# Patient Record
Sex: MALE | Race: WHITE | NOT HISPANIC OR LATINO | Employment: FULL TIME | URBAN - NONMETROPOLITAN AREA
[De-identification: names, ages, dates, MRNs, and addresses within clinical notes are randomized per-mention and may not be internally consistent; named-entity substitution may affect disease eponyms.]

---

## 2017-10-17 ENCOUNTER — OFFICE VISIT (OUTPATIENT)
Dept: URGENT CARE | Facility: PHYSICIAN GROUP | Age: 42
End: 2017-10-17
Payer: COMMERCIAL

## 2017-10-17 VITALS
DIASTOLIC BLOOD PRESSURE: 88 MMHG | HEIGHT: 70 IN | SYSTOLIC BLOOD PRESSURE: 132 MMHG | RESPIRATION RATE: 14 BRPM | OXYGEN SATURATION: 95 % | WEIGHT: 247 LBS | TEMPERATURE: 97.7 F | BODY MASS INDEX: 35.36 KG/M2 | HEART RATE: 72 BPM

## 2017-10-17 DIAGNOSIS — G43.809 OTHER MIGRAINE WITHOUT STATUS MIGRAINOSUS, NOT INTRACTABLE: ICD-10-CM

## 2017-10-17 DIAGNOSIS — R19.7 NAUSEA VOMITING AND DIARRHEA: ICD-10-CM

## 2017-10-17 DIAGNOSIS — R11.2 NAUSEA VOMITING AND DIARRHEA: ICD-10-CM

## 2017-10-17 PROCEDURE — 99214 OFFICE O/P EST MOD 30 MIN: CPT | Performed by: PHYSICIAN ASSISTANT

## 2017-10-17 RX ORDER — BUTALBITAL, ACETAMINOPHEN AND CAFFEINE 300; 40; 50 MG/1; MG/1; MG/1
1 CAPSULE ORAL EVERY 12 HOURS PRN
Qty: 15 CAP | Refills: 0 | Status: SHIPPED | OUTPATIENT
Start: 2017-10-17 | End: 2017-11-17

## 2017-10-17 NOTE — LETTER
October 17, 2017         Patient: Hakan Murrieta   YOB: 1975   Date of Visit: 10/17/2017           To Whom it May Concern:    Hakan Murrieta was seen in my clinic on 10/17/2017. He may return to work on 10/18/17. Please excuse him for missing 10/16/17 due to illness.    If you have any questions or concerns, please don't hesitate to call.        Sincerely,           Rain Romeo P.A.-C.  Electronically Signed

## 2017-10-17 NOTE — PROGRESS NOTES
Chief Complaint   Patient presents with   • Nausea       HISTORY OF PRESENT ILLNESS: Patient is a 41 y.o. male who presents today for the following:    Patient comes in for evaluation of headache and nausea. He reports having diarrhea over the last 3-4 days seems to have subsided but he has since developed a migraine and nausea without vomiting. He has a history of migraines but has not had them in years. He used to be on Imitrex but has not needed it in years. This headache is in the same location and similar quality as his typical migraines. He denies any vomiting and blood in his stool. Over-the-counter medication has not been helping with his headache. He does have nausea medication at home but has not been taking it. He denies localized abdominal pain but complains of diffuse discomfort and cramping. He had to miss work yesterday and needs a note to go back tomorrow. Patient denies recent travel, antibiotics, bad food or drink, and does report recent sick contacts.    There are no active problems to display for this patient.      Allergies:Review of patient's allergies indicates no known allergies.    Current Outpatient Prescriptions Ordered in [facility]   Medication Sig Dispense Refill   • acetaminophen/caffeine/butalbital 300-40-50 mg (FIORICET) -40 MG Cap capsule Take 1 Cap by mouth every 12 hours as needed for Headache or Migraine. 15 Cap 0   • LISINOPRIL-HYDROCHLOROTHIAZIDE PO Take  by mouth.     • ondansetron (ZOFRAN ODT) 4 MG TABLET DISPERSIBLE Take 1 Tab by mouth every 8 hours as needed for Nausea/Vomiting. 15 Tab 0   • ibuprofen (MOTRIN) 200 MG Tab Take 200 mg by mouth every 6 hours as needed.     • aspirin effervescent (HALEIGH-SELTZER) 325 MG Effer Tab Take 1 Tab by mouth every 6 hours as needed.     • amoxicillin-clavulanate (AUGMENTIN) 875-125 MG Tab Take 1 Tab by mouth 2 times a day. 20 Tab 0   • omeprazole (PRILOSEC) 20 MG delayed-release capsule Take 1 Cap by mouth every day. 30 Cap 0   •  "ondansetron (ZOFRAN) 4 MG TABS Take 1 Tab by mouth every four hours as needed for Nausea/Vomiting. 20 Tab 1     No current Epic-ordered facility-administered medications on file.        No past medical history on file.    Social History   Substance Use Topics   • Smoking status: Former Smoker   • Smokeless tobacco: Former User   • Alcohol use No       No family status information on file.   History reviewed. No pertinent family history.    ROS:   Review of Systems   Constitutional: Negative for fever, chills, weight loss and malaise/fatigue.   HENT: Negative for ear pain, nosebleeds, congestion, sore throat and neck pain.    Eyes: Negative for blurred vision.   Respiratory: Negative for cough, sputum production, shortness of breath and wheezing.    Cardiovascular: Negative for chest pain, palpitations, orthopnea and leg swelling.   Gastrointestinal: Negative for heartburn.  Genitourinary: Negative for dysuria, urgency and frequency.       Exam:  Blood pressure 132/88, pulse 72, temperature 36.5 °C (97.7 °F), resp. rate 14, height 1.778 m (5' 10\"), weight 112 kg (247 lb), SpO2 95 %.  General: Well developed, well nourished. No distress.  HEENT: PERRL/EOMI. head is grossly normal.  Pulmonary: Clear to ausculation and percussion.  Normal effort. No rales, ronchi, or wheezing.   Cardiovascular: Regular rate and rhythm without murmur. No edema.   Abdomen: Soft, nondistended, nontender to palpation.  Neurologic: Grossly nonfocal.  Lymph: No cervical lymphadenopathy noted.  Skin: Warm, dry, good turgor. No rashes in visible areas.   Psych: Normal mood. Alert and oriented x3. Judgment and insight is normal.    Assessment/Plan:  Discussed likely viral etiology of the nausea and diarrhea.. Discussed appropriate over-the-counter symptomatic medication, and when to return to clinic. Declines nausea medication. Take all medications as instructed. Follow up for worsening or persistent symptoms.  1. Other migraine without status " migrainosus, not intractable  acetaminophen/caffeine/butalbital 300-40-50 mg (FIORICET) -40 MG Cap capsule   2. Nausea vomiting and diarrhea

## 2017-11-17 ENCOUNTER — OCCUPATIONAL MEDICINE (OUTPATIENT)
Dept: URGENT CARE | Facility: PHYSICIAN GROUP | Age: 42
End: 2017-11-17

## 2017-11-17 VITALS
WEIGHT: 247.2 LBS | HEART RATE: 72 BPM | HEIGHT: 70 IN | BODY MASS INDEX: 35.39 KG/M2 | SYSTOLIC BLOOD PRESSURE: 128 MMHG | RESPIRATION RATE: 12 BRPM | DIASTOLIC BLOOD PRESSURE: 76 MMHG | TEMPERATURE: 97.4 F | OXYGEN SATURATION: 98 %

## 2017-11-17 DIAGNOSIS — Z02.1 PHYSICAL EXAM, PRE-EMPLOYMENT: ICD-10-CM

## 2017-11-17 PROCEDURE — 80305 DRUG TEST PRSMV DIR OPT OBS: CPT | Performed by: PHYSICIAN ASSISTANT

## 2017-11-17 PROCEDURE — 8915 PR COMPREHENSIVE PHYSICAL: Performed by: PHYSICIAN ASSISTANT

## 2017-11-17 ASSESSMENT — ENCOUNTER SYMPTOMS
CONSTITUTIONAL NEGATIVE: 1
MUSCULOSKELETAL NEGATIVE: 1
NEUROLOGICAL NEGATIVE: 1
GASTROINTESTINAL NEGATIVE: 1
EYES NEGATIVE: 1
RESPIRATORY NEGATIVE: 1
PSYCHIATRIC NEGATIVE: 1
CARDIOVASCULAR NEGATIVE: 1

## 2017-11-17 ASSESSMENT — VISUAL ACUITY
OD_CC: 20/20
OS_CC: 20/25

## 2017-11-17 NOTE — PROGRESS NOTES
"Subjective:      Hakan Murrieta is a 42 y.o. male who presents with Employment Physical ([employer]) and Drug / Alcohol Assessment        Drug / Alcohol Assessment     Pre-employment physical exam.   Patient reports no medical concerns.   HTN well controlled.  Hx of wrist injury and ankle fx WC 16 years ago.  No residual issues or deficits.     Review of Systems   Constitutional: Negative.    HENT: Negative.    Eyes: Negative.    Respiratory: Negative.    Cardiovascular: Negative.    Gastrointestinal: Negative.    Genitourinary: Negative.    Musculoskeletal: Negative.    Skin: Negative.    Neurological: Negative.    Endo/Heme/Allergies: Negative.    Psychiatric/Behavioral: Negative.        PMH:  has no past medical history on file.  MEDS:   Current Outpatient Prescriptions:   •  LISINOPRIL-HYDROCHLOROTHIAZIDE PO, Take  by mouth., Disp: , Rfl:   ALLERGIES: No Known Allergies  SURGHX: History reviewed. No pertinent surgical history.  SOCHX:  reports that he has been smoking Cigars.  He has a 2.50 pack-year smoking history. He quit smokeless tobacco use about 22 months ago. He reports that he drinks about 1.8 oz of alcohol per week . He reports that he does not use drugs.  FH: Family history was reviewed, no pertinent findings to report     Objective:     /76   Pulse 72   Temp 36.3 °C (97.4 °F)   Resp 12   Ht 1.778 m (5' 10\")   Wt 112.1 kg (247 lb 3.2 oz)   SpO2 98%   BMI 35.47 kg/m²      Physical Exam      See scanned sheets         Assessment/Plan:     1. Physical exam, pre-employment         -benign exam and testing.   -cleared for employment without accomodation    Carol Garcia P.A.-C.      "

## 2018-02-20 ENCOUNTER — OFFICE VISIT (OUTPATIENT)
Dept: URGENT CARE | Facility: PHYSICIAN GROUP | Age: 43
End: 2018-02-20
Payer: COMMERCIAL

## 2018-02-20 VITALS
TEMPERATURE: 97.9 F | HEART RATE: 77 BPM | WEIGHT: 264 LBS | HEIGHT: 71 IN | DIASTOLIC BLOOD PRESSURE: 80 MMHG | BODY MASS INDEX: 36.96 KG/M2 | OXYGEN SATURATION: 100 % | SYSTOLIC BLOOD PRESSURE: 138 MMHG | RESPIRATION RATE: 16 BRPM

## 2018-02-20 DIAGNOSIS — R51.9 NONINTRACTABLE HEADACHE, UNSPECIFIED CHRONICITY PATTERN, UNSPECIFIED HEADACHE TYPE: ICD-10-CM

## 2018-02-20 DIAGNOSIS — M54.2 NECK PAIN: ICD-10-CM

## 2018-02-20 DIAGNOSIS — R05.9 COUGH: ICD-10-CM

## 2018-02-20 DIAGNOSIS — J02.0 ACUTE STREPTOCOCCAL PHARYNGITIS: ICD-10-CM

## 2018-02-20 LAB
INT CON NEG: NORMAL
INT CON POS: NORMAL
S PYO AG THROAT QL: POSITIVE

## 2018-02-20 PROCEDURE — 99214 OFFICE O/P EST MOD 30 MIN: CPT | Performed by: FAMILY MEDICINE

## 2018-02-20 PROCEDURE — 87880 STREP A ASSAY W/OPTIC: CPT | Performed by: FAMILY MEDICINE

## 2018-02-20 RX ORDER — AZITHROMYCIN 250 MG/1
TABLET, FILM COATED ORAL
Qty: 6 TAB | Refills: 0 | Status: SHIPPED | OUTPATIENT
Start: 2018-02-20 | End: 2019-01-16

## 2018-02-20 RX ORDER — PREDNISONE 20 MG/1
TABLET ORAL
Qty: 5 TAB | Refills: 0 | Status: SHIPPED | OUTPATIENT
Start: 2018-02-20 | End: 2019-01-16

## 2018-02-20 NOTE — LETTER
February 20, 2018         Patient: Hakan Murrieta   YOB: 1975   Date of Visit: 2/20/2018           To Whom it May Concern:    Hakan Murrieta was seen in my clinic on 2/20/2018.     Please excuse from work for 2/19 and 2/20/18 due to medical condition.    If you have any questions or concerns, please don't hesitate to call.        Sincerely,           Roger Coombs M.D.  Electronically Signed

## 2018-06-04 ENCOUNTER — OCCUPATIONAL MEDICINE (OUTPATIENT)
Dept: URGENT CARE | Facility: PHYSICIAN GROUP | Age: 43
End: 2018-06-04
Payer: COMMERCIAL

## 2018-06-04 VITALS
BODY MASS INDEX: 38.08 KG/M2 | TEMPERATURE: 97.5 F | OXYGEN SATURATION: 98 % | WEIGHT: 266 LBS | HEIGHT: 70 IN | DIASTOLIC BLOOD PRESSURE: 88 MMHG | RESPIRATION RATE: 20 BRPM | HEART RATE: 78 BPM | SYSTOLIC BLOOD PRESSURE: 130 MMHG

## 2018-06-04 DIAGNOSIS — R42 LIGHTHEADED: ICD-10-CM

## 2018-06-04 LAB
BREATH ALCOHOL COMMENT: NORMAL
POC BREATHALIZER: NEGATIVE PERCENT (ref 0–0.01)

## 2018-06-04 PROCEDURE — 8898 PR URINE 11 PANEL - SEND TO LAB: Performed by: PHYSICIAN ASSISTANT

## 2018-06-04 PROCEDURE — 99213 OFFICE O/P EST LOW 20 MIN: CPT | Performed by: PHYSICIAN ASSISTANT

## 2018-06-04 PROCEDURE — 82075 ASSAY OF BREATH ETHANOL: CPT | Performed by: PHYSICIAN ASSISTANT

## 2018-06-04 NOTE — PROGRESS NOTES
Chief Complaint   Patient presents with   • Dizziness     5/30/18 dizziness while at work,  pt states it was due to not eating.  today feeling fine       HISTORY OF PRESENT ILLNESS: Patient is a 42 y.o. male who presents today for the following:    DOI: 5/30/18, around midnight  Starts work around 1900 hrs  Hadn't eaten in about 5 hours or so  Felt light headed, fleeting dizziness  Took a break, ate some food, felt better  No symptoms since  Denies h/o the same issues  Only on HTN medication; no recent changes     There are no active problems to display for this patient.      Allergies:Patient has no known allergies.    Current Outpatient Prescriptions Ordered in [facility]   Medication Sig Dispense Refill   • LISINOPRIL-HYDROCHLOROTHIAZIDE PO Take  by mouth.     • azithromycin (ZITHROMAX) 250 MG Tab 2 TABS ON DAY 1, 1 TAB ON DAYS 2-5. 6 Tab 0   • predniSONE (DELTASONE) 20 MG Tab 1 TAB ONCE A DAY X 5 DAYS. TAKE WITH FOOD. 5 Tab 0     No current Epic-ordered facility-administered medications on file.        No past medical history on file.    Social History   Substance Use Topics   • Smoking status: Current Some Day Smoker     Packs/day: 0.25     Years: 10.00     Types: Cigars   • Smokeless tobacco: Former User     Quit date: 1/1/2016   • Alcohol use 1.8 oz/week     3 Cans of beer per week      Comment: weekly       No family status information on file.   No family history on file.    Review of Systems:    Constitutional ROS: No unexpected change in weight, No weakness, No fatigue  Eye ROS: No recent significant change in vision, No eye pain, redness, discharge  Ear ROS: No drainage, No tinnitus or vertigo, No recent change in hearing  Mouth/Throat ROS: No teeth or gum problems, No bleeding gums, No tongue complaints  Neck ROS: No swollen glands, No significant pain in neck  Pulmonary ROS: No chronic cough, sputum, or hemoptysis, No dyspnea on exertion, No wheezing  Cardiovascular ROS: No diaphoresis, No edema, No  "palpitations  Gastrointestinal ROS: No change in bowel habits, No significant change in appetite, No nausea, vomiting, diarrhea, or constipation  Musculoskeletal/Extremities ROS: No peripheral edema, No pain, redness or swelling on the joints  Hematologic/Lymphatic ROS: No chills, No night sweats, No weight loss  Skin/Integumentary ROS: No edema, No evidence of rash, No itching      Exam:  Blood pressure 130/88, pulse 78, temperature 36.4 °C (97.5 °F), resp. rate 20, height 1.778 m (5' 10\"), weight 120.7 kg (266 lb), SpO2 98 %.  General: Well developed, well nourished. No distress.  Eye: PERRL/EOMI; conjunctivae clear, lids normal.  ENMT: Lips without lesions, MMM. Oropharynx is clear. Bilateral TMs are within normal limits.  Neck: Trachea midline, no masses. No thyromegaly. No carotid bruits noted.  Pulmonary: Unlabored respiratory effort. Lungs clear to auscultation, no wheezes, no rhonchi.  Cardiovascular: Regular rate and rhythm without murmur. No edema.   Neurologic: Grossly nonfocal. No facial asymmetry noted.  Lymph: No cervical lymphadenopathy noted.  Skin: Warm, dry, good turgor. No rashes in visible areas.   Psych: Normal mood. Alert and oriented x3. Judgment and insight is normal.    Assessment/Plan:  Patient is currently asymptomatic. Symptoms resulted from too much time between eating. Discharge/MMI.  1. Lightheaded      Temporary episode. No symptoms since.       "

## 2018-06-04 NOTE — LETTER
"EMPLOYEE’S CLAIM FOR COMPENSATION/ REPORT OF INITIAL TREATMENT  FORM C-4    EMPLOYEE’S CLAIM - PROVIDE ALL INFORMATION REQUESTED   First Name  Hakan Mckeon Last Name  Lit Birthdate                    1975                Sex  male Claim Number   Home Address  [address] Age  42 y.o. 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 (if applicable)  DRIVING A FORKLIFT    How did this injury or occupational disease occur? (Be specific an answer in detail. Use additional sheet if necessary)  UNKNOWN   If you believe that you have an occupational disease, when did you first have knowledge of the disability and it relationship to your employment?  N/A Witnesses to the Accident  N/A      Nature of Injury or Occupational Disease  Workers' Compensation  Part(s) of Body Injured or Affected  Skull, N/A, N/A    I certify that the above is true and correct to the best of my knowledge and that I have provided this information in order to obtain the benefits of [place]  I hereby authorize any physician, chiropractor, surgeon, practitioner, or other person, any hospital, including [facility] or [facility] hospital, any medical service organization, any insurance company, or other institution or organization to release to each other, any medical or other information, including benefits paid or payable, pertinent to this injury or disease, except information relative to diagnosis, treatment and/or counseling for AIDS, psychological conditions, alcohol or controlled substances, for which I must give specific authorization.  A Photostat of this authorization shall be as valid as the original.     Date  6/4/2018   [facility]   Employee’s Signature   THIS REPORT MUST BE COMPLETED AND MAILED WITHIN 3 WORKING DAYS OF TREATMENT   Place  [facility]  Name of Facility  [facility]   Date  6/4/2018 Diagnosis  (R42) Lightheaded Is there evidence the injured employee was under the influence of alcohol and/or another controlled substance at the time of accident?   Hour  9:58 AM Description of Injury or Disease  The encounter diagnosis was Lightheaded. No   Treatment  Assessment/Plan:  Patient is currently asymptomatic. Symptoms resulted from too much time between eating. Discharge/MMI.  1. Lightheaded     Temporary episode. No symptoms since.  Have you advised the patient to remain off work five days or more? No   X-Ray Findings      If Yes   From Date  To Date      From information given by the employee, together with medical evidence, can you directly connect this injury or occupational disease as job incurred?  No  Comments:appears to be from not eating in a timely fashion If No Full Duty  Yes Modified Duty      Is additional medical care by a physician indicated?  No If Modified Duty, Specify any Limitations / Restrictions      Do you know of any previous injury or disease contributing to this condition or  "occupational disease?                            No   Date  6/4/2018 Print Doctor’s Name Rain Romeo P.A.-C. I certify the employer’s copy of  this form was mailed on:   Address  [address] Insurer’s Use Only     [place] Zip  [address]    Provider’s Tax ID Number  [id number] Telephone  Dept: [phone]            e-Signature: Dr. Tristian Whitten, Medical Director Degree  PAC        ORIGINAL-TREATING PHYSICIAN OR CHIROPRACTOR    PAGE 2-INSURER/TPA    PAGE 3-EMPLOYER    PAGE 4-EMPLOYEE             Form C-4 (rev10/07)              BRIEF DESCRIPTION OF RIGHTS AND BENEFITS  (Pursuant to [place] 616[place])    Notice of Injury or Occupational Disease (Incident Report Form C-1): If an injury or occupational disease (OD) arises out of and in the  course of employment, you must provide written notice to your employer as soon as practicable, but no later than 7 days after the accident or  OD. Your employer shall maintain a sufficient supply of the required forms.    Claim for Compensation (Form C-4): If medical treatment is sought, the form C-4 is available at the place of initial treatment. A completed  \"Claim for Compensation\" (Form C-4) must be filed within 90 days after an accident or OD. The treating physician or chiropractor must,  within 3 working days after treatment, complete and mail to the employer, the employer's insurer and third-party , the Claim for  Compensation.    Medical Treatment: If you require medical treatment for your on-the-job injury or OD, you may be required to select a physician or  chiropractor from a list provided by your workers’ compensation insurer, if it has contracted with an Organization for Managed Care (MCO) or  Preferred Provider Organization (PPO) or providers of health care. If your employer has not entered into a contract with an MCO or PPO, you  may select a physician or chiropractor from the Panel of Physicians and Chiropractors. Any " medical costs related to your industrial injury or  OD will be paid by your insurer.    Temporary Total Disability (TTD): If your doctor has certified that you are unable to work for a period of at least 5 consecutive days, or 5  cumulative days in a 20-day period, or places restrictions on you that your employer does not accommodate, you may be entitled to TTD  compensation.    Temporary Partial Disability (TPD): If the wage you receive upon reemployment is less than the compensation for TTD to which you are  entitled, the insurer may be required to pay you TPD compensation to make up the difference. TPD can only be paid for a maximum of 24  months.    Permanent Partial Disability (PPD): When your medical condition is stable and there is an indication of a PPD as a result of your injury or  OD, within 30 days, your insurer must arrange for an evaluation by a rating physician or chiropractor to determine the degree of your PPD. The  amount of your PPD award depends on the date of injury, the results of the PPD evaluation and your age and wage.    Permanent Total Disability (PTD): If you are medically certified by a treating physician or chiropractor as permanently and totally disabled  and have been granted a PTD status by your insurer, you are entitled to receive monthly benefits not to exceed 66 2/3% of your average  monthly wage. The amount of your PTD payments is subject to reduction if you previously received a PPD award.    Vocational Rehabilitation Services: You may be eligible for vocational rehabilitation services if you are unable to return to the job due to a  permanent physical impairment or permanent restrictions as a result of your injury or occupational disease.    Transportation and Per Rob Reimbursement: You may be eligible for travel expenses and per rob associated with medical treatment.    Reopening: You may be able to reopen your claim if your condition worsens after claim closure.    Appeal  Process: If you disagree with a written determination issued by the insurer or the insurer does not respond to your request, you may  appeal to the Department of Administration, , by following the instructions contained in your determination letter. You must  appeal the determination within 70 days from the date of the determination letter at [address], [address], [place], [place]  [address], or [address], [address], [place], [place] [address]. If you disagree with the  decision, you may appeal to the  Department of Administration, . You must file your appeal within 30 days from the date of the  decision  letter at [address], [address], [place], [place] [address], or [address], [place], [place] [address]. If you  disagree with a decision of an , you may file a petition for judicial review with the District Court. You must do so within 30  days of the Appeal Officer’s decision. You may be represented by an  at your own expense or you may contact the [facility] for possible  representation.    [place]  for Injured Workers (NAIW): If you disagree with a  decision, you may request that NAIW represent you  without charge at an  Hearing. For information regarding denial of benefits, you may contact the [facility] at: [address], [address], [place] [address], [phone], or [address], [address], [place] [address], [phone]    To File a Complaint with the Division: If you wish to file a complaint with the  of the Division of Industrial Relations (DIR),  please contact the Workers’ Compensation Section, [address], [address], [place], [place] [address], telephone (010) 114-0604, or  [address] [facility] [address], [place] [address], telephone [phone]) [phone].    For assistance with Workers’  Compensation Issues: you may contact the Office of the Governor Consumer Health Assistance, [facility], [place], [place] [address], Toll Free 1-541.458.9939, Web site: http://govcha.[facility].nv., E-mail  Razia@[facility].nv.                                                                                                                                                                                                                                   __________________________________________________________________                                                                   _____6/4/2018____                Employee Name / Signature                                                                                                                                                       Date                                                                                                                                                                                                     D-2 (rev. 10/07)

## 2018-06-04 NOTE — LETTER
"[facility] [place] [place] [address]  Phone:  [phone] - Fax:  [phone]   [facility] Progress Report and Disability Certification  Date of Service: 6/4/2018   No Show:  No  Date / Time of Next Visit:     Claim Information   Patient Name: Hakan Murrieta  Claim Number:     Employer: [employer]  Date of Injury: 5/30/2018     Insurer / TPA: [employer]  ID / SSN:     Occupation: MAIN [employer]  Diagnosis: The encounter diagnosis was Lightheaded.    Medical Information   Related to Industrial Injury? No  Comments:appears to be from not eating in a timely fashion    Subjective Complaints:  DOI: 5/30/18, around midnight  Starts work around 1900 hrs  Hadn't eaten in about 5 hours or so  Felt light headed, fleeting dizziness  Took a break, ate some food, felt better  No symptoms since  Denies h/o the same issues  Only on HTN medication; no recent changes    Objective Findings: Blood pressure 130/88, pulse 78, temperature 36.4 °C (97.5 °F), resp. rate 20, height 1.778 m (5' 10\"), weight 120.7 kg (266 lb), SpO2 98 %.  General: Well developed, well nourished. No distress.  Eye: PERRL/EOMI; conjunctivae clear, lids normal.  ENMT: Lips without lesions, MMM. Oropharynx is clear. Bilateral TMs are within normal limits.  Neck: Trachea midline, no masses. No thyromegaly. No carotid bruits noted.  Pulmonary: Unlabored respiratory effort. Lungs clear to auscultation, no wheezes, no rhonchi.  Cardiovascular: Regular rate and rhythm without murmur. No edema.   Neurologic: Grossly nonfocal. No facial asymmetry noted.  Lymph: No cervical lymphadenopathy noted.  Skin: Warm, dry, good turgor. No rashes in visible areas.   Psych: Normal mood. Alert and oriented x3. Judgment and insight is normal.   Pre-Existing Condition(s):     Assessment:   Initial Visit    Status: Discharged /  MMI  Permanent Disability:No    Plan:      Diagnostics:      Comments:  " Assessment/Plan:  Patient is currently asymptomatic. Symptoms resulted from too much time between eating. Discharge/MMI.  1. Lightheaded     Temporary episode. No symptoms since.    Disability Information   Status: Released to Full Duty    From:  6/4/2018  Through:   Restrictions are:     Physical Restrictions   Sitting:    Standing:    Stooping:    Bending:      Squatting:    Walking:    Climbing:    Pushing:      Pulling:    Other:    Reaching Above Shoulder (L):   Reaching Above Shoulder (R):       Reaching Below Shoulder (L):    Reaching Below Shoulder (R):      Not to exceed Weight Limits   Carrying(hrs):   Weight Limit(lb):   Lifting(hrs):   Weight  Limit(lb):     Comments:      Repetitive Actions   Hands: i.e. Fine Manipulations from Grasping:     Feet: i.e. Operating Foot Controls:     Driving / Operate Machinery:     Physician Name: Rain Romeo P.A.-C. Physician Signature:   e-Signature: Dr. Tristian Whitten, Medical Director   Clinic Name / Location: [address] [address] Clinic Phone Number: Dept: [phone]   Appointment Time: 9:25 Am Visit Start Time: 9:58 AM   Check-In Time:  9:37 Am Visit Discharge Time:  10:42am   Original-Treating Physician or Chiropractor    Page 2-Insurer/TPA    Page 3-Employer    Page 4-Employee

## 2019-01-16 ENCOUNTER — OFFICE VISIT (OUTPATIENT)
Dept: URGENT CARE | Facility: PHYSICIAN GROUP | Age: 44
End: 2019-01-16
Payer: COMMERCIAL

## 2019-01-16 VITALS
DIASTOLIC BLOOD PRESSURE: 82 MMHG | RESPIRATION RATE: 16 BRPM | BODY MASS INDEX: 36.79 KG/M2 | WEIGHT: 257 LBS | HEIGHT: 70 IN | SYSTOLIC BLOOD PRESSURE: 122 MMHG | TEMPERATURE: 97.7 F | OXYGEN SATURATION: 100 % | HEART RATE: 80 BPM

## 2019-01-16 DIAGNOSIS — J06.9 URI WITH COUGH AND CONGESTION: ICD-10-CM

## 2019-01-16 PROCEDURE — 99214 OFFICE O/P EST MOD 30 MIN: CPT | Performed by: PHYSICIAN ASSISTANT

## 2019-01-16 RX ORDER — CEFDINIR 300 MG/1
300 CAPSULE ORAL 2 TIMES DAILY
Qty: 20 CAP | Refills: 0 | Status: SHIPPED | OUTPATIENT
Start: 2019-01-16 | End: 2019-01-26

## 2019-01-16 NOTE — LETTER
January 16, 2019         Patient: Hakan Murrieta   YOB: 1975   Date of Visit: 1/16/2019            To Whom it May Concern:    Hakan Murrieta was seen in my clinic on 1/16/2019. He may return to work on 1/17/19.    If you have any questions or concerns, please don't hesitate to call.        Sincerely,           Rain Romeo P.A.-C.  Electronically Signed

## 2019-01-16 NOTE — PROGRESS NOTES
Chief Complaint   Patient presents with   • Sinusitis       HISTORY OF PRESENT ILLNESS: Patient is a 43 y.o. male who presents today for the following:    Nasal congestion x 3 days  + HA, cough, ST  OTC meds tried: cough/cold meds  Coughing isn't keeping him awake  Denies fever  Mild body aches, chills     There are no active problems to display for this patient.      Allergies:Patient has no known allergies.    Current Outpatient Prescriptions Ordered in [facility]   Medication Sig Dispense Refill   • cefdinir (OMNICEF) 300 MG Cap Take 1 Cap by mouth 2 times a day for 10 days. Fill if symptoms worsen at any point OR if they fail to improve over the next 7-10 days 20 Cap 0   • LISINOPRIL-HYDROCHLOROTHIAZIDE PO Take  by mouth.       No current Epic-ordered facility-administered medications on file.        No past medical history on file.    Social History   Substance Use Topics   • Smoking status: Current Some Day Smoker     Packs/day: 0.25     Years: 10.00     Types: Cigars   • Smokeless tobacco: Former User     Quit date: 1/1/2016   • Alcohol use 1.8 oz/week     3 Cans of beer per week      Comment: weekly       No family status information on file.   No family history on file.    Review of Systems:   Constitutional ROS: No unexpected change in weight, No weakness, No fatigue  Eye ROS: No recent significant change in vision, No eye pain, redness, discharge  Ear ROS: No drainage, No tinnitus or vertigo, No recent change in hearing  Mouth/Throat ROS: No teeth or gum problems, No bleeding gums, No tongue complaints  Neck ROS: No swollen glands, No significant pain in neck  Pulmonary ROS: No chronic cough, sputum, or hemoptysis, No dyspnea on exertion, No wheezing  Cardiovascular ROS: No diaphoresis, No edema, No palpitations  Gastrointestinal ROS: No change in bowel habits, No significant change in appetite, No nausea, vomiting, diarrhea, or constipation  Musculoskeletal/Extremities ROS: No peripheral edema, No pain,  "redness or swelling on the joints  Hematologic/Lymphatic ROS: No chills, No night sweats, No weight loss  Skin/Integumentary ROS: No edema, No evidence of rash, No itching      Exam:  Blood pressure 122/82, pulse 80, temperature 36.5 °C (97.7 °F), temperature source Temporal, resp. rate 16, height 1.778 m (5' 10\"), weight 116.6 kg (257 lb), SpO2 100 %.  General: Well developed, well nourished. No distress.  Eye: PERRL/EOMI; conjunctivae clear, lids normal.  ENMT: Lips without lesions, MMM. Oropharynx is clear. Bilateral TMs are within normal limits.  Pulmonary: Unlabored respiratory effort. Lungs clear to auscultation, no wheezes, no rhonchi.  Cardiovascular: Regular rate and rhythm without murmur. No edema.   Abdomen: Soft, non-tender, nondistended. No hepatosplenomegaly.   Neurologic: Grossly nonfocal. No facial asymmetry noted.  Lymph: No cervical lymphadenopathy noted.  Skin: Warm, dry, good turgor. No rashes in visible areas.   Psych: Normal mood. Alert and oriented x3. Judgment and insight is normal.    Assessment/Plan:  Discussed likely viral etiology. Discussed appropriate over-the-counter symptomatic medication, and when to return to clinic. Follow up for worsening or persistent symptoms. Contingent antibiotic prescription given to patient to fill upon meeting criteria of guidelines discussed.   1. URI with cough and congestion  cefdinir (OMNICEF) 300 MG Cap       "

## 2019-02-05 ENCOUNTER — OFFICE VISIT (OUTPATIENT)
Dept: URGENT CARE | Facility: PHYSICIAN GROUP | Age: 44
End: 2019-02-05
Payer: COMMERCIAL

## 2019-02-05 VITALS
HEART RATE: 88 BPM | OXYGEN SATURATION: 100 % | HEIGHT: 70 IN | RESPIRATION RATE: 16 BRPM | BODY MASS INDEX: 36.65 KG/M2 | TEMPERATURE: 97.9 F | WEIGHT: 256 LBS

## 2019-02-05 DIAGNOSIS — R68.89 FLU-LIKE SYMPTOMS: ICD-10-CM

## 2019-02-05 DIAGNOSIS — J02.9 ACUTE PHARYNGITIS, UNSPECIFIED ETIOLOGY: ICD-10-CM

## 2019-02-05 DIAGNOSIS — Z20.818 EXPOSURE TO STREP THROAT: ICD-10-CM

## 2019-02-05 LAB
FLUAV+FLUBV AG SPEC QL IA: NEGATIVE
INT CON NEG: NORMAL
INT CON NEG: NORMAL
INT CON POS: NORMAL
INT CON POS: NORMAL
S PYO AG THROAT QL: NEGATIVE

## 2019-02-05 PROCEDURE — 99214 OFFICE O/P EST MOD 30 MIN: CPT | Performed by: FAMILY MEDICINE

## 2019-02-05 PROCEDURE — 87880 STREP A ASSAY W/OPTIC: CPT | Performed by: FAMILY MEDICINE

## 2019-02-05 PROCEDURE — 87804 INFLUENZA ASSAY W/OPTIC: CPT | Performed by: FAMILY MEDICINE

## 2019-02-05 RX ORDER — AZITHROMYCIN 250 MG/1
TABLET, FILM COATED ORAL
Qty: 6 TAB | Refills: 0 | Status: SHIPPED | OUTPATIENT
Start: 2019-02-05 | End: 2021-01-30

## 2019-02-05 NOTE — PROGRESS NOTES
Chief Complaint:    Chief Complaint   Patient presents with   • Headache   • Generalized Body Aches   • Sore Throat   • Emesis       History of Present Illness:    This is a new problem. He has been sick since 2/3/19 with fatigue, body aches, headache, fluctuating sore throat, and some cough. He vomited yesterday x 1, but no other vomiting. Some tolerable nausea. No diarrhea. For weeks, he has had some fluctuating sinus problems. His son was diagnosed with Strep throat on 2/1/19 and rx'd Amoxil and is improving. Other family members have been having sinus issues recently. Took OTC meds for symptoms with some temporary help. IP Ghoster works/tolerates.      Review of Systems:    Constitutional: See HPI. Negative for fever, chills, and diaphoresis.   Eyes: Negative for change in vision, photophobia, pain, redness, and discharge.  ENT: See HPI.   Respiratory: See HPI.  Cardiovascular: Negative for chest pain, palpitations, orthopnea, claudication, leg swelling, and PND.   Gastrointestinal: See HPI.   Genitourinary: Negative for dysuria, urinary urgency, urinary frequency, hematuria, and flank pain.   Musculoskeletal: See HPI.   Skin: Negative for rash and itching.   Neurological: See HPI.    Endo: Negative for polydipsia.   Heme: Does not bruise/bleed easily.   Psychiatric/Behavioral: Negative for depression, suicidal ideas, hallucinations, memory loss and substance abuse. The patient is not nervous/anxious and does not have insomnia.        Past Medical History:    No past medical history on file.    Past Surgical History:    No past surgical history on file.    Social History:    Social History     Social History   • Marital status: Single     Spouse name: N/A   • Number of children: N/A   • Years of education: N/A     Occupational History   • Not on file.     Social History Main Topics   • Smoking status: Current Some Day Smoker     Packs/day: 0.25     Years: 10.00     Types: Cigars   • Smokeless tobacco: Former User      "Quit date: 1/1/2016   • Alcohol use 1.8 oz/week     3 Cans of beer per week      Comment: weekly   • Drug use: No   • Sexual activity: Not on file     Other Topics Concern   • Not on file     Social History Narrative   • No narrative on file     Family History:    No family history on file.    Medications:    Current Outpatient Prescriptions on File Prior to Visit   Medication Sig Dispense Refill   • LISINOPRIL-HYDROCHLOROTHIAZIDE PO Take  by mouth.       No current facility-administered medications on file prior to visit.      Allergies:    No Known Allergies      Vitals:    Vitals:    02/05/19 1327   Pulse: 88   Resp: 16   Temp: 36.6 °C (97.9 °F)   TempSrc: Temporal   SpO2: 100%   Weight: 116.1 kg (256 lb)   Height: 1.778 m (5' 10\")       Physical Exam:    Constitutional: Vital signs reviewed. Appears well-developed and well-nourished. Fatigued. No acute distress.   Eyes: Sclera white, conjunctivae clear. PERRLA.  ENT: Mildly tender to palpation right ethmoidal sinus region. External ears normal. External auditory canals normal without discharge. TMs translucent and non-bulging. Hearing normal. Nasal mucosa erythematous. Lips/teeth are normal. Oral mucosa pink and moist. Posterior pharynx: mildly erythematous without exudate.  Neck: Neck supple.   Cardiovascular: Regular rate and rhythm. No murmur.  Pulmonary/Chest: Respirations non-labored. Clear to auscultation bilaterally.  Abdomen: Bowel sounds are normal active. Soft, non-distended, and non-tender to palpation.  Lymph: Cervical nodes without tenderness or enlargement.  Musculoskeletal: Normal gait. Normal range of motion. No muscular atrophy or weakness.  Neurological: Alert and oriented to person, place, and time. CN 2-12 intact. Muscle tone normal. Coordination normal.   Skin: No rashes or lesions. Warm, dry, normal turgor.  Psychiatric: Normal mood and affect. Behavior is normal. Judgment and thought content normal.     Diagnostics:    POCT RAPID STREP A " (Order #[id number]) on 2/5/19   Component Results     Component   Rapid Strep Screen   Negative    Internal Control Positive   Valid    Internal Control Negative   Valid    Last Resulted Time   Tue Feb 5, 2019  1:46 PM     POCT INFLUENZA A/B (Order #[id number]) on 2/5/19   Component Results     Component   Rapid Influenza A-B   Negative    Internal Control Positive   Valid    Internal Control Negative   Valid    Last Resulted Time   Tue Feb 5, 2019  2:13 PM       Assessment / Plan:    1. Acute pharyngitis, unspecified etiology  - POCT Rapid Strep A  - azithromycin (ZITHROMAX) 250 MG Tab; 2 TABS ON DAY 1, 1 TAB ON DAYS 2-5.  Dispense: 6 Tab; Refill: 0    2. Flu-like symptoms  - POCT Influenza A/B    3. Exposure to strep throat  - azithromycin (ZITHROMAX) 250 MG Tab; 2 TABS ON DAY 1, 1 TAB ON DAYS 2-5.  Dispense: 6 Tab; Refill: 0      Work note given - excuse for 2/5/19.    Discussed with him limitations of Rapid Strep test (possible false negative, only testing for Strep A), DDX, management options, and risks, benefits, and alternatives to treatment plan agreed upon.    May continue with OTC meds for symptoms prn.    Declines Rx for Zofran, says he still has some previously rx'd Zofran he can use prn.    He prefers antibiotic treatment, understands may be viral condition for which antibiotic won't work, but viral condition will eventually self-resolve.    Agreeable to medication prescribed.    Discussed expected course of duration, time for improvement, and to seek follow-up in Emergency Room, urgent care, or with PCP if getting worse at any time or not improving within expected time frame.

## 2019-02-05 NOTE — LETTER
February 5, 2019         Patient: Hakan Murrieta   YOB: 1975   Date of Visit: 2/5/2019           To Whom it May Concern:    Hakan Murrieta was seen in my clinic on 2/5/2019.     Please excuse from work for 2/5/19 due to medical condition.    If you have any questions or concerns, please don't hesitate to call.        Sincerely,           Roger Coombs M.D.  Electronically Signed

## 2019-10-29 ENCOUNTER — OFFICE VISIT (OUTPATIENT)
Dept: URGENT CARE | Facility: PHYSICIAN GROUP | Age: 44
End: 2019-10-29
Payer: COMMERCIAL

## 2019-10-29 VITALS
TEMPERATURE: 97.8 F | RESPIRATION RATE: 18 BRPM | DIASTOLIC BLOOD PRESSURE: 96 MMHG | BODY MASS INDEX: 38.17 KG/M2 | HEART RATE: 78 BPM | WEIGHT: 266 LBS | OXYGEN SATURATION: 97 % | SYSTOLIC BLOOD PRESSURE: 124 MMHG

## 2019-10-29 DIAGNOSIS — R11.2 NAUSEA VOMITING AND DIARRHEA: ICD-10-CM

## 2019-10-29 DIAGNOSIS — R19.7 NAUSEA VOMITING AND DIARRHEA: ICD-10-CM

## 2019-10-29 PROCEDURE — 99214 OFFICE O/P EST MOD 30 MIN: CPT | Performed by: PHYSICIAN ASSISTANT

## 2019-10-29 RX ORDER — ONDANSETRON 4 MG/1
4 TABLET, ORALLY DISINTEGRATING ORAL EVERY 8 HOURS PRN
Qty: 20 TAB | Refills: 0 | Status: SHIPPED | OUTPATIENT
Start: 2019-10-29 | End: 2021-01-30

## 2019-10-29 NOTE — LETTER
October 29, 2019         Patient: Hakan Murrieta   YOB: 1975   Date of Visit: 10/29/2019           To Whom it May Concern:    Hakan Murrieta was seen in my clinic on 10/29/2019. He may return to work on 10/30/19. Please excuse him for missing work 10/28 and 10/29 due to illness.    If you have any questions or concerns, please don't hesitate to call.        Sincerely,           Rain Romeo P.A.-C.  Electronically Signed

## 2019-10-29 NOTE — PROGRESS NOTES
Chief Complaint   Patient presents with   • Abdominal Pain   • Generalized Body Aches     Dry heaves   • Diarrhea       HISTORY OF PRESENT ILLNESS: Patient is a 43 y.o. male who presents today for the following:    Patient comes in for evaluation of nausea and diarrhea that started yesterday.  He reports having dry heaves only, not being able to vomit.  He has had 4-5 loose stools daily without blood.  He reports body aches and chills.  He complains of epigastric pain.  His wife was sick with the same symptoms.  He denies any recent travel, bad food, and antibiotics.  He has not taken any over-the-counter medication.  He primarily needs a note for work.    There are no active problems to display for this patient.      Allergies:Patient has no known allergies.    Current Outpatient Medications Ordered in Epic   Medication Sig Dispense Refill   • ondansetron (ZOFRAN ODT) 4 MG TABLET DISPERSIBLE Take 1 Tab by mouth every 8 hours as needed for Nausea. 20 Tab 0   • azithromycin (ZITHROMAX) 250 MG Tab 2 TABS ON DAY 1, 1 TAB ON DAYS 2-5. 6 Tab 0   • LISINOPRIL-HYDROCHLOROTHIAZIDE PO Take  by mouth.       No current Epic-ordered facility-administered medications on file.        History reviewed. No pertinent past medical history.    Social History     Tobacco Use   • Smoking status: Current Some Day Smoker     Packs/day: 0.25     Years: 10.00     Pack years: 2.50     Types: Cigars   • Smokeless tobacco: Former User     Quit date: 1/1/2016   Substance Use Topics   • Alcohol use: Yes     Alcohol/week: 1.8 oz     Types: 3 Cans of beer per week     Comment: weekly   • Drug use: No       No family status information on file.   History reviewed. No pertinent family history.    Review of Systems:   Constitutional ROS: No unexpected change in weight, No weakness, No fatigue  Eye ROS: No recent significant change in vision, No eye pain, redness, discharge  Ear ROS: No drainage, No tinnitus or vertigo, No recent change in  hearing  Mouth/Throat ROS: No teeth or gum problems, No bleeding gums, No tongue complaints  Neck ROS: No swollen glands, No significant pain in neck  Pulmonary ROS: No chronic cough, sputum, or hemoptysis, No dyspnea on exertion, No wheezing  Cardiovascular ROS: No diaphoresis, No edema, No palpitations  Gastrointestinal ROS: Nausea, diarrhea, and epigastric pain.  Musculoskeletal/Extremities ROS: No peripheral edema, No pain, redness or swelling on the joints  Hematologic/Lymphatic ROS: No chills, No night sweats, No weight loss  Skin/Integumentary ROS: No edema, No evidence of rash, No itching      Exam:  /96   Pulse 78   Temp 36.6 °C (97.8 °F) (Temporal)   Resp 18   Wt 120.7 kg (266 lb)   SpO2 97%   General: Well developed, well nourished. No distress.  HEENT: Head is grossly normal.  Moist mucous membranes.  Pulmonary: Unlabored respiratory effort. Lungs clear to auscultation, no wheezes, no rhonchi.  Cardiovascular: Regular rate and rhythm without murmur.   Abdomen: Soft, nondistended.  Diffuse tenderness without guarding or rebound.  Bowel sounds within normal limits.  No hepatosplenomegaly.   Neurologic: Grossly nonfocal. No facial asymmetry noted.  Skin: Warm, dry, good turgor. No rashes in visible areas.   Psych: Normal mood. Alert and oriented x3. Judgment and insight is normal.    Declines Zofran in clinic    Assessment/Plan:  Discussed likely viral etiology.  [place] diet.  Drink plenty fluids.  Use all medication as directed. . Follow up for worsening or persistent symptoms.  1. Nausea vomiting and diarrhea  ondansetron (ZOFRAN ODT) 4 MG TABLET DISPERSIBLE

## 2020-07-17 ENCOUNTER — TELEMEDICINE (OUTPATIENT)
Dept: TELEHEALTH | Facility: TELEMEDICINE | Age: 45
End: 2020-07-17
Payer: COMMERCIAL

## 2020-07-17 VITALS — RESPIRATION RATE: 16 BRPM | WEIGHT: 160 LBS | BODY MASS INDEX: 22.9 KG/M2 | HEIGHT: 70 IN

## 2020-07-17 DIAGNOSIS — R06.02 SOB (SHORTNESS OF BREATH): ICD-10-CM

## 2020-07-17 DIAGNOSIS — R05.9 COUGH: ICD-10-CM

## 2020-07-17 DIAGNOSIS — Z20.822 SUSPECTED COVID-19 VIRUS INFECTION: ICD-10-CM

## 2020-07-17 PROCEDURE — 99203 OFFICE O/P NEW LOW 30 MIN: CPT | Mod: 95,CR,CS | Performed by: NURSE PRACTITIONER

## 2020-07-17 RX ORDER — BENZONATATE 100 MG/1
100 CAPSULE ORAL 3 TIMES DAILY PRN
Qty: 60 CAP | Refills: 0 | Status: SHIPPED | OUTPATIENT
Start: 2020-07-17 | End: 2021-01-30

## 2020-07-17 RX ORDER — DEXTROMETHORPHAN HYDROBROMIDE AND PROMETHAZINE HYDROCHLORIDE 15; 6.25 MG/5ML; MG/5ML
5 SYRUP ORAL EVERY 4 HOURS PRN
Qty: 100 ML | Refills: 0 | Status: SHIPPED | OUTPATIENT
Start: 2020-07-17 | End: 2021-01-30

## 2020-07-17 RX ORDER — ALBUTEROL SULFATE 90 UG/1
2 AEROSOL, METERED RESPIRATORY (INHALATION) EVERY 6 HOURS PRN
Qty: 8.5 G | Refills: 0 | Status: SHIPPED | OUTPATIENT
Start: 2020-07-17 | End: 2021-01-30

## 2020-07-17 NOTE — PROGRESS NOTES
"Telemedicine Visit: Established Patient     This encounter was conducted via Zoom  Verbal consent was obtained. Patient's identity was verified.    Subjective:   CC: Cough  Hakan Murrieta is a 44 y.o. male presenting for evaluation and management of: a severe cough for 5 days. Patient is unaware of any contact with positive exposure. He has been tested through [employer] and is awaiting results. Patient states that it is a dry cough with SOB when he gets into his coughing fits. Denies fever, chills, nausea, vomiting, CP. No hx COPD or asthma. Patient states that he is not taking anything OTC for symptoms. Has taken his O2 saturation and he bounces between 90-93% on RA.     ROS   Denies any recent fevers or chills. No nausea or vomiting. No chest pains or shortness of breath.     No Known Allergies    Current medicines (including changes today)  Current Outpatient Medications   Medication Sig Dispense Refill   • ondansetron (ZOFRAN ODT) 4 MG TABLET DISPERSIBLE Take 1 Tab by mouth every 8 hours as needed for Nausea. 20 Tab 0   • azithromycin (ZITHROMAX) 250 MG Tab 2 TABS ON DAY 1, 1 TAB ON DAYS 2-5. 6 Tab 0   • LISINOPRIL-HYDROCHLOROTHIAZIDE PO Take  by mouth.       No current facility-administered medications for this visit.        There are no active problems to display for this patient.      No family history on file.    He  has no past medical history on file.  He  has no past surgical history on file.       Objective:   Resp 16   Ht 1.778 m (5' 10\")   Wt 72.6 kg (160 lb)   BMI 22.96 kg/m²       Physical Exam:  Constitutional: Alert, no distress, well-groomed.  Skin: No rashes in visible areas.  Eye: Round. Conjunctiva clear, lids normal. No icterus.   ENMT: Lips pink without lesions, good dentition, moist mucous membranes. Phonation normal.  Neck: No masses, no thyromegaly. Moves freely without pain.  CV: Pulse as reported by patient  Respiratory: Unlabored respiratory effort, no cough or audible wheeze " during visit  Psych: Alert and oriented x3, normal affect and mood.       Assessment and Plan:   The following treatment plan was discussed:     1. Suspected COVID-19 virus infection  - albuterol 108 (90 Base) MCG/ACT Aero Soln inhalation aerosol; Inhale 2 Puffs by mouth every 6 hours as needed for Shortness of Breath.  Dispense: 8.5 g; Refill: 0    2. Cough  - albuterol 108 (90 Base) MCG/ACT Aero Soln inhalation aerosol; Inhale 2 Puffs by mouth every 6 hours as needed for Shortness of Breath.  Dispense: 8.5 g; Refill: 0  - benzonatate (TESSALON) 100 MG Cap; Take 1 Cap by mouth 3 times a day as needed for Cough.  Dispense: 60 Cap; Refill: 0  - promethazine-dextromethorphan (PROMETHAZINE-DM) 6.25-15 MG/5ML syrup; Take 5 mL by mouth every four hours as needed for Cough.  Dispense: 100 mL; Refill: 0    3. SOB (shortness of breath)  - albuterol 108 (90 Base) MCG/ACT Aero Soln inhalation aerosol; Inhale 2 Puffs by mouth every 6 hours as needed for Shortness of Breath.  Dispense: 8.5 g; Refill: 0    Discussed PE findings and patient presenting and length of symptoms is more than likely viral in etiology and could be COVID. Patient is to remain in isolation until cleared by the health department in [place] as he has already been tested. Discussed that we are not writing notes but employers are to follow CDC recommendations and he is to remain at home until he has a negative result. Discussed ER precautions including SOB, difficulty breathing, O2 saturation consistently below 90% on RA and high fever not controlled by OTC medications. Will provide patient symptomatic relief with albuterol inhaler, and tessalon and promethazine DM for cough. Patient was advised by the health department in [place] to avoid NSAIDS to he was instructed to use tylenol as needed as well as ensure adequate hydration.     Supportive care, differential diagnoses, and indications for immediate follow-up discussed with patient.    Pathogenesis of  diagnosis discussed including typical length and natural progression. Patient expresses understanding and agrees to plan.       Please note that this dictation was created using voice recognition software. I have made every reasonable attempt to correct obvious errors, but I expect that there are errors of grammar and possibly content that I did not discover before finalizing the note.    Follow-up: No follow-ups on file.

## 2021-01-30 ENCOUNTER — OFFICE VISIT (OUTPATIENT)
Dept: URGENT CARE | Facility: PHYSICIAN GROUP | Age: 46
End: 2021-01-30
Payer: COMMERCIAL

## 2021-01-30 VITALS
BODY MASS INDEX: 37.19 KG/M2 | OXYGEN SATURATION: 94 % | TEMPERATURE: 96.7 F | DIASTOLIC BLOOD PRESSURE: 88 MMHG | RESPIRATION RATE: 16 BRPM | HEART RATE: 88 BPM | HEIGHT: 72 IN | WEIGHT: 274.6 LBS | SYSTOLIC BLOOD PRESSURE: 134 MMHG

## 2021-01-30 DIAGNOSIS — G43.909 MIGRAINE WITHOUT STATUS MIGRAINOSUS, NOT INTRACTABLE, UNSPECIFIED MIGRAINE TYPE: ICD-10-CM

## 2021-01-30 DIAGNOSIS — J01.90 ACUTE BACTERIAL SINUSITIS: ICD-10-CM

## 2021-01-30 DIAGNOSIS — B96.89 ACUTE BACTERIAL SINUSITIS: ICD-10-CM

## 2021-01-30 PROCEDURE — 99214 OFFICE O/P EST MOD 30 MIN: CPT | Performed by: FAMILY MEDICINE

## 2021-01-30 RX ORDER — AZITHROMYCIN 250 MG/1
TABLET, FILM COATED ORAL
Qty: 6 TAB | Refills: 0 | Status: SHIPPED | OUTPATIENT
Start: 2021-01-30 | End: 2021-02-04

## 2021-01-30 RX ORDER — SUMATRIPTAN 50 MG/1
TABLET, FILM COATED ORAL
Qty: 10 TAB | Refills: 1 | Status: SHIPPED | OUTPATIENT
Start: 2021-01-30 | End: 2021-09-01

## 2021-01-30 NOTE — PROGRESS NOTES
Chief Complaint:    Chief Complaint   Patient presents with   • Sinus Problem     pressure x2 days causing dizziness and migraine. Called into work past 2 days needs note to return to work.        History of Present Illness:    This is a new problem. Started not feeling well 1/27/21. Eventually developed a lot of sinus pressure and has post-nasal drainage causing sore throat in AM. Feels he has a sinus infection. Now he also has what he feels is migraine, affecting right side of head. Some light sensitivity of eyes. He has history of migraines and Imitrex helped in the past. Z-vineet has worked and been tolerated in the past when he has taken.      Review of Systems:    Constitutional: Negative for fever, chills, and diaphoresis.   Eyes: See HPI.  ENT: See HPI.  Respiratory: Negative for cough, hemoptysis, sputum production, shortness of breath, wheezing, and stridor.    Cardiovascular: Negative for chest pain, palpitations, orthopnea, claudication, leg swelling, and PND.   Gastrointestinal: Negative for abdominal pain, nausea, vomiting, diarrhea, constipation, blood in stool, and melena.   Genitourinary: Negative for dysuria, urinary urgency, urinary frequency, hematuria, and flank pain.   Musculoskeletal: Negative for myalgias, joint pain, neck pain, and back pain.   Skin: Negative for rash and itching.   Neurological: See HPI.   Endo: Negative for polydipsia.   Heme: Does not bruise/bleed easily.   Psychiatric/Behavioral: Negative for depression, suicidal ideas, hallucinations, memory loss and substance abuse. The patient is not nervous/anxious and does not have insomnia.        Past Medical History:    History reviewed. No pertinent past medical history.    Past Surgical History:    History reviewed. No pertinent surgical history.    Social History:    Social History     Socioeconomic History   • Marital status: Single     Spouse name: Not on file   • Number of children: Not on file   • Years of education: Not on  file   • Highest education level: Not on file   Occupational History   • Not on file   Social Needs   • Financial resource strain: Not on file   • Food insecurity     Worry: Not on file     Inability: Not on file   • Transportation needs     Medical: Not on file     Non-medical: Not on file   Tobacco Use   • Smoking status: Current Some Day Smoker     Packs/day: 0.25     Years: 10.00     Pack years: 2.50     Types: Cigars   • Smokeless tobacco: Former User     Quit date: 1/1/2016   Substance and Sexual Activity   • Alcohol use: Yes     Alcohol/week: 1.8 oz     Types: 3 Cans of beer per week     Comment: weekly   • Drug use: No   • Sexual activity: Not on file   Lifestyle   • Physical activity     Days per week: Not on file     Minutes per session: Not on file   • Stress: Not on file   Relationships   • Social connections     Talks on phone: Not on file     Gets together: Not on file     Attends Alevism service: Not on file     Active member of club or organization: Not on file     Attends meetings of clubs or organizations: Not on file     Relationship status: Not on file   • Intimate partner violence     Fear of current or ex partner: Not on file     Emotionally abused: Not on file     Physically abused: Not on file     Forced sexual activity: Not on file   Other Topics Concern   • Not on file   Social History Narrative   • Not on file     Family History:    History reviewed. No pertinent family history.    Medications:    Current Outpatient Medications on File Prior to Visit   Medication Sig Dispense Refill   • LISINOPRIL-HYDROCHLOROTHIAZIDE PO Take  by mouth.       No current facility-administered medications on file prior to visit.      Allergies:    No Known Allergies      Vitals:    Vitals:    01/30/21 0931   BP: 134/88   Pulse: 88   Resp: 16   Temp: 35.9 °C (96.7 °F)   TempSrc: Temporal   SpO2: 94%   Weight: 125 kg (274 lb 9.6 oz)   Height: 1.829 m (6')       Physical Exam:    Constitutional: Vital signs  reviewed. Appears well-developed and well-nourished. No acute distress. Looks to have a mild headache.  Eyes: Sclera white, conjunctivae clear. PERRLA.  ENT: External ears normal. External auditory canals normal without discharge. TMs translucent and non-bulging. Hearing normal. Nasal mucosa pink. Lips/teeth are normal. Oral mucosa pink and moist. Posterior pharynx: WNL.  Neck: Neck supple.   Cardiovascular: Regular rate and rhythm. No murmur.  Pulmonary/Chest: Respirations non-labored. Clear to auscultation bilaterally.  Lymph: Cervical nodes without tenderness or enlargement.  Musculoskeletal: Normal gait. Normal range of motion. No muscular atrophy or weakness.  Neurological: Alert and oriented to person, place, and time. CN 2-12 intact. Muscle tone normal. Coordination normal.   Skin: No rashes or lesions. Warm, dry, normal turgor.  Psychiatric: Normal mood and affect. Behavior is normal. Judgment and thought content normal.       Assessment / Plan:    1. Migraine without status migrainosus, not intractable, unspecified migraine type  - SUMAtriptan (IMITREX) 50 MG Tab; 1 TAB BY MOUTH ONLY IF NEEDED FOR HEADACHE. MAY REPEAT EVERY 2 HOURS IF NEEDED, MAXIMUM 4 PILLS IN 24 HOURS.  Dispense: 10 Tab; Refill: 1    2. Acute bacterial sinusitis  - azithromycin (ZITHROMAX) 250 MG Tab; 2 TABS BY MOUTH ON DAY 1, 1 TAB ON DAYS 2-5.  Dispense: 6 Tab; Refill: 0      Work note given - excuse for 1/28 thru and including 1/31/21. May return to regular full duty on 2/1/21.    Discussed with him DDX, management options, and risks, benefits, and alternatives to treatment plan agreed upon.    Declines Toradol IM for headache.    Agreeable to medications prescribed.    Discussed expected course of duration, time for improvement, and to seek follow-up in Emergency Room, urgent care, or with PCP if getting worse at any time or not improving within expected time frame.

## 2021-01-30 NOTE — LETTER
January 30, 2021         Patient: Hakan Murrieta   YOB: 1975   Date of Visit: 1/30/2021           To Whom it May Concern:    Hakan Murrieta was seen in my clinic on 1/30/2021.     Please excuse from work for 1/28 thru and including 1/31/21 due to medical condition.    May return to regular full duty on 2/1/21.    If you have any questions or concerns, please don't hesitate to call.        Sincerely,           Roger Coombs M.D.  Electronically Signed

## 2021-07-27 ENCOUNTER — APPOINTMENT (OUTPATIENT)
Dept: RADIOLOGY | Facility: IMAGING CENTER | Age: 46
End: 2021-07-27
Attending: CHIROPRACTOR
Payer: COMMERCIAL

## 2021-07-27 ENCOUNTER — APPOINTMENT (OUTPATIENT)
Dept: URGENT CARE | Facility: PHYSICIAN GROUP | Age: 46
End: 2021-07-27
Payer: COMMERCIAL

## 2021-07-27 DIAGNOSIS — M25.561 RIGHT KNEE PAIN, UNSPECIFIED CHRONICITY: ICD-10-CM

## 2021-07-27 DIAGNOSIS — M54.50 LOW BACK PAIN, UNSPECIFIED BACK PAIN LATERALITY, UNSPECIFIED CHRONICITY, UNSPECIFIED WHETHER SCIATICA PRESENT: ICD-10-CM

## 2021-07-27 PROCEDURE — 72100 X-RAY EXAM L-S SPINE 2/3 VWS: CPT | Mod: TC,FY | Performed by: PHYSICIAN ASSISTANT

## 2021-07-27 PROCEDURE — 72170 X-RAY EXAM OF PELVIS: CPT | Mod: TC,FY | Performed by: PHYSICIAN ASSISTANT

## 2021-07-27 PROCEDURE — 73564 X-RAY EXAM KNEE 4 OR MORE: CPT | Mod: TC,FY,RT | Performed by: PHYSICIAN ASSISTANT

## 2021-09-01 ENCOUNTER — OFFICE VISIT (OUTPATIENT)
Dept: URGENT CARE | Facility: PHYSICIAN GROUP | Age: 46
End: 2021-09-01
Payer: COMMERCIAL

## 2021-09-01 VITALS
DIASTOLIC BLOOD PRESSURE: 100 MMHG | BODY MASS INDEX: 38.64 KG/M2 | OXYGEN SATURATION: 97 % | HEART RATE: 97 BPM | HEIGHT: 71 IN | TEMPERATURE: 98.4 F | SYSTOLIC BLOOD PRESSURE: 150 MMHG | WEIGHT: 276 LBS | RESPIRATION RATE: 18 BRPM

## 2021-09-01 DIAGNOSIS — F43.20 ADJUSTMENT DISORDER, UNSPECIFIED TYPE: ICD-10-CM

## 2021-09-01 PROCEDURE — 99214 OFFICE O/P EST MOD 30 MIN: CPT | Performed by: PHYSICIAN ASSISTANT

## 2021-09-01 RX ORDER — MONTELUKAST SODIUM 10 MG/1
TABLET ORAL
COMMUNITY
Start: 2020-11-25 | End: 2021-09-01

## 2021-09-01 RX ORDER — CLONAZEPAM 1 MG/1
.5-1 TABLET ORAL 2 TIMES DAILY PRN
Qty: 20 TABLET | Refills: 0 | Status: SHIPPED | OUTPATIENT
Start: 2021-09-01 | End: 2021-09-11

## 2021-09-01 NOTE — PROGRESS NOTES
"Chief Complaint   Patient presents with   • Panic Attack     has never had them before , his Father just passed away 1 week ago       HISTORY OF PRESENT ILLNESS: Patient is a 45 y.o. male who presents today for the following:    Dad committed suicide last week  \"lives in a fog\"  Increasing anxiety over the last week: anxious, jittery, tight in chest  Last night was the first \"attack\"; tying his shoes, just couldn't get his breathing under control; crying  Not sleeping well; maybe 2-3 hours/night; wakes drenched in sweat  Denies SI/HI, self harm  Denies history of any anxiety/depression/sleep issues    There are no problems to display for this patient.      Allergies:Patient has no known allergies.    Current Outpatient Medications Ordered in Epic   Medication Sig Dispense Refill   • clonazePAM (KLONOPIN) 1 MG Tab Take 0.5-1 Tablets by mouth 2 times a day as needed (anxiety) for up to 10 days. 20 Tablet 0     No current Epic-ordered facility-administered medications on file.       History reviewed. No pertinent past medical history.    Social History     Tobacco Use   • Smoking status: Former Smoker     Packs/day: 0.25     Years: 10.00     Pack years: 2.50     Types: Cigars   • Smokeless tobacco: Current User     Last attempt to quit: 1/1/2016   Substance Use Topics   • Alcohol use: Yes     Alcohol/week: 1.8 oz     Types: 3 Cans of beer per week     Comment: weekly   • Drug use: No       No family status information on file.   History reviewed. No pertinent family history.    Review of Systems:   Pertinent systems reviewed with the patient.    Exam:  /100   Pulse 97   Temp 36.9 °C (98.4 °F) (Temporal)   Resp 18   Ht 1.803 m (5' 11\")   Wt (!) 125 kg (276 lb)   SpO2 97%   General: Well developed, well nourished. No distress.    HEENT: Head is grossly normal.  Pulmonary: Unlabored respiratory effort.  .   Neurologic: Grossly nonfocal. No facial asymmetry noted.  Skin: Warm, dry, good turgor. No rashes in " visible areas.   Psych: Normal mood. Alert and oriented to person, place and time.    Assessment/Plan:  Patient has no history of mental health issues.  Referring him for therapy.  Trying clonazepam for sleep.  Discussed appropriate over-the-counter symptomatic medication if the clonazepam does not work.  Patient has an appointment to establish 9/13 with a primary care provider.  Discussed red flags ER precautions.  1. Adjustment disorder, unspecified type  clonazePAM (KLONOPIN) 1 MG Tab    REFERRAL TO PSYCHIATRY

## 2021-09-01 NOTE — LETTER
September 1, 2021         Patient: Hakan Murrieta   YOB: 1975   Date of Visit: 9/1/2021           To Whom it May Concern:    Hakan Murrieta was seen in my clinic on 9/1/2021.  Please excuse him for missing work 8/31 and 9/1.    If you have any questions or concerns, please don't hesitate to call.        Sincerely,           Rain Romeo P.A.-C.  Electronically Signed

## 2021-09-02 ENCOUNTER — OFFICE VISIT (OUTPATIENT)
Dept: URGENT CARE | Facility: PHYSICIAN GROUP | Age: 46
End: 2021-09-02
Payer: COMMERCIAL

## 2021-09-02 ENCOUNTER — HOSPITAL ENCOUNTER (OUTPATIENT)
Facility: MEDICAL CENTER | Age: 46
End: 2021-09-02
Attending: NURSE PRACTITIONER
Payer: COMMERCIAL

## 2021-09-02 VITALS
SYSTOLIC BLOOD PRESSURE: 134 MMHG | OXYGEN SATURATION: 95 % | BODY MASS INDEX: 38.78 KG/M2 | RESPIRATION RATE: 20 BRPM | TEMPERATURE: 97.9 F | HEART RATE: 98 BPM | DIASTOLIC BLOOD PRESSURE: 88 MMHG | HEIGHT: 71 IN | WEIGHT: 277 LBS

## 2021-09-02 DIAGNOSIS — J06.9 URI, ACUTE: ICD-10-CM

## 2021-09-02 LAB — COVID ORDER STATUS COVID19: NORMAL

## 2021-09-02 PROCEDURE — 99213 OFFICE O/P EST LOW 20 MIN: CPT | Performed by: NURSE PRACTITIONER

## 2021-09-02 PROCEDURE — U0005 INFEC AGEN DETEC AMPLI PROBE: HCPCS

## 2021-09-02 PROCEDURE — U0003 INFECTIOUS AGENT DETECTION BY NUCLEIC ACID (DNA OR RNA); SEVERE ACUTE RESPIRATORY SYNDROME CORONAVIRUS 2 (SARS-COV-2) (CORONAVIRUS DISEASE [COVID-19]), AMPLIFIED PROBE TECHNIQUE, MAKING USE OF HIGH THROUGHPUT TECHNOLOGIES AS DESCRIBED BY CMS-2020-01-R: HCPCS

## 2021-09-02 ASSESSMENT — ENCOUNTER SYMPTOMS
CHILLS: 1
COUGH: 1
RHINORRHEA: 1
HEADACHES: 1

## 2021-09-02 NOTE — PROGRESS NOTES
Subjective     Hakan Murrieta is a 45 y.o. male who presents with Coronavirus Screening, Headache, and Body Aches    No past medical history on file.  Social History     Socioeconomic History   • Marital status: Single     Spouse name: Not on file   • Number of children: Not on file   • Years of education: Not on file   • Highest education level: Not on file   Occupational History   • Not on file   Tobacco Use   • Smoking status: Former Smoker     Packs/day: 0.25     Years: 10.00     Pack years: 2.50     Types: Cigars   • Smokeless tobacco: Current User     Last attempt to quit: 1/1/2016   Substance and Sexual Activity   • Alcohol use: Yes     Alcohol/week: 1.8 oz     Types: 3 Cans of beer per week     Comment: weekly   • Drug use: No   • Sexual activity: Not on file   Other Topics Concern   • Not on file   Social History Narrative   • Not on file     Social Determinants of Health     Financial Resource Strain:    • Difficulty of Paying Living Expenses:    Food Insecurity:    • Worried About Running Out of Food in the Last Year:    • Ran Out of Food in the Last Year:    Transportation Needs:    • Lack of Transportation (Medical):    • Lack of Transportation (Non-Medical):    Physical Activity:    • Days of Exercise per Week:    • Minutes of Exercise per Session:    Stress:    • Feeling of Stress :    Social Connections:    • Frequency of Communication with Friends and Family:    • Frequency of Social Gatherings with Friends and Family:    • Attends Taoism Services:    • Active Member of Clubs or Organizations:    • Attends Club or Organization Meetings:    • Marital Status:    Intimate Partner Violence:    • Fear of Current or Ex-Partner:    • Emotionally Abused:    • Physically Abused:    • Sexually Abused:      No family history on file.    Allergies: Patient has no known allergies.            URI   This is a new problem. The current episode started in the past 7 days. The problem has been unchanged. There  "has been no fever. Associated symptoms include congestion, coughing, headaches and rhinorrhea. He has tried nothing for the symptoms. The treatment provided no relief.       Review of Systems   Constitutional: Positive for chills and malaise/fatigue.   HENT: Positive for congestion and rhinorrhea.    Respiratory: Positive for cough.    Neurological: Positive for headaches.   All other systems reviewed and are negative.             Objective     /88   Pulse 98   Temp 36.6 °C (97.9 °F) (Temporal)   Resp 20   Ht 1.803 m (5' 11\")   Wt (!) 126 kg (277 lb)   SpO2 95%   BMI 38.63 kg/m²      Physical Exam  Vitals reviewed.   Constitutional:       Appearance: Normal appearance.   HENT:      Head: Normocephalic.      Right Ear: Tympanic membrane, ear canal and external ear normal.      Left Ear: Tympanic membrane, ear canal and external ear normal.      Nose: Congestion present.      Mouth/Throat:      Mouth: Mucous membranes are moist.   Eyes:      Extraocular Movements: Extraocular movements intact.      Conjunctiva/sclera: Conjunctivae normal.      Pupils: Pupils are equal, round, and reactive to light.   Cardiovascular:      Rate and Rhythm: Normal rate and regular rhythm.      Heart sounds: Normal heart sounds.   Pulmonary:      Effort: Pulmonary effort is normal. No respiratory distress.      Breath sounds: Normal breath sounds. No stridor. No wheezing, rhonchi or rales.   Chest:      Chest wall: No tenderness.   Musculoskeletal:         General: Normal range of motion.      Cervical back: Normal range of motion and neck supple.   Skin:     General: Skin is warm.   Neurological:      Mental Status: He is alert and oriented to person, place, and time.   Psychiatric:         Mood and Affect: Mood normal.         Behavior: Behavior normal.                             Assessment & Plan   URI  Cough    Covid nasal swab obtained  Self quarantine until results received  Patient advised she will receive results via " MyChart  Tylenol/Motrin as needed, over-the-counter cough/cold medication of choice as needed  Strict ER precautions for respiratory distress  Written instructions given for self-care quarantine at home  Return to urgent care otherwise for any further questions or concerns          There are no diagnoses linked to this encounter.

## 2021-09-03 ENCOUNTER — TELEPHONE (OUTPATIENT)
Dept: URGENT CARE | Facility: PHYSICIAN GROUP | Age: 46
End: 2021-09-03

## 2021-09-03 LAB
SARS-COV-2 RNA RESP QL NAA+PROBE: NOTDETECTED
SPECIMEN SOURCE: NORMAL

## 2021-09-13 ENCOUNTER — OFFICE VISIT (OUTPATIENT)
Dept: MEDICAL GROUP | Facility: PHYSICIAN GROUP | Age: 46
End: 2021-09-13
Payer: COMMERCIAL

## 2021-09-13 VITALS
BODY MASS INDEX: 39.06 KG/M2 | HEIGHT: 71 IN | TEMPERATURE: 98.9 F | RESPIRATION RATE: 12 BRPM | SYSTOLIC BLOOD PRESSURE: 146 MMHG | WEIGHT: 279 LBS | HEART RATE: 78 BPM | OXYGEN SATURATION: 98 % | DIASTOLIC BLOOD PRESSURE: 94 MMHG

## 2021-09-13 DIAGNOSIS — R73.03 PREDIABETES: ICD-10-CM

## 2021-09-13 DIAGNOSIS — R05.9 COUGH: ICD-10-CM

## 2021-09-13 DIAGNOSIS — R20.0 HAND NUMBNESS: ICD-10-CM

## 2021-09-13 DIAGNOSIS — Z72.0 TOBACCO USER: ICD-10-CM

## 2021-09-13 DIAGNOSIS — F41.1 GAD (GENERALIZED ANXIETY DISORDER): ICD-10-CM

## 2021-09-13 DIAGNOSIS — R06.83 SNORING: ICD-10-CM

## 2021-09-13 DIAGNOSIS — Z76.89 ENCOUNTER TO ESTABLISH CARE: ICD-10-CM

## 2021-09-13 DIAGNOSIS — R63.5 WEIGHT GAIN: ICD-10-CM

## 2021-09-13 DIAGNOSIS — F32.1 CURRENT MODERATE EPISODE OF MAJOR DEPRESSIVE DISORDER WITHOUT PRIOR EPISODE (HCC): ICD-10-CM

## 2021-09-13 PROBLEM — J45.909 REACTIVE AIRWAY DISEASE: Status: ACTIVE | Noted: 2021-09-13

## 2021-09-13 PROBLEM — F32.A DEPRESSION: Status: ACTIVE | Noted: 2021-09-13

## 2021-09-13 PROBLEM — J30.1 ALLERGIC RHINITIS DUE TO POLLEN: Status: ACTIVE | Noted: 2021-09-13

## 2021-09-13 PROBLEM — E66.9 OBESITY: Status: ACTIVE | Noted: 2021-09-13

## 2021-09-13 PROBLEM — J45.20 MILD INTERMITTENT ASTHMA: Status: ACTIVE | Noted: 2021-09-13

## 2021-09-13 PROBLEM — R03.0 ELEVATED BLOOD PRESSURE READING WITHOUT DIAGNOSIS OF HYPERTENSION: Status: ACTIVE | Noted: 2021-09-13

## 2021-09-13 PROBLEM — E78.1 HYPERTRIGLYCERIDEMIA: Status: ACTIVE | Noted: 2021-09-13

## 2021-09-13 PROCEDURE — 99214 OFFICE O/P EST MOD 30 MIN: CPT | Performed by: NURSE PRACTITIONER

## 2021-09-13 ASSESSMENT — ANXIETY QUESTIONNAIRES
4. TROUBLE RELAXING: SEVERAL DAYS
7. FEELING AFRAID AS IF SOMETHING AWFUL MIGHT HAPPEN: NOT AT ALL
5. BEING SO RESTLESS THAT IT IS HARD TO SIT STILL: NOT AT ALL
2. NOT BEING ABLE TO STOP OR CONTROL WORRYING: MORE THAN HALF THE DAYS
IF YOU CHECKED OFF ANY PROBLEMS ON THIS QUESTIONNAIRE, HOW DIFFICULT HAVE THESE PROBLEMS MADE IT FOR YOU TO DO YOUR WORK, TAKE CARE OF THINGS AT HOME, OR GET ALONG WITH OTHER PEOPLE: NOT DIFFICULT AT ALL
GAD7 TOTAL SCORE: 7
3. WORRYING TOO MUCH ABOUT DIFFERENT THINGS: MORE THAN HALF THE DAYS
6. BECOMING EASILY ANNOYED OR IRRITABLE: SEVERAL DAYS
1. FEELING NERVOUS, ANXIOUS, OR ON EDGE: SEVERAL DAYS

## 2021-09-13 ASSESSMENT — PATIENT HEALTH QUESTIONNAIRE - PHQ9
SUM OF ALL RESPONSES TO PHQ QUESTIONS 1-9: 16
5. POOR APPETITE OR OVEREATING: 2 - MORE THAN HALF THE DAYS
CLINICAL INTERPRETATION OF PHQ2 SCORE: 4

## 2021-09-13 NOTE — LETTER
[facility]  Pcp Pt States None  No address on file  Fax: [phone]   Authorization for Release/Disclosure of   Protected Health Information   Name: HAKAN APPIAH : 1975 SSN: [id number]   Address: [place] [address] Phone:    [phone] (home)    I authorize the entity listed below to release/disclose the PHI below to:   [facility]/Pcp Pt States None and MARNI Kaur   Provider or Entity Name:     Address   City, State, [facility]   Phone:      Fax:     Reason for request: continuity of care   Information to be released:    [  ] LAST COLONOSCOPY,  including any PATH REPORT and follow-up  [  ] LAST FIT/COLOGUARD RESULT [  ] LAST DEXA  [  ] LAST MAMMOGRAM  [  ] LAST PAP  [  ] LAST LABS [  ] RETINA EXAM REPORT  [  ] IMMUNIZATION RECORDS  [  ] Release all info      [  ] Check here and initial the line next to each item to release ALL health information INCLUDING  _____ Care and treatment for drug and / or alcohol abuse  _____ HIV testing, infection status, or AIDS  _____ Genetic Testing    DATES OF SERVICE OR TIME PERIOD TO BE DISCLOSED: _____________  I understand and acknowledge that:  * This Authorization may be revoked at any time by you in writing, except if your health information has already been used or disclosed.  * Your health information that will be used or disclosed as a result of you signing this authorization could be re-disclosed by the recipient. If this occurs, your re-disclosed health information may no longer be protected by State or Federal laws.  * You may refuse to sign this Authorization. Your refusal will not affect your ability to obtain treatment.  * This Authorization becomes effective upon signing and will  on (date) __________.      If no date is indicated, this Authorization will  one (1) year from the signature date.    Name: Hakan Appiah    Signature:   Date:     2021       PLEASE FAX REQUESTED RECORDS BACK TO:  [phone]

## 2021-09-13 NOTE — ASSESSMENT & PLAN NOTE
Depression Screen (PHQ-2/PHQ-9) 9/13/2021   PHQ-2 Total Score 4   PHQ-9 Total Score 16       Interpretation of PHQ-9 Total Score   Score Severity   1-4 No Depression   5-9 Mild Depression   10-14 Moderate Depression   15-19 Moderately Severe Depression   20-27 Severe Depression  This is a new condition.  Discussed with patient multiple coping exercises and skills that he can do to help him through the grief and loss of his father.  He reports that his father committed suicide recently and he feels more depressed and anxious due to his loss.  Patient denied any suicidal or homicidal ideations.  He reports that he is here today to become healthier as he does not want to end up like his father did and neglect his health.  Patient is not currently in any counseling and declined wanting to start any medications or start counseling today.  We will continue to monitor future appointments.

## 2021-09-13 NOTE — ASSESSMENT & PLAN NOTE
Vitals 1/30/2021 9/1/2021 9/2/2021 9/13/2021   WEIGHT 274.6 276 277 279     Patient reports that he has had increased weight gain over the past 8 months more significantly in the past 3 months.  He reports he has decreased what he is eating however he continues to gain weight.  He denies any outside of work exercise.  He does report daytime drowsiness and fatigue on occasion.      Patient is due for labs.

## 2021-09-13 NOTE — ASSESSMENT & PLAN NOTE
CAITLYN 7 9/13/2021   CAITLYN-7 Total Score 7       Interpretation of CAITLYN 7 Total Score   Score Severity:  0-4 No Anxiety   5-9 Mild Anxiety  10-14 Moderate Anxiety  15-21 Severe Anxiety    This is a new condition.  Please see additional notes in major depressive disorder.

## 2021-09-14 ENCOUNTER — HOSPITAL ENCOUNTER (OUTPATIENT)
Dept: LAB | Facility: MEDICAL CENTER | Age: 46
End: 2021-09-14
Attending: NURSE PRACTITIONER
Payer: COMMERCIAL

## 2021-09-14 DIAGNOSIS — R73.03 PREDIABETES: ICD-10-CM

## 2021-09-14 DIAGNOSIS — R63.5 WEIGHT GAIN: ICD-10-CM

## 2021-09-14 DIAGNOSIS — R20.0 HAND NUMBNESS: ICD-10-CM

## 2021-09-14 LAB
ALBUMIN SERPL BCP-MCNC: 4.2 G/DL (ref 3.2–4.9)
ALBUMIN/GLOB SERPL: 1.6 G/DL
ALP SERPL-CCNC: 67 U/L (ref 30–99)
ALT SERPL-CCNC: 74 U/L (ref 2–50)
ANION GAP SERPL CALC-SCNC: 14 MMOL/L (ref 7–16)
AST SERPL-CCNC: 48 U/L (ref 12–45)
BILIRUB SERPL-MCNC: 0.7 MG/DL (ref 0.1–1.5)
BUN SERPL-MCNC: 15 MG/DL (ref 8–22)
CALCIUM SERPL-MCNC: 9.2 MG/DL (ref 8.5–10.5)
CHLORIDE SERPL-SCNC: 107 MMOL/L (ref 96–112)
CO2 SERPL-SCNC: 20 MMOL/L (ref 20–33)
CREAT SERPL-MCNC: 0.78 MG/DL (ref 0.5–1.4)
EST. AVERAGE GLUCOSE BLD GHB EST-MCNC: 126 MG/DL
GLOBULIN SER CALC-MCNC: 2.6 G/DL (ref 1.9–3.5)
GLUCOSE SERPL-MCNC: 112 MG/DL (ref 65–99)
HBA1C MFR BLD: 6 % (ref 4–5.6)
POTASSIUM SERPL-SCNC: 4.4 MMOL/L (ref 3.6–5.5)
PROT SERPL-MCNC: 6.8 G/DL (ref 6–8.2)
SODIUM SERPL-SCNC: 141 MMOL/L (ref 135–145)
TSH SERPL DL<=0.005 MIU/L-ACNC: 1.8 UIU/ML (ref 0.38–5.33)
VIT B12 SERPL-MCNC: 757 PG/ML (ref 211–911)

## 2021-09-14 PROCEDURE — 80053 COMPREHEN METABOLIC PANEL: CPT

## 2021-09-14 PROCEDURE — 36415 COLL VENOUS BLD VENIPUNCTURE: CPT

## 2021-09-14 PROCEDURE — 84443 ASSAY THYROID STIM HORMONE: CPT

## 2021-09-14 PROCEDURE — 83036 HEMOGLOBIN GLYCOSYLATED A1C: CPT

## 2021-09-14 PROCEDURE — 82607 VITAMIN B-12: CPT

## 2021-09-14 NOTE — PROGRESS NOTES
Chief Complaint   Patient presents with   • Anxiety   • Hypertension       Subjective:     HPI:   Hakan Murrieta is a 45 y.o. male here to discuss the evaluation and management of:      Assessment and Plan:     Depression  Depression Screen (PHQ-2/PHQ-9) 9/13/2021   PHQ-2 Total Score 4   PHQ-9 Total Score 16       Interpretation of PHQ-9 Total Score   Score Severity   1-4 No Depression   5-9 Mild Depression   10-14 Moderate Depression   15-19 Moderately Severe Depression   20-27 Severe Depression  This is a new condition.  Discussed with patient multiple coping exercises and skills that he can do to help him through the grief and loss of his father.  He reports that his father committed suicide recently and he feels more depressed and anxious due to his loss.  Patient denied any suicidal or homicidal ideations.  He reports that he is here today to become healthier as he does not want to end up like his father did and neglect his health.  Patient is not currently in any counseling and declined wanting to start any medications or start counseling today.  We will continue to monitor future appointments.      CAITLYN (generalized anxiety disorder)  CAITLYN 7 9/13/2021   CAITLYN-7 Total Score 7       Interpretation of CAITLYN 7 Total Score   Score Severity:  0-4 No Anxiety   5-9 Mild Anxiety  10-14 Moderate Anxiety  15-21 Severe Anxiety    This is a new condition.  Please see additional notes in major depressive disorder.    Weight gain  Vitals 1/30/2021 9/1/2021 9/2/2021 9/13/2021   WEIGHT 274.6 276 277 279     Patient reports that he has had increased weight gain over the past 8 months more significantly in the past 3 months.  He reports he has decreased what he is eating however he continues to gain weight.  He denies any outside of work exercise.  He does report daytime drowsiness and fatigue on occasion.      Patient is due for labs.    Cough  This is a chronic condition that has been worked up by his previous primary care  provider for approximately 2 years.  They have tried Flonase and Zyrtec without any relief.  He was also placed on omeprazole for approximately 30 days which also did not help with the symptoms.  Patient was supposed to be referred to pulmonology however due to Covid his appointments got canceled for sleep study.    Patient reports that his coughs are inconsistent with season, types of food he eats, or alcohol use.  Patient does use chewing tobacco.  Did discuss this could be what is triggering his chronic cough.  He indicates that at times his cough become so violent that he ends up on the floor in the fetal position coughing for several minutes.    Highly encourage patient to stay on omeprazole.  Encouraged him to start back on his allergy medications including Zyrtec and Flonase.    Snoring  Patient reports that this is a chronic condition that his previous primary care provider tried to work him up with sleep study however he was unable to make his appointment due to Covid last year.  Discussed with patient's importance of following up with sleep medicine as snoring can have lasting effects on his health due to sleep apnea.  Patient was agreeable to referral to pulmonology.  Referral was placed today.      Tobacco user  This is a chronic condition.  Patient reports that he uses chewing tobacco multiple times throughout the day.  Discussed risks of using tattooing tobacco with patient including that this could be the cause of his chronic cough.  Patient denies wanting to quit today.  Counseled patient on quitting chewing tobacco for approximately 5 minutes.    Hand numbness  This is a new condition being assessed today.  Patient reports that he has had a 2-year history of off and on bilateral hand numbness that is worse in the morning.  He reports that on occasion it happens throughout the day.  He denies any numbness or tingling during the office visit today.  He denies any loss of strength in his arms.  He  cannot identify specific fingers and just indicates that it is his whole hand that becomes numb.    Denies history of carpal tunnel syndrome.  Labs ordered as indicated below.  Discussed possible further work-up with nerve conduction study in the future.          ROS:  Gen: Positive per HPI  Eyes: no changes in vision  ENT: no sore throat, no hearing loss, no bloody nose  Pulm: Positive per HPI  CV: no chest pain, no palpitations  GI: no nausea/vomiting, no diarrhea  MSk: no myalgias  Skin: no rash  Neuro: Positive per HPI   heme/Lymph: no easy bruising    No Known Allergies    Current medicines (including changes today)  No current outpatient medications on file.     No current facility-administered medications for this visit.       Social History     Tobacco Use   • Smoking status: Former Smoker     Packs/day: 0.25     Years: 10.00     Pack years: 2.50     Types: Cigars   • Smokeless tobacco: Current User     Types: Chew     Last attempt to quit: 1/1/2016   Vaping Use   • Vaping Use: Never used   Substance Use Topics   • Alcohol use: Yes     Alcohol/week: 1.8 oz     Types: 3 Cans of beer per week     Comment: weekly   • Drug use: No       Patient Active Problem List    Diagnosis Date Noted   • Allergic rhinitis due to pollen 09/13/2021   • Elevated blood pressure reading without diagnosis of hypertension 09/13/2021   • Hypertriglyceridemia 09/13/2021   • Mild intermittent asthma 09/13/2021   • Obesity 09/13/2021   • Prediabetes 09/13/2021   • Reactive airway disease 09/13/2021   • Tobacco user 09/13/2021   • Depression 09/13/2021   • CAITLYN (generalized anxiety disorder) 09/13/2021   • Weight gain 09/13/2021   • Cough 09/13/2021   • Snoring 09/13/2021   • Hand numbness 09/13/2021       Family History   Problem Relation Age of Onset   • No Known Problems Mother    • Suicide Attempts Father    • Diabetes Maternal Grandmother    • Dementia Maternal Grandfather    • Diabetes Paternal Grandmother    • Rheumatologic  "Disease Paternal Grandmother    • Heart Attack Paternal Grandfather           Objective:         /94 (BP Location: Left arm, Patient Position: Sitting, BP Cuff Size: Adult long)   Pulse 78   Temp 37.2 °C (98.9 °F) (Temporal)   Resp 12   Ht 1.803 m (5' 11\")   Wt (!) 127 kg (279 lb)   SpO2 98%  Body mass index is 38.91 kg/m².    Physical Exam:  Constitutional: Well-developed and obese male in NAD. Not diaphoretic. No distress.   Skin: warm, dry, intact, no evidence of rash or concerning lesions  Head: Atraumatic without lesions.  Eyes: Conjunctivae and extraocular motions are normal. Pupils are equal, round, and reactive to light. No scleral icterus.   Ears:  External ears unremarkable.  TMs bilateral serosanguineous otitis media, without bulging, retraction, or tragus pain  Mouth/Throat: Tongue normal. Oropharynx is clear and moist. Posterior pharynx without erythema or exudates.  Receding gumline on right side of the lower incisors.  Neck: Supple, trachea midline. No thyromegaly present. No cervical or supraclavicular lymphadenopathy.  Cardiovascular: Regular rate and rhythm without murmur. Radial pulses are intact and equal bilaterally.  Pulmonary: Clear to ausculation. Normal effort. No rales, ronchi, or wheezing.  Extremities: No cyanosis, clubbing, erythema, nor edema.   Neurological: Alert and oriented x 3. No cranial nerve deficit. 5/5 myotomes. Sensation intact.   Psychiatric:  Behavior, mood, and affect are appropriate.       The following treatment plan was discussed:    1. Encounter to establish care  Pleasant 45-year-old male presents today to establish care.  I have reviewed and updated his medical history, surgical history, allergies, medications, and social determinants of health.  Patient is a chronic chewing tobacco user.  Patient reports his last labs were done over a year ago.    2. Current moderate episode of major depressive disorder without prior episode (HCC)  This is a new " condition.  We will continue to monitor future appointments.  Patient is grieving the loss of his father.  Denies suicidal ideations.    - Patient has been identified as having a positive depression screening. Appropriate orders and counseling have been given.    3. CAITLYN (generalized anxiety disorder)  - Patient has been identified as having a positive depression screening. Appropriate orders and counseling have been given.    4. Tobacco user  This is a chronic condition.  Patient continues to use chewing tobacco.  He is aware of the risks.   was given for approximately 5 minutes about risks of chewing tobacco.    5. Cough  This is a chronic condition that has been worked up by his previous primary care provider for approximately 2 years.  Discussed the importance of starting allergy medications as well as acid reflux medications to see if this will help with symptoms.  Also discussed the importance of stopping chewing tobacco as this is most likely causing his chronic cough.    - REFERRAL TO PULMONARY AND SLEEP MEDICINE    6. Weight gain  This is a new condition.  We will continue to monitor future appointments.  Discussed diet and lifestyle modifications.  Will evaluate for hypothyroidism.  Labs indicated below.  - TSH WITH REFLEX TO FT4; Future    7. Snoring  Referral to sleep medicine was placed due to long history of snoring.  - REFERRAL TO PULMONARY AND SLEEP MEDICINE    8. Hand numbness  This is a new condition.  Discussed with patient multiple causes of bilateral hand numbness including vitamin B12 deficiency and/or carpal tunnel syndrome.  Labs have been ordered as indicated below.  Possible future work-up includes nerve conduction study.  - VITAMIN B12; Future    9. Prediabetes  Discussed diet, exercise, weight loss, behavioral modification, portion size management.    - Comp Metabolic Panel; Future  - HEMOGLOBIN A1C; Future       Any change or worsening of signs or symptoms, patient encouraged to  follow-up or report to emergency room for further evaluation. Patient verbalizes understanding and agrees.    Follow-Up: Return in about 4 weeks (around 10/11/2021) for Follow up labs.      PLEASE NOTE: This dictation was created using voice recognition software. I have made every reasonable attempt to correct obvious errors, but I expect that there are errors of grammar and possibly content that I did not discover before finalizing the note.

## 2021-09-14 NOTE — ASSESSMENT & PLAN NOTE
This is a chronic condition.  Patient reports that he uses chewing tobacco multiple times throughout the day.  Discussed risks of using tattooing tobacco with patient including that this could be the cause of his chronic cough.  Patient denies wanting to quit today.  Counseled patient on quitting chewing tobacco for approximately 5 minutes.

## 2021-09-14 NOTE — ASSESSMENT & PLAN NOTE
This is a new condition being assessed today.  Patient reports that he has had a 2-year history of off and on bilateral hand numbness that is worse in the morning.  He reports that on occasion it happens throughout the day.  He denies any numbness or tingling during the office visit today.  He denies any loss of strength in his arms.  He cannot identify specific fingers and just indicates that it is his whole hand that becomes numb.    Denies history of carpal tunnel syndrome.  Labs ordered as indicated below.  Discussed possible further work-up with nerve conduction study in the future.

## 2021-09-14 NOTE — ASSESSMENT & PLAN NOTE
Patient reports that this is a chronic condition that his previous primary care provider tried to work him up with sleep study however he was unable to make his appointment due to Covid last year.  Discussed with patient's importance of following up with sleep medicine as snoring can have lasting effects on his health due to sleep apnea.  Patient was agreeable to referral to pulmonology.  Referral was placed today.

## 2021-09-14 NOTE — ASSESSMENT & PLAN NOTE
This is a chronic condition that has been worked up by his previous primary care provider for approximately 2 years.  They have tried Flonase and Zyrtec without any relief.  He was also placed on omeprazole for approximately 30 days which also did not help with the symptoms.  Patient was supposed to be referred to pulmonology however due to Covid his appointments got canceled for sleep study.    Patient reports that his coughs are inconsistent with season, types of food he eats, or alcohol use.  Patient does use chewing tobacco.  Did discuss this could be what is triggering his chronic cough.  He indicates that at times his cough become so violent that he ends up on the floor in the fetal position coughing for several minutes.    Highly encourage patient to stay on omeprazole.  Encouraged him to start back on his allergy medications including Zyrtec and Flonase.

## 2021-09-21 ENCOUNTER — HOSPITAL ENCOUNTER (OUTPATIENT)
Facility: MEDICAL CENTER | Age: 46
End: 2021-09-21
Attending: NURSE PRACTITIONER
Payer: COMMERCIAL

## 2021-09-21 ENCOUNTER — OFFICE VISIT (OUTPATIENT)
Dept: URGENT CARE | Facility: PHYSICIAN GROUP | Age: 46
End: 2021-09-21
Payer: COMMERCIAL

## 2021-09-21 VITALS
BODY MASS INDEX: 39.06 KG/M2 | RESPIRATION RATE: 16 BRPM | TEMPERATURE: 98.6 F | SYSTOLIC BLOOD PRESSURE: 118 MMHG | DIASTOLIC BLOOD PRESSURE: 60 MMHG | OXYGEN SATURATION: 99 % | HEIGHT: 71 IN | HEART RATE: 94 BPM | WEIGHT: 279 LBS

## 2021-09-21 DIAGNOSIS — R68.89 FLU-LIKE SYMPTOMS: ICD-10-CM

## 2021-09-21 DIAGNOSIS — Z20.828 CONTACT WITH AND (SUSPECTED) EXPOSURE TO OTHER VIRAL COMMUNICABLE DISEASES: ICD-10-CM

## 2021-09-21 PROCEDURE — U0005 INFEC AGEN DETEC AMPLI PROBE: HCPCS

## 2021-09-21 PROCEDURE — 99213 OFFICE O/P EST LOW 20 MIN: CPT | Mod: CS | Performed by: NURSE PRACTITIONER

## 2021-09-21 PROCEDURE — U0003 INFECTIOUS AGENT DETECTION BY NUCLEIC ACID (DNA OR RNA); SEVERE ACUTE RESPIRATORY SYNDROME CORONAVIRUS 2 (SARS-COV-2) (CORONAVIRUS DISEASE [COVID-19]), AMPLIFIED PROBE TECHNIQUE, MAKING USE OF HIGH THROUGHPUT TECHNOLOGIES AS DESCRIBED BY CMS-2020-01-R: HCPCS

## 2021-09-21 ASSESSMENT — ENCOUNTER SYMPTOMS
WHEEZING: 0
MYALGIAS: 0
SPUTUM PRODUCTION: 0
COUGH: 1
SORE THROAT: 1
NAUSEA: 0
FEVER: 0
HEADACHES: 1
SHORTNESS OF BREATH: 0
DIARRHEA: 0
CHILLS: 0

## 2021-09-21 NOTE — PROGRESS NOTES
Subjective     Hakan Murrieta is a 45 y.o. male who presents with Coronavirus Screening (fever, ear ache, sore throat, head cold symptoms x4 days )            HPI   New problem.  Patient is a 45-year-old male who presents for coronavirus screening.  He reports a 4-day history of fever, earache, sore throat, congestion, and headache.  He states his son has tested positive for Covid at school yesterday and his daughter is being tested today.  He is not vaccinated for COVID-19.  He has a dry cough but no shortness of breath or wheezing.  He denies nausea or diarrhea.  He has been taking Tessalon Perles, and montelukast for his symptoms.  Patient has no known allergies.  No current outpatient medications on file prior to visit.     No current facility-administered medications on file prior to visit.     Social History     Socioeconomic History   • Marital status: Single     Spouse name: Not on file   • Number of children: Not on file   • Years of education: Not on file   • Highest education level: Not on file   Occupational History   • Not on file   Tobacco Use   • Smoking status: Former Smoker     Packs/day: 0.25     Years: 10.00     Pack years: 2.50     Types: Cigars   • Smokeless tobacco: Current User     Types: Chew     Last attempt to quit: 1/1/2016   Vaping Use   • Vaping Use: Never used   Substance and Sexual Activity   • Alcohol use: Yes     Alcohol/week: 1.8 oz     Types: 3 Cans of beer per week     Comment: weekly   • Drug use: No   • Sexual activity: Yes     Partners: Female     Birth control/protection: Male Sterilization   Other Topics Concern   • Not on file   Social History Narrative   • Not on file     Social Determinants of Health     Financial Resource Strain:    • Difficulty of Paying Living Expenses:    Food Insecurity:    • Worried About Running Out of Food in the Last Year:    • Ran Out of Food in the Last Year:    Transportation Needs:    • Lack of Transportation (Medical):    • Lack of  "Transportation (Non-Medical):    Physical Activity:    • Days of Exercise per Week:    • Minutes of Exercise per Session:    Stress:    • Feeling of Stress :    Social Connections:    • Frequency of Communication with Friends and Family:    • Frequency of Social Gatherings with Friends and Family:    • Attends Jew Services:    • Active Member of Clubs or Organizations:    • Attends Club or Organization Meetings:    • Marital Status:    Intimate Partner Violence:    • Fear of Current or Ex-Partner:    • Emotionally Abused:    • Physically Abused:    • Sexually Abused:      Breast Cancer-related family history is not on file.      Review of Systems   Constitutional: Positive for malaise/fatigue. Negative for chills and fever.   HENT: Positive for congestion, ear pain and sore throat.    Respiratory: Positive for cough. Negative for sputum production, shortness of breath and wheezing.    Gastrointestinal: Negative for diarrhea and nausea.   Musculoskeletal: Negative for myalgias.   Neurological: Positive for headaches.              Objective     /60   Pulse 94   Temp 37 °C (98.6 °F) (Temporal)   Resp 16   Ht 1.803 m (5' 11\")   Wt (!) 127 kg (279 lb)   SpO2 99%   BMI 38.91 kg/m²      Physical Exam  Vitals and nursing note reviewed.   Constitutional:       General: He is not in acute distress.     Appearance: He is well-developed. He is obese.   HENT:      Head: Normocephalic and atraumatic.      Right Ear: Tympanic membrane, ear canal and external ear normal. No middle ear effusion. Tympanic membrane is not injected or perforated.      Left Ear: Tympanic membrane, ear canal and external ear normal.  No middle ear effusion. Tympanic membrane is not injected or perforated.      Nose: Mucosal edema, congestion and rhinorrhea present.      Mouth/Throat:      Pharynx: No oropharyngeal exudate or posterior oropharyngeal erythema.   Eyes:      General:         Right eye: No discharge.         Left eye: No " discharge.      Conjunctiva/sclera: Conjunctivae normal.   Cardiovascular:      Rate and Rhythm: Normal rate and regular rhythm.      Heart sounds: Normal heart sounds. No murmur heard.     Pulmonary:      Effort: Pulmonary effort is normal. No respiratory distress.      Breath sounds: Normal breath sounds. No wheezing.   Musculoskeletal:         General: Normal range of motion.      Cervical back: Normal range of motion and neck supple.      Comments: Normal movement of all 4 extremities.   Lymphadenopathy:      Cervical: No cervical adenopathy.      Upper Body:      Right upper body: No supraclavicular adenopathy.      Left upper body: No supraclavicular adenopathy.   Skin:     General: Skin is warm and dry.   Neurological:      Mental Status: He is alert and oriented to person, place, and time.      Gait: Gait normal.   Psychiatric:         Behavior: Behavior normal.         Thought Content: Thought content normal.                             Assessment & Plan        1. Contact with and (suspected) exposure to other viral communicable diseases     2. Flu-like symptoms  COVID/SARS CoV-2 PCR     -Vital signs stable on this visit.  -Counseled on quarantine protocol per CDC guidelines.  -Results will be released to [employer] account in 24-48 hours.  -Strict ED precautions and follow up instructions given to patient.  -Reviewed with patient symptomatic care with otc medications as directed.  - work note (if needed) to patient at discharge.  -Discharged in stable condition from clinic today.

## 2021-09-22 LAB
COVID ORDER STATUS COVID19: NORMAL
SARS-COV-2 RNA RESP QL NAA+PROBE: DETECTED
SPECIMEN SOURCE: ABNORMAL

## 2021-09-23 ENCOUNTER — HOSPITAL ENCOUNTER (EMERGENCY)
Facility: MEDICAL CENTER | Age: 46
End: 2021-09-23
Attending: EMERGENCY MEDICINE
Payer: COMMERCIAL

## 2021-09-23 ENCOUNTER — APPOINTMENT (OUTPATIENT)
Dept: RADIOLOGY | Facility: MEDICAL CENTER | Age: 46
End: 2021-09-23
Attending: EMERGENCY MEDICINE
Payer: COMMERCIAL

## 2021-09-23 VITALS
HEART RATE: 82 BPM | SYSTOLIC BLOOD PRESSURE: 153 MMHG | RESPIRATION RATE: 25 BRPM | OXYGEN SATURATION: 94 % | DIASTOLIC BLOOD PRESSURE: 100 MMHG | HEIGHT: 70 IN | WEIGHT: 279 LBS | BODY MASS INDEX: 39.94 KG/M2 | TEMPERATURE: 97.3 F

## 2021-09-23 DIAGNOSIS — U07.1 COVID-19: ICD-10-CM

## 2021-09-23 PROCEDURE — 700111 HCHG RX REV CODE 636 W/ 250 OVERRIDE (IP): Performed by: EMERGENCY MEDICINE

## 2021-09-23 PROCEDURE — 99284 EMERGENCY DEPT VISIT MOD MDM: CPT

## 2021-09-23 PROCEDURE — 94640 AIRWAY INHALATION TREATMENT: CPT

## 2021-09-23 PROCEDURE — M0243 CASIRIVI AND IMDEVI INFUSION: HCPCS

## 2021-09-23 PROCEDURE — 700102 HCHG RX REV CODE 250 W/ 637 OVERRIDE(OP): Performed by: EMERGENCY MEDICINE

## 2021-09-23 PROCEDURE — 71045 X-RAY EXAM CHEST 1 VIEW: CPT

## 2021-09-23 PROCEDURE — A9270 NON-COVERED ITEM OR SERVICE: HCPCS | Performed by: EMERGENCY MEDICINE

## 2021-09-23 RX ORDER — BENZONATATE 100 MG/1
100 CAPSULE ORAL 3 TIMES DAILY PRN
Qty: 60 CAPSULE | Refills: 0 | Status: SHIPPED | OUTPATIENT
Start: 2021-09-23 | End: 2021-10-28

## 2021-09-23 RX ORDER — ALBUTEROL SULFATE 90 UG/1
2 AEROSOL, METERED RESPIRATORY (INHALATION) ONCE
Status: COMPLETED | OUTPATIENT
Start: 2021-09-23 | End: 2021-09-23

## 2021-09-23 RX ORDER — ALBUTEROL SULFATE 90 UG/1
2 AEROSOL, METERED RESPIRATORY (INHALATION) EVERY 6 HOURS PRN
Qty: 8.5 G | Refills: 0 | Status: SHIPPED | OUTPATIENT
Start: 2021-09-23 | End: 2022-01-20 | Stop reason: SDUPTHER

## 2021-09-23 RX ADMIN — CASIRIVIMAB AND IMDEVIMAB 300 MG: 600; 600 INJECTION, SOLUTION, CONCENTRATE INTRAVENOUS at 16:30

## 2021-09-23 RX ADMIN — ALBUTEROL SULFATE 2 PUFF: 90 AEROSOL, METERED RESPIRATORY (INHALATION) at 14:49

## 2021-09-23 NOTE — ED TRIAGE NOTES
"Hakan Murrieta  45 y.o. male  Chief Complaint   Patient presents with   • Generalized Body Aches     Found out yesterday COVID+; generalized body aches, pt states just \"getting worse\"       Vitals:    09/23/21 1153   BP: 144/84   Pulse: 92   Resp: 16   Temp: 36.2 °C (97.2 °F)   SpO2: 93%        Patient ambulatory into triage for the complaint above. Pt reports that yesterday they found out they were COVID+. Pt reports just generalized body aches and getting worse. Pt is unvaccinated.     Patient is in stable condition at time of triage, has been educated on the triage process and placed back into the ED lobby at this time - pt has verbalized understanding of this process.     RN encouraged patient to alert staff at front for any changes that may occur while they are waiting to be evaluated by an ERP.     Of note, this RN and patient are in proper PPE and has a mask in place at all times during this encounter.     Past Medical History:   Diagnosis Date   • Asthma    • Depression 9/13/2021   • CAITLYN (generalized anxiety disorder) 9/13/2021   • GERD (gastroesophageal reflux disease)    • Hyperlipidemia    • Migraine         "

## 2021-09-23 NOTE — ED NOTES
ERP at bedside    Patient presents after being diagnosed with COVID yesterday and having worsening flu like symptoms. VSS on monitor. Will continue to assess

## 2021-09-23 NOTE — ED PROVIDER NOTES
"ED Provider Note    CHIEF COMPLAINT  Chief Complaint   Patient presents with   • Generalized Body Aches     Found out yesterday COVID+; generalized body aches, pt states just \"getting worse\"       HPI  Hakan Murrieta is a 45 y.o. male here for evaluation of generalized body aches and a recent covid test. The pt states he tested positive a few days ago, and states ' I feel worse.'  He has no fever/chills, but does c/o some myalgias.  No vomiting. No cp.  Pt has no sob, and no abdominal pain. He has not taken anything pta for the same. No prior treatment.      ROS;  Please see HPI  O/W negative     PAST MEDICAL HISTORY   has a past medical history of Asthma, Depression (9/13/2021), CAITLYN (generalized anxiety disorder) (9/13/2021), GERD (gastroesophageal reflux disease), Hyperlipidemia, and Migraine.    SOCIAL HISTORY  Social History     Tobacco Use   • Smoking status: Former Smoker     Packs/day: 0.25     Years: 10.00     Pack years: 2.50     Types: Cigars   • Smokeless tobacco: Current User     Types: Chew     Last attempt to quit: 1/1/2016   Vaping Use   • Vaping Use: Never used   Substance and Sexual Activity   • Alcohol use: Yes     Alcohol/week: 1.8 oz     Types: 3 Cans of beer per week     Comment: weekly   • Drug use: No   • Sexual activity: Yes     Partners: Female     Birth control/protection: Male Sterilization       SURGICAL HISTORY   has a past surgical history that includes cholecystectomy; tonsillectomy and adenoidectomy; inguinal hernia repair child (Right); and vasectomy.    CURRENT MEDICATIONS  Home Medications     Reviewed by Gagan Leach R.N. (Registered Nurse) on 09/23/21 at 1200  Med List Status: Not Addressed   Medication Last Dose Status        Patient Krishna Taking any Medications                       ALLERGIES  No Known Allergies    REVIEW OF SYSTEMS  See HPI for further details. Review of systems as above, otherwise all other systems are negative.     PHYSICAL EXAM  VITAL SIGNS: BP " "143/72   Pulse 75   Temp 36.2 °C (97.2 °F) (Temporal)   Resp 16   Ht 1.778 m (5' 10\")   Wt (!) 127 kg (279 lb)   SpO2 94%   BMI 40.03 kg/m²     Constitutional: Well developed, well nourished. No acute distress.  HEENT: Normocephalic, atraumatic. MMM  Neck: Supple, Full range of motion   Chest/Pulmonary:  No respiratory distress.  Equal expansion   Musculoskeletal: No deformity, no edema, neurovascular intact.   Neuro: Clear speech, appropriate, cooperative, cranial nerves II-XII grossly intact.  Psych: Normal mood and affect      PROCEDURES     MEDICAL RECORD  I have reviewed patient's medical record and pertinent results are listed.    COURSE & MEDICAL DECISION MAKING  I have reviewed any medical record information, laboratory studies and radiographic results as noted above.      DX-CHEST-LIMITED (1 VIEW)   Final Result      1.  Minimal LEFT lung base atelectasis.   2.  No lobar pneumonia or pneumothorax.            3:10 PM  pts pulse ox is 94% on Room air, and he has a negative chest x ray.  At this time, he will be sent home for follow up.  I offered the pt Regeneron, but he has declined.     3:23 PM  The pt has changed his mind, and would like the Regeneron.     Monoclonal antibody infusion EUA progress note    This patient is considered a candidate for monoclonal antibody infusion to treat/prevent high risk SARS-CoV-2 virus infection.      Criteria for use (treatment):  -Mild to moderate illness for less than 10 days and is high risk for progressing to severe COVID-19 and/or hospitalization.    -Not hospitalized.   -Does not require oxygen therapy due to COVID-19.    ->40 kg and 12 years old or greater    Criteria for use (post-exposure prophylaxis):  -High risk for progressing to severe COVID-19 and/or hospitalization  -Exposed (close contact per CDC) to COVID-19 positive individual OR high risk of exposure to COVID-19 because of positive individual in institutional setting   -Not fully vaccinated or " not expected to mount an adequate immune response due to immunocompromising condition or immunosuppressive medication  ->40 kg and 12 years old or greater    Date of positive COVID-19 test (treatment) or known exposure (post-exposure prophylaxis): 6 days ago    Vaccinated for COVID-19 (yes/no):no     Symptoms of COVID-19 (if being used for treatment): yes    High risk criteria:   -Age of 65 or greater  -Body mass index of 25 kg/m2or greater  -12-17 years of age and have  -BMI greater than or equal to 85% of their age and gender based CDC growth chart  -Chronic kidney disease  -Diabetes  -Pregnancy  -Immunosuppressive disease  -Receiving immunosuppressive treatment  -Chronic lung disease  -65 years of age or greater  -Cardiovascular disease  -Hypertension  -Medical related technological dependence  -Sickle cell disease  -Congenital or acquired heart disease  -Neurodevelopmental disorder  -Other medical conditions or factors that place individual patients at high risk for progression to severe COVID-19 and authorization of REGEN-COV under the EUA is not limited to the medical conditions or factors listed above. Healthcare providers should consider the benefit-risk for an individual patient.    This patient has been given the EUA patient fact sheet and consents to receiving this medication.            I you have had any blood pressure issues while here in the emergency department, please see your doctor for a further evaluation or work up.    Differential diagnoses include but not limited to: covid, pneumonia, uri    This patient presents with covid .  At this time, I have counseled the patient/family regarding their medications, pain control, and follow up.  They will continue their medications, if any, as prescribed.  They will return immediately for any worsening symptoms and/or any other medical concerns.  They will see their doctor, or contact the doctor provided, in 1-2 days for follow up.       FINAL  IMPRESSION  covid      Electronically signed by: Eduardo North D.O., 9/23/2021 3:10 PM       No

## 2021-09-24 NOTE — ED NOTES
Discharge instructions reviewed with patient and signed. They were instructed on how to  prescriptions. They verbalized understanding of follow up instructions. All belongings with patient. They ambulate with a steady gait

## 2021-09-26 DIAGNOSIS — U07.1 COVID-19 VIRUS INFECTION: ICD-10-CM

## 2021-09-26 RX ORDER — DEXAMETHASONE 6 MG/1
6 TABLET ORAL DAILY
Qty: 7 TABLET | Refills: 0 | Status: SHIPPED | OUTPATIENT
Start: 2021-09-26 | End: 2021-10-03

## 2021-09-27 ENCOUNTER — APPOINTMENT (OUTPATIENT)
Dept: URGENT CARE | Facility: PHYSICIAN GROUP | Age: 46
End: 2021-09-27
Payer: COMMERCIAL

## 2021-09-27 ENCOUNTER — APPOINTMENT (OUTPATIENT)
Dept: RADIOLOGY | Facility: IMAGING CENTER | Age: 46
End: 2021-09-27
Attending: PHYSICIAN ASSISTANT
Payer: COMMERCIAL

## 2021-09-27 DIAGNOSIS — R06.02 SOB (SHORTNESS OF BREATH): ICD-10-CM

## 2021-09-27 DIAGNOSIS — J18.9 PNEUMONIA OF BOTH LOWER LOBES DUE TO INFECTIOUS ORGANISM: ICD-10-CM

## 2021-09-27 PROCEDURE — 71046 X-RAY EXAM CHEST 2 VIEWS: CPT | Mod: TC,FY | Performed by: PHYSICIAN ASSISTANT

## 2021-09-27 RX ORDER — AZITHROMYCIN 250 MG/1
TABLET, FILM COATED ORAL
Qty: 6 TABLET | Refills: 0 | Status: SHIPPED | OUTPATIENT
Start: 2021-09-27 | End: 2021-10-11

## 2021-09-29 ENCOUNTER — TELEPHONE (OUTPATIENT)
Dept: URGENT CARE | Facility: PHYSICIAN GROUP | Age: 46
End: 2021-09-29

## 2021-10-11 ENCOUNTER — OFFICE VISIT (OUTPATIENT)
Dept: MEDICAL GROUP | Facility: PHYSICIAN GROUP | Age: 46
End: 2021-10-11
Payer: COMMERCIAL

## 2021-10-11 ENCOUNTER — APPOINTMENT (OUTPATIENT)
Dept: RADIOLOGY | Facility: IMAGING CENTER | Age: 46
End: 2021-10-11
Attending: NURSE PRACTITIONER
Payer: COMMERCIAL

## 2021-10-11 VITALS
RESPIRATION RATE: 16 BRPM | OXYGEN SATURATION: 97 % | DIASTOLIC BLOOD PRESSURE: 82 MMHG | HEIGHT: 71 IN | BODY MASS INDEX: 38.36 KG/M2 | HEART RATE: 72 BPM | WEIGHT: 274 LBS | TEMPERATURE: 97.4 F | SYSTOLIC BLOOD PRESSURE: 128 MMHG

## 2021-10-11 DIAGNOSIS — R03.0 ELEVATED BLOOD PRESSURE READING WITHOUT DIAGNOSIS OF HYPERTENSION: ICD-10-CM

## 2021-10-11 DIAGNOSIS — R74.8 ELEVATED LIVER ENZYMES: ICD-10-CM

## 2021-10-11 DIAGNOSIS — Z13.21 ENCOUNTER FOR VITAMIN DEFICIENCY SCREENING: ICD-10-CM

## 2021-10-11 DIAGNOSIS — U07.1 COVID-19: ICD-10-CM

## 2021-10-11 DIAGNOSIS — E66.09 CLASS 2 OBESITY DUE TO EXCESS CALORIES WITH BODY MASS INDEX (BMI) OF 38.0 TO 38.9 IN ADULT, UNSPECIFIED WHETHER SERIOUS COMORBIDITY PRESENT: ICD-10-CM

## 2021-10-11 DIAGNOSIS — R73.03 PREDIABETES: ICD-10-CM

## 2021-10-11 DIAGNOSIS — E78.1 HYPERTRIGLYCERIDEMIA: ICD-10-CM

## 2021-10-11 PROCEDURE — 99214 OFFICE O/P EST MOD 30 MIN: CPT | Mod: CS | Performed by: NURSE PRACTITIONER

## 2021-10-11 PROCEDURE — 71046 X-RAY EXAM CHEST 2 VIEWS: CPT | Mod: TC,FY,CS | Performed by: NURSE PRACTITIONER

## 2021-10-11 NOTE — ASSESSMENT & PLAN NOTE
This is a chronic and stable condition.  Blood pressure is well controlled with healthy diet and lifestyle modifications.  Blood pressure today in clinic is 128/82.  Patient denies any headache, palpitations, or syncopal episodes.

## 2021-10-11 NOTE — ASSESSMENT & PLAN NOTE
This is a new condition.  Component      Latest Ref Rng & Units 9/14/2021   AST(SGOT)      12 - 45 U/L 48 (H)   ALT(SGPT)      2 - 50 U/L 74 (H)     Reviewed labs with patient and discussed multiple causes of elevated AST and ALT levels.  Patient denies excessive alcohol use and reports that he has cut back and almost completely cut out drinking as of August.  He denies any right upper quadrant pain, yellowing of the skin, or nausea or vomiting.  We will reassess labs in 3 months.

## 2021-10-11 NOTE — ASSESSMENT & PLAN NOTE
Patient was diagnosed with Covid on 9-.  He followed up in the urgent care and had an x-ray performed which did not show pneumonia on 23 September.  On September 23 he was seen in the emergency department and received the Regeneron injections.  On September 27 he had increased shortness of breath and had a repeat x-ray which indicated pneumonia.  He was treated with steroids and azithromycin.    He has now completed his course of antibiotics and steroids and indicates that he is feeling better however still has chest pain/pressure, shortness of breath, fatigue, headaches, continues to have moderate loss of taste and smell, and chronic dry cough.  He denies any fever.    He is currently using Tessalon Perles and albuterol as needed.  He is also using Mucinex to help with chest congestion.  Encourage patient to continue using medications as prescribed to help with cough however we do not want to completely suppress his cough so he is able to evacuate some of the phlegm.  Also encouraged him to stay well-hydrated.    Encourage patient to receive Covid vaccine.

## 2021-10-11 NOTE — PROGRESS NOTES
Chief Complaint   Patient presents with   • Cough     had COVID Px 3 weeks ago still not better       Subjective:     HPI:   Hakan Murrieta is a 45 y.o. male here to discuss the evaluation and management of:      COVID-19  Patient was diagnosed with Covid on 9-.  He followed up in the urgent care and had an x-ray performed which did not show pneumonia on 23 September.  On September 23 he was seen in the emergency department and received the Regeneron injections.  On September 27 he had increased shortness of breath and had a repeat x-ray which indicated pneumonia.  He was treated with steroids and azithromycin.    He has now completed his course of antibiotics and steroids and indicates that he is feeling better however still has chest pain/pressure, shortness of breath, fatigue, headaches, continues to have moderate loss of taste and smell, and chronic dry cough.  He denies any fever.    He is currently using Tessalon Perles and albuterol as needed.  He is also using Mucinex to help with chest congestion.  Encourage patient to continue using medications as prescribed to help with cough however we do not want to completely suppress his cough so he is able to evacuate some of the phlegm.  Also encouraged him to stay well-hydrated.    Encourage patient to receive Covid vaccine.    Elevated blood pressure reading without diagnosis of hypertension  This is a chronic and stable condition.  Blood pressure is well controlled with healthy diet and lifestyle modifications.  Blood pressure today in clinic is 128/82.  Patient denies any headache, palpitations, or syncopal episodes.    Hypertriglyceridemia  Is a chronic condition current status unknown.  Patient is due for labs.  Patient is not currently taking any statin medications.  Labs ordered as indicated below.  Discussed the importance of healthy diet lifestyle modifications.    Prediabetes  This is a chronic condition.  Patient's A1c level is 6.  Discussed  with patient the risks of prediabetes and appropriate diet and exercise to help control his A1c levels.  Discussed if A1c level gets between 6.3 and 6.4 possibly starting Metformin to help prevent secondary conditions associated with diabetes.  He denies any symptoms of hyper or hypoglycemic events.    Obesity  This is a chronic condition.  Patient's BMI is 8.22 with a weight of 274 pounds.  Patient indicates that he is well aware of his weight gain and knows he needs to work on healthy diet lifestyle modifications to help with his obesity.   was given today about appropriate diet plan for pre by diabetes as well as exercising at least 150 minutes/week to help control his obesity.    Elevated liver enzymes  This is a new condition.  Component      Latest Ref Rng & Units 9/14/2021   AST(SGOT)      12 - 45 U/L 48 (H)   ALT(SGPT)      2 - 50 U/L 74 (H)     Reviewed labs with patient and discussed multiple causes of elevated AST and ALT levels.  Patient denies excessive alcohol use and reports that he has cut back and almost completely cut out drinking as of August.  He denies any right upper quadrant pain, yellowing of the skin, or nausea or vomiting.  We will reassess labs in 3 months.        ROS:  Gen: no fevers/chills, no changes in weight  Eyes: no changes in vision  ENT: Positive per HPI   Pulm: Positive per HPI  CV: no chest pain, no palpitations  GI: no nausea/vomiting, no diarrhea  : no dysuria  MSk: no myalgias  Skin: no rash  Neuro: Positive per HPI  Heme/Lymph: no easy bruising    No Known Allergies    Current medicines (including changes today)  Current Outpatient Medications   Medication Sig Dispense Refill   • albuterol 108 (90 Base) MCG/ACT Aero Soln inhalation aerosol Inhale 2 Puffs every 6 hours as needed for Shortness of Breath. 8.5 g 0   • benzonatate (TESSALON) 100 MG Cap Take 1 Capsule by mouth 3 times a day as needed for Cough. 60 Capsule 0     No current facility-administered medications  "for this visit.       Social History     Tobacco Use   • Smoking status: Former Smoker     Packs/day: 0.25     Years: 10.00     Pack years: 2.50     Types: Cigars   • Smokeless tobacco: Current User     Types: Chew     Last attempt to quit: 1/1/2016   Vaping Use   • Vaping Use: Never used   Substance Use Topics   • Alcohol use: Yes     Alcohol/week: 1.8 oz     Types: 3 Cans of beer per week     Comment: weekly   • Drug use: No       Patient Active Problem List    Diagnosis Date Noted   • COVID-19 10/11/2021   • Elevated liver enzymes 10/11/2021   • Allergic rhinitis due to pollen 09/13/2021   • Elevated blood pressure reading without diagnosis of hypertension 09/13/2021   • Hypertriglyceridemia 09/13/2021   • Mild intermittent asthma 09/13/2021   • Obesity 09/13/2021   • Prediabetes 09/13/2021   • Reactive airway disease 09/13/2021   • Tobacco user 09/13/2021   • Depression 09/13/2021   • CAITLYN (generalized anxiety disorder) 09/13/2021   • Weight gain 09/13/2021   • Cough 09/13/2021   • Snoring 09/13/2021   • Hand numbness 09/13/2021       Family History   Problem Relation Age of Onset   • No Known Problems Mother    • Suicide Attempts Father    • Diabetes Maternal Grandmother    • Dementia Maternal Grandfather    • Diabetes Paternal Grandmother    • Rheumatologic Disease Paternal Grandmother    • Heart Attack Paternal Grandfather           Objective:       /82 (BP Location: Right arm, Patient Position: Sitting, BP Cuff Size: Adult long)   Pulse 72   Temp 36.3 °C (97.4 °F) (Temporal)   Resp 16   Ht 1.803 m (5' 11\")   Wt 124 kg (274 lb)   SpO2 97%  Body mass index is 38.22 kg/m².    Physical Exam:  Constitutional: Well-developed and well-nourished male in NAD. Not diaphoretic. No distress.   Skin: warm, dry, intact, no evidence of rash or concerning lesions  Head: Atraumatic without lesions.  Eyes: Conjunctivae and extraocular motions are normal. Pupils are equal, round, and reactive to light. No scleral " icterus.   Ears:  External ears unremarkable.   Neck: Supple, trachea midline. No thyromegaly present. No cervical or supraclavicular lymphadenopathy.  Cardiovascular: Regular rate and rhythm without murmur. Radial pulses are intact and equal bilaterally.  Pulmonary: Clear to ausculation. Normal effort. No rales, ronchi, or wheezing.  Positive for dry cough.  Abdomen: Soft, non tender, and without distention. Active bowel sounds in all four quadrants. No rebound, guarding, masses   Extremities: No cyanosis, clubbing, erythema, nor edema.   Neurological: Alert and oriented x 3. No cranial nerve deficit. 5/5 myotomes. Sensation intact.   Psychiatric:  Behavior, mood, and affect are appropriate.    Chest x-ray two-view 10-     FINDINGS:  The heart is normal in size.  Interstitial and alveolar opacities persist bilaterally, however has significantly improved when compared to previous exam. Lung volumes remain relatively low. No pleural effusions are appreciated.        IMPRESSION:     Improvement in bilateral interstitial and alveolar opacities.    Assessment and Plan:     The following treatment plan was discussed:  I have reviewed all labs with patient and answered all questions.    1. COVID-19  This is a chronic ongoing condition.  Discussed with patient symptoms of long Covid and symptoms could continue for up to 4 months to 1 year.  Repeat x-ray was ordered to ensure pneumonia has cleared as patient is still symptomatic.  We will continue to monitor at future appointments.  Encouraged him to get Covid vaccinated.    Attempted to call patient to review x-ray results however had to leave a voice male.  [employer] message was sent.  Reviewed x-rays which indicated improvement of bilateral pneumonia.  Patient had recently finished azithromycin Dosepak and indicates he is feeling better.  [employer] message was sent to patient indicating if fever returns or he starts to feel worse in the next 5 days that we may need to  start him on a secondary antibiotic.  - DX-CHEST-2 VIEWS; Future    2. Prediabetes  This is a chronic and ongoing condition.  Discussed the importance of appropriate diet lifestyle modifications.  - VITAMIN D,25 HYDROXY; Future    3. Class 2 obesity due to excess calories with body mass index (BMI) of 38.0 to 38.9 in adult, unspecified whether serious comorbidity present  Discussed diet, exercise, weight loss, behavioral modification, portion size management.    4. Hypertriglyceridemia  - Lipid Profile; Future    5. Elevated liver enzymes  This is a new condition.  We will continue to monitor future appointments.  Encourage patient to decrease or cut out all alcohol.    6. Elevated blood pressure reading without diagnosis of hypertension  Well-controlled with diet lifestyle modifications.      7. Encounter for vitamin deficiency screening  - HEPATIC FUNCTION PANEL; Future      Any change or worsening of signs or symptoms, patient encouraged to follow-up or report to emergency room for further evaluation. Patient verbalizes understanding and agrees.    Follow-Up: Return in about 3 months (around 1/11/2022) for Follow up labs, Hepatic panel.      PLEASE NOTE: This dictation was created using voice recognition software. I have made every reasonable attempt to correct obvious errors, but I expect that there are errors of grammar and possibly content that I did not discover before finalizing the note.

## 2021-10-11 NOTE — ASSESSMENT & PLAN NOTE
This is a chronic condition.  Patient's BMI is 8.22 with a weight of 274 pounds.  Patient indicates that he is well aware of his weight gain and knows he needs to work on healthy diet lifestyle modifications to help with his obesity.   was given today about appropriate diet plan for pre by diabetes as well as exercising at least 150 minutes/week to help control his obesity.

## 2021-10-11 NOTE — ASSESSMENT & PLAN NOTE
This is a chronic condition.  Patient's A1c level is 6.  Discussed with patient the risks of prediabetes and appropriate diet and exercise to help control his A1c levels.  Discussed if A1c level gets between 6.3 and 6.4 possibly starting Metformin to help prevent secondary conditions associated with diabetes.  He denies any symptoms of hyper or hypoglycemic events.

## 2021-10-11 NOTE — ASSESSMENT & PLAN NOTE
Is a chronic condition current status unknown.  Patient is due for labs.  Patient is not currently taking any statin medications.  Labs ordered as indicated below.  Discussed the importance of healthy diet lifestyle modifications.

## 2021-10-11 NOTE — PATIENT INSTRUCTIONS
Preventing Type 2 Diabetes Mellitus  Type 2 diabetes (type 2 diabetes mellitus) is a long-term (chronic) disease that affects blood sugar (glucose) levels. Normally, a hormone called insulin allows glucose to enter cells in the body. The cells use glucose for energy. In type 2 diabetes, one or both of these problems may be present:  · The body does not make enough insulin.  · The body does not respond properly to insulin that it makes (insulin resistance).  Insulin resistance or lack of insulin causes excess glucose to build up in the blood instead of going into cells. As a result, high blood glucose (hyperglycemia) develops, which can cause many complications. Being overweight or obese and having an inactive (sedentary) lifestyle can increase your risk for diabetes. Type 2 diabetes can be delayed or prevented by making certain nutrition and lifestyle changes.  What nutrition changes can be made?    · Eat healthy meals and snacks regularly. Keep a healthy snack with you for when you get hungry between meals, such as fruit or a handful of nuts.  · Eat lean meats and proteins that are low in saturated fats, such as chicken, fish, egg whites, and beans. Avoid processed meats.  · Eat plenty of fruits and vegetables and plenty of grains that have not been processed (whole grains). It is recommended that you eat:  ? 1½?2 cups of fruit every day.  ? 2½?3 cups of vegetables every day.  ? 6?8 oz of whole grains every day, such as oats, whole wheat, bulgur, brown rice, quinoa, and millet.  · Eat low-fat dairy products, such as milk, yogurt, and cheese.  · Eat foods that contain healthy fats, such as nuts, avocado, olive oil, and canola oil.  · Drink water throughout the day. Avoid drinks that contain added sugar, such as soda or sweet tea.  · Follow instructions from your health care provider about specific eating or drinking restrictions.  · Control how much food you eat at a time (portion size).  ? Check food labels to find  out the serving sizes of foods.  ? Use a kitchen scale to weigh amounts of foods.  · Saute or steam food instead of frying it. Cook with water or broth instead of oils or butter.  · Limit your intake of:  ? Salt (sodium). Have no more than 1 tsp (2,400 mg) of sodium a day. If you have heart disease or high blood pressure, have less than ½?¾ tsp (1,500 mg) of sodium a day.  ? Saturated fat. This is fat that is solid at room temperature, such as butter or fat on meat.  What lifestyle changes can be made?  Activity    · Do moderate-intensity physical activity for at least 30 minutes on at least 5 days of the week, or as much as told by your health care provider.  · Ask your health care provider what activities are safe for you. A mix of physical activities may be best, such as walking, swimming, cycling, and strength training.  · Try to add physical activity into your day. For example:  ? Park in spots that are farther away than usual, so that you walk more. For example, park in a far corner of the parking lot when you go to the office or the grocery store.  ? Take a walk during your lunch break.  ? Use stairs instead of elevators or escalators.  Weight Loss  · Lose weight as directed. Your health care provider can determine how much weight loss is best for you and can help you lose weight safely.  · If you are overweight or obese, you may be instructed to lose at least 5?7 % of your body weight.  Alcohol and Tobacco    · Limit alcohol intake to no more than 1 drink a day for nonpregnant women and 2 drinks a day for men. One drink equals 12 oz of beer, 5 oz of wine, or 1½ oz of hard liquor.  · Do not use any tobacco products, such as cigarettes, chewing tobacco, and e-cigarettes. If you need help quitting, ask your health care provider.  Work With Your Health Care Provider  · Have your blood glucose tested regularly, as told by your health care provider.  · Discuss your risk factors and how you can reduce your risk for  diabetes.  · Get screening tests as told by your health care provider. You may have screening tests regularly, especially if you have certain risk factors for type 2 diabetes.  · Make an appointment with a diet and nutrition specialist (registered dietitian). A registered dietitian can help you make a healthy eating plan and can help you understand portion sizes and food labels.  Why are these changes important?  · It is possible to prevent or delay type 2 diabetes and related health problems by making lifestyle and nutrition changes.  · It can be difficult to recognize signs of type 2 diabetes. The best way to avoid possible damage to your body is to take actions to prevent the disease before you develop symptoms.  What can happen if changes are not made?  · Your blood glucose levels may keep increasing. Having high blood glucose for a long time is dangerous. Too much glucose in your blood can damage your blood vessels, heart, kidneys, nerves, and eyes.  · You may develop prediabetes or type 2 diabetes. Type 2 diabetes can lead to many chronic health problems and complications, such as:  ? Heart disease.  ? Stroke.  ? Blindness.  ? Kidney disease.  ? Depression.  ? Poor circulation in the feet and legs, which could lead to surgical removal (amputation) in severe cases.  Where to find support  · Ask your health care provider to recommend a registered dietitian, diabetes educator, or weight loss program.  · Look for local or online weight loss groups.  · Join a gym, fitness club, or outdoor activity group, such as a walking club.  Where to find more information  To learn more about diabetes and diabetes prevention, visit:  · American Diabetes Association (ADA): www.diabetes.org  · National [place] of Diabetes and Digestive and Kidney Diseases: www.niddk.nih.gov/health-information/diabetes  To learn more about healthy eating, visit:  · The U.S. Department of Agriculture (USDA), Choose My Plate:  www.choosemyplate.gov/food-groups  · Office of Disease Prevention and Health Promotion (ODPHP), Dietary Guidelines: www.health.gov/dietaryguidelines  Summary  · You can reduce your risk for type 2 diabetes by increasing your physical activity, eating healthy foods, and losing weight as directed.  · Talk with your health care provider about your risk for type 2 diabetes. Ask about any blood tests or screening tests that you need to have.  This information is not intended to replace advice given to you by your health care provider. Make sure you discuss any questions you have with your health care provider.  Document Released: 04/10/2017 Document Revised: 04/10/2020 Document Reviewed: 02/07/2017  [employer] Patient Education © 2020 [employer]  Prediabetes Eating Plan  Prediabetes is a condition that causes blood sugar (glucose) levels to be higher than normal. This increases the risk for developing diabetes. In order to prevent diabetes from developing, your health care provider may recommend a diet and other lifestyle changes to help you:  · Control your blood glucose levels.  · Improve your cholesterol levels.  · Manage your blood pressure.  Your health care provider may recommend working with a diet and nutrition specialist (dietitian) to make a meal plan that is best for you.  What are tips for following this plan?  Lifestyle  · Set weight loss goals with the help of your health care team. It is recommended that most people with prediabetes lose 7% of their current body weight.  · Exercise for at least 30 minutes at least 5 days a week.  · Attend a support group or seek ongoing support from a mental health counselor.  · Take over-the-counter and prescription medicines only as told by your health care provider.  Reading food labels  · Read food labels to check the amount of fat, salt (sodium), and sugar in prepackaged foods. Avoid foods that have:  ? Saturated fats.  ? Trans fats.  ? Added sugars.  · Avoid foods  that have more than 300 milligrams (mg) of sodium per serving. Limit your daily sodium intake to less than 2,300 mg each day.  Shopping  · Avoid buying pre-made and processed foods.  Cooking  · Cook with olive oil. Do not use butter, lard, or ghee.  · Bake, broil, grill, or boil foods. Avoid frying.  Meal planning    · Work with your dietitian to develop an eating plan that is right for you. This may include:  ? Tracking how many calories you take in. Use a food diary, notebook, or mobile application to track what you eat at each meal.  ? Using the glycemic index (GI) to plan your meals. The index tells you how quickly a food will raise your blood glucose. Choose low-GI foods. These foods take a longer time to raise blood glucose.  · Consider following a Mediterranean diet. This diet includes:  ? Several servings each day of fresh fruits and vegetables.  ? Eating fish at least twice a week.  ? Several servings each day of whole grains, beans, nuts, and seeds.  ? Using olive oil instead of other fats.  ? Moderate alcohol consumption.  ? Eating small amounts of red meat and whole-fat dairy.  · If you have high blood pressure, you may need to limit your sodium intake or follow a diet such as the DASH eating plan. DASH is an eating plan that aims to lower high blood pressure.  What foods are recommended?  The items listed below may not be a complete list. Talk with your dietitian about what dietary choices are best for you.  Grains  Whole grains, such as whole-wheat or whole-grain breads, crackers, cereals, and pasta. Unsweetened oatmeal. Bulgur. Barley. Quinoa. Brown rice. Corn or whole-wheat flour tortillas or taco shells.  Vegetables  Lettuce. Spinach. Peas. Beets. Cauliflower. Cabbage. Broccoli. Carrots. Tomatoes. Squash. Eggplant. Herbs. Peppers. Onions. Cucumbers. [place] sprouts.  Fruits  Berries. Bananas. Apples. Oranges. Grapes. Papaya. [place]. Pomegranate. Kiwi. Grapefruit. Cherries.  Meats and other protein  foods  Seafood. Poultry without skin. Lean cuts of pork and beef. Tofu. Eggs. Nuts. Beans.  Dairy  Low-fat or fat-free dairy products, such as yogurt, cottage cheese, and cheese.  Beverages  Water. Tea. Coffee. Sugar-free or diet soda. [place] water. Lowfat or no-fat milk. Milk alternatives, such as soy or almond milk.  Fats and oils  Olive oil. Canola oil. Sunflower oil. Grapeseed oil. Avocado. Walnuts.  Sweets and desserts  Sugar-free or low-fat pudding. Sugar-free or low-fat ice cream and other frozen treats.  Seasoning and other foods  Herbs. Sodium-free spices. Mustard. Relish. Low-fat, low-sugar ketchup. Low-fat, low-sugar barbecue sauce. Low-fat or fat-free mayonnaise.  What foods are not recommended?  The items listed below may not be a complete list. Talk with your dietitian about what dietary choices are best for you.  Grains  Refined white flour and flour products, such as bread, pasta, snack foods, and cereals.  Vegetables  Canned vegetables. Frozen vegetables with butter or cream sauce.  Fruits  Fruits canned with syrup.  Meats and other protein foods  Fatty cuts of meat. Poultry with skin. Breaded or fried meat. Processed meats.  Dairy  Full-fat yogurt, cheese, or milk.  Beverages  Sweetened drinks, such as sweet iced tea and soda.  Fats and oils  Butter. Lard. Ghee.  Sweets and desserts  Baked goods, such as cake, cupcakes, pastries, cookies, and cheesecake.  Seasoning and other foods  Spice mixes with added salt. Ketchup. Barbecue sauce. Mayonnaise.  Summary  · To prevent diabetes from developing, you may need to make diet and other lifestyle changes to help control blood sugar, improve cholesterol levels, and manage your blood pressure.  · Set weight loss goals with the help of your health care team. It is recommended that most people with prediabetes lose 7 percent of their current body weight.  · Consider following a Mediterranean diet that includes plenty of fresh fruits and vegetables, whole  grains, beans, nuts, seeds, fish, lean meat, low-fat dairy, and healthy oils.  This information is not intended to replace advice given to you by your health care provider. Make sure you discuss any questions you have with your health care provider.  Document Released: 05/03/2016 Document Revised: 04/10/2020 Document Reviewed: 02/21/2018  Elsevier Patient Education © 2020 [employer]

## 2021-10-13 ENCOUNTER — APPOINTMENT (OUTPATIENT)
Dept: RADIOLOGY | Facility: IMAGING CENTER | Age: 46
End: 2021-10-13
Attending: PHYSICIAN ASSISTANT
Payer: COMMERCIAL

## 2021-10-13 ENCOUNTER — OFFICE VISIT (OUTPATIENT)
Dept: MEDICAL GROUP | Facility: PHYSICIAN GROUP | Age: 46
End: 2021-10-13
Payer: COMMERCIAL

## 2021-10-13 VITALS
DIASTOLIC BLOOD PRESSURE: 80 MMHG | WEIGHT: 276.4 LBS | TEMPERATURE: 99.4 F | BODY MASS INDEX: 38.69 KG/M2 | SYSTOLIC BLOOD PRESSURE: 128 MMHG | HEART RATE: 98 BPM | OXYGEN SATURATION: 95 % | HEIGHT: 71 IN

## 2021-10-13 DIAGNOSIS — U07.1 COVID-19: ICD-10-CM

## 2021-10-13 DIAGNOSIS — R06.02 SOB (SHORTNESS OF BREATH): ICD-10-CM

## 2021-10-13 DIAGNOSIS — Z02.89 ENCOUNTER FOR COMPLETION OF FORM WITH PATIENT: ICD-10-CM

## 2021-10-13 PROCEDURE — 99214 OFFICE O/P EST MOD 30 MIN: CPT | Mod: CS | Performed by: NURSE PRACTITIONER

## 2021-10-13 RX ORDER — DOXYCYCLINE HYCLATE 100 MG
100 TABLET ORAL 2 TIMES DAILY
Qty: 14 TABLET | Refills: 0 | Status: SHIPPED | OUTPATIENT
Start: 2021-10-13 | End: 2021-10-20

## 2021-10-13 NOTE — ASSESSMENT & PLAN NOTE
This is an ongoing condition.  Reviewed chest x-ray with patient indicating that his pneumonia has been improving.  Patient continues to report a dry cough.  Today he also states that he has mild off-and-on chills.  Discussed with patient that if he continues to have off-and-on chills or starts to develop a fever in the next 3 to 5 days to start taking the prescription of doxycycline that I have sent to the pharmacy.  Patient was agreeable to this plan of care and will wait 3 to 5 days prior to starting doxycycline.    [place] paperwork was filled out during the exam today as well as a work note indicating that he will be out of work until his symptoms and improve and is reevaluated in clinic.

## 2021-10-13 NOTE — LETTER
[address] [address]     October 13, 2021    Patient: Hakan Murrieta   YOB: 1975   Date of Visit: 10/13/2021       To Whom It May Concern:    Hakan Murrieta was seen and treated in our department on 10/13/2021.  Patient has not cleared to return to work and needs to be reevaluated prior to return to work date can be set.    Sincerely,         LUIS ANTONIO Kaur.

## 2021-10-13 NOTE — PROGRESS NOTES
Chief Complaint   Patient presents with   • Follow-Up     Disability form       Subjective:     HPI:   Hakan Murrieta is a 45 y.o. male here to discuss the evaluation and management of:      COVID-19  This is an ongoing condition.  Reviewed chest x-ray with patient indicating that his pneumonia has been improving.  Patient continues to report a dry cough.  Today he also states that he has mild off-and-on chills.  Discussed with patient that if he continues to have off-and-on chills or starts to develop a fever in the next 3 to 5 days to start taking the prescription of doxycycline that I have sent to the pharmacy.  Patient was agreeable to this plan of care and will wait 3 to 5 days prior to starting doxycycline.    [place] paperwork was filled out during the exam today as well as a work note indicating that he will be out of work until his symptoms and improve and is reevaluated in clinic.          ROS:  Gen: Positive per HPI  Eyes: no changes in vision  ENT: no sore throat, no hearing loss, no bloody nose  Pulm: Positive per HPI  CV: Positive per HPI  GI: no nausea/vomiting, no diarrhea  : no dysuria  MSk: no myalgias  Skin: no rash  Neuro: Positive per HPI  Heme/Lymph: no easy bruising    No Known Allergies    Current medicines (including changes today)  Current Outpatient Medications   Medication Sig Dispense Refill   • doxycycline (VIBRAMYCIN) 100 MG Tab Take 1 Tablet by mouth 2 times a day for 7 days. 14 Tablet 0   • albuterol 108 (90 Base) MCG/ACT Aero Soln inhalation aerosol Inhale 2 Puffs every 6 hours as needed for Shortness of Breath. 8.5 g 0   • benzonatate (TESSALON) 100 MG Cap Take 1 Capsule by mouth 3 times a day as needed for Cough. 60 Capsule 0     No current facility-administered medications for this visit.       Social History     Tobacco Use   • Smoking status: Former Smoker     Packs/day: 0.25     Years: 10.00     Pack years: 2.50     Types: Cigars   • Smokeless tobacco: Current User      "Types: Chew     Last attempt to quit: 1/1/2016   Vaping Use   • Vaping Use: Never used   Substance Use Topics   • Alcohol use: Yes     Alcohol/week: 1.8 oz     Types: 3 Cans of beer per week     Comment: weekly   • Drug use: No       Patient Active Problem List    Diagnosis Date Noted   • COVID-19 10/11/2021   • Elevated liver enzymes 10/11/2021   • Allergic rhinitis due to pollen 09/13/2021   • Elevated blood pressure reading without diagnosis of hypertension 09/13/2021   • Hypertriglyceridemia 09/13/2021   • Mild intermittent asthma 09/13/2021   • Obesity 09/13/2021   • Prediabetes 09/13/2021   • Reactive airway disease 09/13/2021   • Tobacco user 09/13/2021   • Depression 09/13/2021   • CAITLYN (generalized anxiety disorder) 09/13/2021   • Weight gain 09/13/2021   • Cough 09/13/2021   • Snoring 09/13/2021   • Hand numbness 09/13/2021       Family History   Problem Relation Age of Onset   • No Known Problems Mother    • Suicide Attempts Father    • Diabetes Maternal Grandmother    • Dementia Maternal Grandfather    • Diabetes Paternal Grandmother    • Rheumatologic Disease Paternal Grandmother    • Heart Attack Paternal Grandfather           Objective:     Hospital Outpatient Visit on 09/21/2021   Component Date Value Ref Range Status   • COVID Order Status 09/21/2021 Received   Final    Comment: The order for SARS CoV-2 testing has been received by the  Laboratory. This result is neither positive nor negative.  Final results of testing will report separately.     • SARS-CoV-2 Source 09/21/2021 Nasal Swab   Final   • SARS-CoV-2 by PCR 09/21/2021 DETECTED*  Final    Comment: PATIENTS: Important information regarding your results and instructions can  be found at [email or web address]   \"After your  Covid-19 Test\"    **The Roche SARS-CoV-2 RT-PCR test has been made available for use under the  Emergency Use Authorization (EUA) only.     Hospital Outpatient Visit on 09/14/2021   Component Date " "Value Ref Range Status   • Vitamin B12 -True Cobalamin 09/14/2021 757  211 - 911 pg/mL Final   • TSH 09/14/2021 1.800  0.380 - 5.330 uIU/mL Final    Comment: Reference Range:    Pregnant Females, 1st Trimester  0.050-3.700  Pregnant Females, 2nd Trimester  0.310-4.350  Pregnant Females, 3rd Trimester  0.410-5.180     • Glycohemoglobin 09/14/2021 6.0* 4.0 - 5.6 % Final    Comment: Increased risk for diabetes:  5.7 -6.4%  Diabetes:  >6.4%  Glycemic control for adults with diabetes:  <7.0%    The above interpretations are per ADA guidelines.  Diagnosis  of diabetes mellitus on the basis of elevated Hemoglobin A1c  should be confirmed by repeating the Hb A1c test.     • Est Avg Glucose 09/14/2021 126  mg/dL Final    Comment: The eAG calculation is based on the A1c-Derived Daily Glucose  (ADAG) study.  See the ADA's website for additional information.     • Sodium 09/14/2021 141  135 - 145 mmol/L Final   • Potassium 09/14/2021 4.4  3.6 - 5.5 mmol/L Final   • Chloride 09/14/2021 107  96 - 112 mmol/L Final   • Co2 09/14/2021 20  20 - 33 mmol/L Final   • Anion Gap 09/14/2021 14.0  7.0 - 16.0 Final   • Glucose 09/14/2021 112* 65 - 99 mg/dL Final   • Bun 09/14/2021 15  8 - 22 mg/dL Final   • Creatinine 09/14/2021 0.78  0.50 - 1.40 mg/dL Final   • Calcium 09/14/2021 9.2  8.5 - 10.5 mg/dL Final   • AST(SGOT) 09/14/2021 48* 12 - 45 U/L Final   • ALT(SGPT) 09/14/2021 74* 2 - 50 U/L Final   • Alkaline Phosphatase 09/14/2021 67  30 - 99 U/L Final   • Total Bilirubin 09/14/2021 0.7  0.1 - 1.5 mg/dL Final   • Albumin 09/14/2021 4.2  3.2 - 4.9 g/dL Final   • Total Protein 09/14/2021 6.8  6.0 - 8.2 g/dL Final   • Globulin 09/14/2021 2.6  1.9 - 3.5 g/dL Final   • A-G Ratio 09/14/2021 1.6  g/dL Final   • GFR If  09/14/2021 >60  >60 mL/min/1.73 m 2 Final   • GFR If Non  09/14/2021 >60  >60 mL/min/1.73 m 2 Final       /80   Pulse 98   Temp 37.4 °C (99.4 °F)   Ht 1.803 m (5' 11\")   Wt (!) 125 kg " (276 lb 6.4 oz)   SpO2 95%  Body mass index is 38.55 kg/m².    Physical Exam:  Constitutional: Well-developed and well-nourished male in NAD. Not diaphoretic. No distress.   Skin: warm, dry, intact, no evidence of rash or concerning lesions  Head: Atraumatic without lesions.  Eyes: Conjunctivae and extraocular motions are normal. Pupils are equal, round, and reactive to light. No scleral icterus.   Ears:  External ears unremarkable. TMs normal; bilaterally  Mouth/Throat: Tongue normal. Oropharynx is clear and moist. Posterior pharynx without erythema or exudates.  Neck: Supple, trachea midline. No thyromegaly present. No cervical or supraclavicular lymphadenopathy.  Cardiovascular: Regular rate and rhythm without murmur. Radial pulses are intact and equal bilaterally.  Pulmonary: Clear to ausculation. Normal effort. No rales, ronchi, or wheezing.  Abdomen: Soft, non tender, and without distention. Active bowel sounds in all four quadrants. No rebound, guarding, masses   Extremities: No cyanosis, clubbing, erythema, nor edema.   Neurological: Alert and oriented x 3. No cranial nerve deficit. 5/5 myotomes. Sensation intact.   Psychiatric:  Behavior, mood, and affect are appropriate.    Assessment and Plan:     The following treatment plan was discussed:      1. COVID-19  This is an ongoing condition.  Discussed with patient the importance of staying well-hydrated and proper nutrition.  Patient's recent x-ray showed improving pneumonia.  Doxycycline was called in if symptoms continue to worsen or do not improve in the next 3 to 5 days.  Urgent care/ER precautions were given.  - doxycycline (VIBRAMYCIN) 100 MG Tab; Take 1 Tablet by mouth 2 times a day for 7 days.  Dispense: 14 Tablet; Refill: 0    2. Encounter for completion of form with patient  [facility] paperwork filled out in clinic today with patient and scanned into epic.    Any change or worsening of signs or symptoms, patient encouraged to follow-up or report to  emergency room for further evaluation. Patient verbalizes understanding and agrees.    Follow-Up: Return in about 15 days (around 10/28/2021).      PLEASE NOTE: This dictation was created using voice recognition software. I have made every reasonable attempt to correct obvious errors, but I expect that there are errors of grammar and possibly content that I did not discover before finalizing the note.

## 2021-10-20 ENCOUNTER — NON-PROVIDER VISIT (OUTPATIENT)
Dept: URGENT CARE | Facility: PHYSICIAN GROUP | Age: 46
End: 2021-10-20
Payer: COMMERCIAL

## 2021-10-20 ENCOUNTER — APPOINTMENT (OUTPATIENT)
Dept: RADIOLOGY | Facility: IMAGING CENTER | Age: 46
End: 2021-10-20
Attending: PHYSICIAN ASSISTANT
Payer: COMMERCIAL

## 2021-10-20 DIAGNOSIS — R06.02 SOB (SHORTNESS OF BREATH): ICD-10-CM

## 2021-10-20 DIAGNOSIS — R06.2 WHEEZING: ICD-10-CM

## 2021-10-20 DIAGNOSIS — U07.1 COVID: ICD-10-CM

## 2021-10-20 PROCEDURE — 71046 X-RAY EXAM CHEST 2 VIEWS: CPT | Mod: TC,FY | Performed by: PHYSICIAN ASSISTANT

## 2021-10-20 RX ORDER — LEVOFLOXACIN 500 MG/1
500 TABLET, FILM COATED ORAL DAILY
Qty: 5 TABLET | Refills: 0 | Status: SHIPPED | OUTPATIENT
Start: 2021-10-20 | End: 2021-10-25

## 2021-10-20 RX ORDER — CODEINE PHOSPHATE AND GUAIFENESIN 10; 100 MG/5ML; MG/5ML
5 SOLUTION ORAL EVERY 12 HOURS PRN
Qty: 100 ML | Refills: 0 | Status: SHIPPED | OUTPATIENT
Start: 2021-10-20 | End: 2021-10-28

## 2021-10-20 RX ORDER — PREDNISONE 10 MG/1
TABLET ORAL
Qty: 15 TABLET | Refills: 0 | Status: SHIPPED | OUTPATIENT
Start: 2021-10-20 | End: 2021-10-28

## 2021-10-20 RX ORDER — ALBUTEROL SULFATE 2.5 MG/3ML
2.5 SOLUTION RESPIRATORY (INHALATION) EVERY 4 HOURS PRN
Qty: 30 EACH | Refills: 1 | Status: SHIPPED | OUTPATIENT
Start: 2021-10-20 | End: 2021-12-07 | Stop reason: SDUPTHER

## 2021-10-20 NOTE — PROGRESS NOTES
Diagnosed with COVID 9/21/21 9/23 no PNA on xray  9/24 monoclonal antibodies  9/26 dexamethaxone  9/27 COVID PNA  9/27 azithromycin  10/13 doxycycline    94% on RA  Diffuse inspiratory and expiratory wheezing    Assessment and plan  Patient does not feel he is getting any better.  Starting steroids, nebulizer medications, cough suppressant, and contingent levofloxacin.  Patient was provided a nebulizer machine to help decrease shortness of breath and wheezing.  Patient understands to follow-up for any worsening or persistent symptoms.

## 2021-10-28 ENCOUNTER — OFFICE VISIT (OUTPATIENT)
Dept: MEDICAL GROUP | Facility: PHYSICIAN GROUP | Age: 46
End: 2021-10-28
Payer: COMMERCIAL

## 2021-10-28 ENCOUNTER — APPOINTMENT (OUTPATIENT)
Dept: RADIOLOGY | Facility: IMAGING CENTER | Age: 46
End: 2021-10-28
Attending: NURSE PRACTITIONER
Payer: COMMERCIAL

## 2021-10-28 VITALS
HEIGHT: 71 IN | WEIGHT: 276.4 LBS | RESPIRATION RATE: 18 BRPM | DIASTOLIC BLOOD PRESSURE: 90 MMHG | OXYGEN SATURATION: 100 % | SYSTOLIC BLOOD PRESSURE: 120 MMHG | TEMPERATURE: 98.3 F | HEART RATE: 96 BPM | BODY MASS INDEX: 38.69 KG/M2

## 2021-10-28 DIAGNOSIS — U07.1 COVID-19: ICD-10-CM

## 2021-10-28 DIAGNOSIS — R05.9 COUGH: ICD-10-CM

## 2021-10-28 PROCEDURE — 99214 OFFICE O/P EST MOD 30 MIN: CPT | Mod: CS | Performed by: NURSE PRACTITIONER

## 2021-10-28 PROCEDURE — 71046 X-RAY EXAM CHEST 2 VIEWS: CPT | Mod: TC,FY,CS | Performed by: NURSE PRACTITIONER

## 2021-10-28 ASSESSMENT — PATIENT HEALTH QUESTIONNAIRE - PHQ9: CLINICAL INTERPRETATION OF PHQ2 SCORE: 0

## 2021-10-28 NOTE — LETTER
October 28, 2021    To Whom It May Concern:         This is confirmation that Hakan Murrieta attended his scheduled appointment with MARNI Carlos on 10/28/21. Pt continues to have lingering symptoms associated with recent illness, he can tentatively return to work on 11/08/21         If you have any questions please do not hesitate to call me at the phone number listed below.    Sincerely,          LUIS ANTONIO Carlos.  [phone]

## 2021-10-28 NOTE — PATIENT INSTRUCTIONS
Chest x-ray today    Note for work    Continue to use inhalers consistently every day as prescribed    Start taking mucinex either in a cough syrup or oral pills    Stay adequately hydrated, at least 64 ounce of water per day

## 2021-10-28 NOTE — ASSESSMENT & PLAN NOTE
45-year-old female patient presents today to discuss lingering symptoms of recent Covid infection  He did contract Covid in mid-September and subsequently developed Covid pneumonia  He has taken at least a couple of rounds of antibiotics, steroids, over-the-counter cough and cold medications  His last chest x-ray was done on October 20, this showed complete resolution of Covid pneumonia  However he is not convinced this is the case and would like another chest x-ray as he does continue to cough and have occasional wheezing  Upon physical examination he was noted to have a few scattered wheezes to the posterior mid lobes as well as lower lobes  Discussed medications that he has been on including albuterol inhaler as well as Atrovent, it is not sound like he is take them consistently as at 1 point he was feeling better when he stopped his medication his symptoms returned  He is not coughing anything significant up nor is it discolored  Vital signs stable today, he is afebrile  Discussed the importance of continuing to use the inhalers consistently as prescribed, he was also advised to use Mucinex daily to help break up chest congestion, advised to take this with much water he does request an note to extend his leave from work, this is reasonable, note provided    He will be given results of his chest x-ray and any further actions if needed upon review

## 2021-10-28 NOTE — PROGRESS NOTES
Chief Complaint   Patient presents with   • Cough     HAD COVID IN SEPTEMBER, TURNED INTO PNUEMONIA       HISTORY OF PRESENT ILLNESS: Patient is a 45 y.o. male established patient who presents today to discuss concerns for continued s/sx of recent COVID infection    COVID-19  45-year-old female patient presents today to discuss lingering symptoms of recent Covid infection  He did contract Covid in mid-September and subsequently developed Covid pneumonia  He has taken at least a couple of rounds of antibiotics, steroids, over-the-counter cough and cold medications  His last chest x-ray was done on October 20, this showed complete resolution of Covid pneumonia  However he is not convinced this is the case and would like another chest x-ray as he does continue to cough and have occasional wheezing  Upon physical examination he was noted to have a few scattered wheezes to the posterior mid lobes as well as lower lobes  Discussed medications that he has been on including albuterol inhaler as well as Atrovent, it is not sound like he is take them consistently as at 1 point he was feeling better when he stopped his medication his symptoms returned  He is not coughing anything significant up nor is it discolored  Vital signs stable today, he is afebrile  Discussed the importance of continuing to use the inhalers consistently as prescribed, he was also advised to use Mucinex daily to help break up chest congestion, advised to take this with much water he does request an note to extend his leave from work, this is reasonable, note provided    He will be given results of his chest x-ray and any further actions if needed upon review        Patient Active Problem List    Diagnosis Date Noted   • COVID-19 10/11/2021   • Elevated liver enzymes 10/11/2021   • Allergic rhinitis due to pollen 09/13/2021   • Elevated blood pressure reading without diagnosis of hypertension 09/13/2021   • Hypertriglyceridemia 09/13/2021   • Mild  intermittent asthma 2021   • Obesity 2021   • Prediabetes 2021   • Reactive airway disease 2021   • Tobacco user 2021   • Depression 2021   • CAITLYN (generalized anxiety disorder) 2021   • Weight gain 2021   • Cough 2021   • Snoring 2021   • Hand numbness 2021       Allergies:Patient has no known allergies.    Current Outpatient Medications   Medication Sig Dispense Refill   • albuterol (PROVENTIL) 2.5mg/3ml Nebu Soln solution for nebulization Take 3 mL by nebulization every four hours as needed for Shortness of Breath. 30 Each 1   • ipratropium (ATROVENT) 0.02 % Solution Use one vial via nebulizer every 4 hours as needed for shortness of breath 30 Each 1   • albuterol 108 (90 Base) MCG/ACT Aero Soln inhalation aerosol Inhale 2 Puffs every 6 hours as needed for Shortness of Breath. 8.5 g 0     No current facility-administered medications for this visit.       Social History     Tobacco Use   • Smoking status: Former Smoker     Packs/day: 0.25     Years: 10.00     Pack years: 2.50     Types: Cigars   • Smokeless tobacco: Current User     Types: Chew     Last attempt to quit: 2016   Vaping Use   • Vaping Use: Never used   Substance Use Topics   • Alcohol use: Yes     Alcohol/week: 1.8 oz     Types: 3 Cans of beer per week     Comment: weekly   • Drug use: No       Family Status   Relation Name Status   • Mo  Alive   • Fa     • MGMo     • MGFa     • PGMo     • PGFa       Family History   Problem Relation Age of Onset   • No Known Problems Mother    • Suicide Attempts Father    • Diabetes Maternal Grandmother    • Dementia Maternal Grandfather    • Diabetes Paternal Grandmother    • Rheumatologic Disease Paternal Grandmother    • Heart Attack Paternal Grandfather        Review of Systems:   Constitutional: Negative for fever, chills, weight loss and malaise/fatigue.     Respiratory: positive per  "HPI  Cardiovascular: Negative for chest pain, palpitations, orthopnea and leg swelling.   Gastrointestinal: Negative for heartburn, nausea, vomiting and abdominal pain.   Genitourinary/Renal: Negative for dysuria, urgency and frequency.   Musculoskeletal: Negative for myalgias, back pain and joint pain.   Skin: Negative for rash and itching.   Neurological: Negative for dizziness, tingling, tremors, sensory change, focal weakness and headaches.       Exam:  /90   Pulse 96   Temp 36.8 °C (98.3 °F) (Temporal)   Resp 18   Ht 1.803 m (5' 11\")   Wt (!) 125 kg (276 lb 6.4 oz)   SpO2 100%   General:  Well nourished, well developed male in NAD  Skin: warm, dry, intact, no evidence of rash or concerning lesions  Head: is grossly normal.  HEENT: eyes clear, conjunctiva normal, PERRLA  Pulmonary: positive for few scattered wheezes to bilateral mid lobes and lower lobes  Cardiovascular: Regular rate and rhythm without murmur. Carotid and radial pulses are intact and equal bilaterally.  Abdomen: soft, non-tender, positive bowel sounds  Musculoskeletal: no clubbing, cyanosis, or edema.  Psych/mental: no depression, anxiety, hallucinations  Neuro: alert, intact, CN 2-12 grossly intact      Assessment/Plan:      1. COVID-19  We will get repeat chest x-ray patient to be notified of results and further actions if needed  ER precautions discussed  Advised to use inhalers consistently as prescribed, including Mucinex and regimen  Note for work extended through November 8    - DX-CHEST-2 VIEWS; Future    2. Cough    - DX-CHEST-2 VIEWS; Future         Return if symptoms worsen or fail to improve, for Follow-up.        Please note that this dictation was created using voice recognition software. I have made every reasonable attempt to correct obvious errors, but I expect that there are errors of grammar and possibly content that I did not discover before finalizing the note.    "

## 2021-11-03 ENCOUNTER — OFFICE VISIT (OUTPATIENT)
Dept: URGENT CARE | Facility: PHYSICIAN GROUP | Age: 46
End: 2021-11-03
Payer: COMMERCIAL

## 2021-11-03 VITALS
HEIGHT: 71 IN | BODY MASS INDEX: 38.64 KG/M2 | WEIGHT: 276 LBS | RESPIRATION RATE: 16 BRPM | HEART RATE: 94 BPM | TEMPERATURE: 97.1 F | OXYGEN SATURATION: 95 % | DIASTOLIC BLOOD PRESSURE: 94 MMHG | SYSTOLIC BLOOD PRESSURE: 140 MMHG

## 2021-11-03 DIAGNOSIS — R05.3 CHRONIC COUGH: ICD-10-CM

## 2021-11-03 DIAGNOSIS — U07.1 COVID: ICD-10-CM

## 2021-11-03 DIAGNOSIS — R06.02 SOB (SHORTNESS OF BREATH): ICD-10-CM

## 2021-11-03 PROCEDURE — 99214 OFFICE O/P EST MOD 30 MIN: CPT | Mod: CS | Performed by: PHYSICIAN ASSISTANT

## 2021-11-03 RX ORDER — FLUTICASONE PROPIONATE AND SALMETEROL XINAFOATE 45; 21 UG/1; UG/1
1 AEROSOL, METERED RESPIRATORY (INHALATION) 2 TIMES DAILY
Qty: 12 G | Refills: 5 | Status: SHIPPED | OUTPATIENT
Start: 2021-11-03 | End: 2022-01-11

## 2021-11-03 RX ORDER — CODEINE PHOSPHATE AND GUAIFENESIN 10; 100 MG/5ML; MG/5ML
5 SOLUTION ORAL EVERY 12 HOURS PRN
Qty: 100 ML | Refills: 0 | Status: SHIPPED | OUTPATIENT
Start: 2021-11-03 | End: 2021-11-13

## 2021-11-03 NOTE — LETTER
November 3, 2021         Patient: Hakan Murrieta   YOB: 1975   Date of Visit: 11/3/2021           To Whom it May Concern:    Hakan Murrieta was seen in my clinic on 11/3/2021. He may return to work 11/22/21.    If you have any questions or concerns, please don't hesitate to call.        Sincerely,           Rain Romeo P.A.-C.  Electronically Signed

## 2021-11-03 NOTE — PROGRESS NOTES
Chief Complaint   Patient presents with   • Follow-Up     Covid Pneumonia       HISTORY OF PRESENT ILLNESS: Patient is a 45 y.o. male who presents today for the following:    Patient has persistent cough and shortness of breath.  He has had continued intermittent hot flashes and chills here and there.  He reports a history of intermittently chronic cough even without illness but is worse with illness and post illness.  He reports extreme fatigue and low exertional tolerance.  He does not feel like he is going to be able to go back to work anytime soon.    Recent history:  Diagnosed with COVID 9/21/21 9/23 no PNA on xray  9/24 monoclonal antibodies  9/26 dexamethaxone  9/27 COVID PNA  9/27 azithromycin  10/13 doxycycline    Second hand smoke entire childhood  Wife smokes  22 years with [employer]  H/o breathing/coughing issue with illness/post illness, even without illness    Patient Active Problem List    Diagnosis Date Noted   • COVID-19 10/11/2021   • Elevated liver enzymes 10/11/2021   • Allergic rhinitis due to pollen 09/13/2021   • Elevated blood pressure reading without diagnosis of hypertension 09/13/2021   • Hypertriglyceridemia 09/13/2021   • Mild intermittent asthma 09/13/2021   • Obesity 09/13/2021   • Prediabetes 09/13/2021   • Reactive airway disease 09/13/2021   • Tobacco user 09/13/2021   • Depression 09/13/2021   • CAITLYN (generalized anxiety disorder) 09/13/2021   • Weight gain 09/13/2021   • Cough 09/13/2021   • Snoring 09/13/2021   • Hand numbness 09/13/2021       Allergies:Patient has no known allergies.    Current Outpatient Medications Ordered in Epic   Medication Sig Dispense Refill   • fluticasone-salmeterol (ADVAIR HFA) 45-21 MCG/ACT inhaler Inhale 1 Puff 2 times a day. 12 g 5   • guaifenesin-codeine (ROBITUSSIN AC) Solution oral solution Take 5 mL by mouth every 12 hours as needed for Cough for up to 10 days. 100 mL 0   • albuterol (PROVENTIL) 2.5mg/3ml Nebu Soln solution for nebulization  "Take 3 mL by nebulization every four hours as needed for Shortness of Breath. 30 Each 1   • ipratropium (ATROVENT) 0.02 % Solution Use one vial via nebulizer every 4 hours as needed for shortness of breath 30 Each 1   • albuterol 108 (90 Base) MCG/ACT Aero Soln inhalation aerosol Inhale 2 Puffs every 6 hours as needed for Shortness of Breath. 8.5 g 0     No current Epic-ordered facility-administered medications on file.       Past Medical History:   Diagnosis Date   • Asthma    • Depression 2021   • CAITLYN (generalized anxiety disorder) 2021   • GERD (gastroesophageal reflux disease)    • Hyperlipidemia    • Migraine        Social History     Tobacco Use   • Smoking status: Former Smoker     Packs/day: 0.25     Years: 10.00     Pack years: 2.50     Types: Cigars   • Smokeless tobacco: Current User     Types: Chew     Last attempt to quit: 2016   Vaping Use   • Vaping Use: Never used   Substance Use Topics   • Alcohol use: Yes     Alcohol/week: 1.8 oz     Types: 3 Cans of beer per week     Comment: weekly   • Drug use: No       Family Status   Relation Name Status   • Mo  Alive   • Fa     • MGMo     • MGFa     • PGMo     • PGFa       Family History   Problem Relation Age of Onset   • No Known Problems Mother    • Suicide Attempts Father    • Diabetes Maternal Grandmother    • Dementia Maternal Grandfather    • Diabetes Paternal Grandmother    • Rheumatologic Disease Paternal Grandmother    • Heart Attack Paternal Grandfather        Review of Systems:   Pertinent systems reviewed with the patient.      Exam:  /94   Pulse 94   Temp 36.2 °C (97.1 °F) (Temporal)   Resp 16   Ht 1.803 m (5' 11\")   Wt (!) 125 kg (276 lb)   SpO2 95%   General: Well developed, well nourished. No distress.    Eye: PERRL/EOMI; conjunctivae clear, lids normal.  ENMT:  Bilateral TMs are within normal limits.  Pulmonary: Unlabored respiratory effort. Lungs clear to auscultation, no " wheezes, no rhonchi.    Cardiovascular: Regular rate and rhythm without murmur.   Neurologic: Grossly nonfocal. No facial asymmetry noted.  Skin: Warm, dry, good turgor. No rashes in visible areas.   Psych: Normal mood. Alert and oriented to person, place and time.    Assessment/Plan:  Referring patient to pulmonology.  Trial of Advair to see if this helps alleviate some of the chronic cough.  Discussed importance of rinsing his mouth out after using the inhaler.  Follow-up with pulmonology.  Return to the urgent care for any worsening symptoms.  1. COVID  guaifenesin-codeine (ROBITUSSIN AC) Solution oral solution    Covid pneumonia has resolved but has persistent cough, shortness of breath, exertional fatigue.   2. Chronic cough  Referral to Pulmonary and Sleep Medicine    fluticasone-salmeterol (ADVAIR HFA) 45-21 MCG/ACT inhaler    Previous to Covid diagnosis, for years.   3. SOB (shortness of breath)

## 2021-11-08 ENCOUNTER — PATIENT MESSAGE (OUTPATIENT)
Dept: URGENT CARE | Facility: PHYSICIAN GROUP | Age: 46
End: 2021-11-08

## 2021-11-15 ENCOUNTER — TELEPHONE (OUTPATIENT)
Dept: URGENT CARE | Facility: PHYSICIAN GROUP | Age: 46
End: 2021-11-15

## 2021-12-01 ENCOUNTER — OFFICE VISIT (OUTPATIENT)
Dept: URGENT CARE | Facility: PHYSICIAN GROUP | Age: 46
End: 2021-12-01
Payer: COMMERCIAL

## 2021-12-01 VITALS
WEIGHT: 275 LBS | RESPIRATION RATE: 16 BRPM | TEMPERATURE: 96.6 F | DIASTOLIC BLOOD PRESSURE: 88 MMHG | BODY MASS INDEX: 38.5 KG/M2 | OXYGEN SATURATION: 96 % | HEART RATE: 76 BPM | HEIGHT: 71 IN | SYSTOLIC BLOOD PRESSURE: 130 MMHG

## 2021-12-01 DIAGNOSIS — R05.3 CHRONIC COUGH: ICD-10-CM

## 2021-12-01 DIAGNOSIS — R53.83 FATIGUE, UNSPECIFIED TYPE: ICD-10-CM

## 2021-12-01 DIAGNOSIS — R06.02 SOB (SHORTNESS OF BREATH): ICD-10-CM

## 2021-12-01 PROCEDURE — 99214 OFFICE O/P EST MOD 30 MIN: CPT | Performed by: PHYSICIAN ASSISTANT

## 2021-12-01 RX ORDER — FLUTICASONE PROPIONATE AND SALMETEROL XINAFOATE 115; 21 UG/1; UG/1
1 AEROSOL, METERED RESPIRATORY (INHALATION) 2 TIMES DAILY
Qty: 12 G | Refills: 11 | Status: SHIPPED | OUTPATIENT
Start: 2021-12-01 | End: 2022-12-13

## 2021-12-01 NOTE — PROGRESS NOTES
Chief Complaint   Patient presents with   • Shortness of Breath     fv from 11/03/2021       HISTORY OF PRESENT ILLNESS: Patient is a 46 y.o. male who presents today for the following:    COVID PNA 2+ months ago  Was put on low-dose Advair  Still needing albuterol 3-4+ times per week  Still having chronic cough, shortness of breath, and extreme fatigue  Does not feel like he can go back to work    Patient Active Problem List    Diagnosis Date Noted   • COVID-19 10/11/2021   • Elevated liver enzymes 10/11/2021   • Allergic rhinitis due to pollen 09/13/2021   • Elevated blood pressure reading without diagnosis of hypertension 09/13/2021   • Hypertriglyceridemia 09/13/2021   • Mild intermittent asthma 09/13/2021   • Obesity 09/13/2021   • Prediabetes 09/13/2021   • Reactive airway disease 09/13/2021   • Tobacco user 09/13/2021   • Depression 09/13/2021   • CAITLYN (generalized anxiety disorder) 09/13/2021   • Weight gain 09/13/2021   • Cough 09/13/2021   • Snoring 09/13/2021   • Hand numbness 09/13/2021       Allergies:Patient has no known allergies.    Current Outpatient Medications Ordered in Epic   Medication Sig Dispense Refill   • fluticasone-salmeterol (ADVAIR HFA) 115-21 MCG/ACT inhaler Inhale 1 Puff 2 times a day. 12 g 11   • fluticasone-salmeterol (ADVAIR HFA) 45-21 MCG/ACT inhaler Inhale 1 Puff 2 times a day. 12 g 5   • albuterol (PROVENTIL) 2.5mg/3ml Nebu Soln solution for nebulization Take 3 mL by nebulization every four hours as needed for Shortness of Breath. 30 Each 1   • ipratropium (ATROVENT) 0.02 % Solution Use one vial via nebulizer every 4 hours as needed for shortness of breath 30 Each 1   • albuterol 108 (90 Base) MCG/ACT Aero Soln inhalation aerosol Inhale 2 Puffs every 6 hours as needed for Shortness of Breath. 8.5 g 0     No current Epic-ordered facility-administered medications on file.       Past Medical History:   Diagnosis Date   • Asthma    • Depression 9/13/2021   • CAITLYN (generalized anxiety  "disorder) 2021   • GERD (gastroesophageal reflux disease)    • Hyperlipidemia    • Migraine        Social History     Tobacco Use   • Smoking status: Former Smoker     Packs/day: 0.25     Years: 10.00     Pack years: 2.50     Types: Cigars   • Smokeless tobacco: Current User     Types: Chew     Last attempt to quit: 2016   Vaping Use   • Vaping Use: Never used   Substance Use Topics   • Alcohol use: Yes     Alcohol/week: 1.8 oz     Types: 3 Cans of beer per week     Comment: weekly   • Drug use: No       Family Status   Relation Name Status   • Mo  Alive   • Fa     • MGMo     • MGFa     • PGMo     • PGFa       Family History   Problem Relation Age of Onset   • No Known Problems Mother    • Suicide Attempts Father    • Diabetes Maternal Grandmother    • Dementia Maternal Grandfather    • Diabetes Paternal Grandmother    • Rheumatologic Disease Paternal Grandmother    • Heart Attack Paternal Grandfather        Review of Systems:   Pertinent systems reviewed with the patient.    Exam:  /88   Pulse 76   Temp 35.9 °C (96.6 °F) (Temporal)   Resp 16   Ht 1.803 m (5' 11\")   Wt 125 kg (275 lb)   SpO2 96%   General: Well developed, well nourished. No distress.    HEENT: Head is grossly normal.  Pulmonary: Unlabored respiratory effort. Lungs clear to auscultation, no wheezes, no rhonchi.    Cardiovascular: Regular rate and rhythm without murmur.   Neurologic: Grossly nonfocal. No facial asymmetry noted.  Lymph: No cervical lymphadenopathy noted.  Skin: Warm, dry, good turgor. No rashes in visible areas.   Psych: Normal mood. Alert and oriented to person, place and time.    Assessment/Plan:  Increasing Advair.  Patient to have labs drawn and repeat chest x-ray in the next month.  Extending work note.  Follow-up for any changes in symptoms.  Follow-up with primary care as scheduled.  1. Chronic cough  fluticasone-salmeterol (ADVAIR HFA) 115-21 MCG/ACT inhaler    " DX-CHEST-2 VIEWS    CBC WITH DIFFERENTIAL    Comp Metabolic Panel   2. SOB (shortness of breath)  fluticasone-salmeterol (ADVAIR HFA) 115-21 MCG/ACT inhaler    DX-CHEST-2 VIEWS    CBC WITH DIFFERENTIAL    Comp Metabolic Panel   3. Fatigue, unspecified type  CBC WITH DIFFERENTIAL    Comp Metabolic Panel

## 2021-12-01 NOTE — LETTER
December 1, 2021         Patient: Hakan Murrieta   YOB: 1975   Date of Visit: 12/1/2021           To Whom it May Concern:    Hakan Murrieta was seen in my clinic on 12/1/2021. He may return to work 1/3/21.    If you have any questions or concerns, please don't hesitate to call.        Sincerely,           Rain Romeo P.A.-C.  Electronically Signed

## 2021-12-07 DIAGNOSIS — R06.02 SOB (SHORTNESS OF BREATH): ICD-10-CM

## 2021-12-07 DIAGNOSIS — R06.2 WHEEZING: ICD-10-CM

## 2021-12-07 RX ORDER — ALBUTEROL SULFATE 2.5 MG/3ML
2.5 SOLUTION RESPIRATORY (INHALATION) EVERY 4 HOURS PRN
Qty: 30 EACH | Refills: 1 | Status: SHIPPED | OUTPATIENT
Start: 2021-12-07 | End: 2023-06-14

## 2021-12-13 ENCOUNTER — APPOINTMENT (OUTPATIENT)
Dept: URGENT CARE | Facility: PHYSICIAN GROUP | Age: 46
End: 2021-12-13
Payer: COMMERCIAL

## 2021-12-13 ENCOUNTER — TELEPHONE (OUTPATIENT)
Dept: URGENT CARE | Facility: PHYSICIAN GROUP | Age: 46
End: 2021-12-13

## 2021-12-13 ENCOUNTER — APPOINTMENT (OUTPATIENT)
Dept: RADIOLOGY | Facility: IMAGING CENTER | Age: 46
End: 2021-12-13
Attending: PHYSICIAN ASSISTANT
Payer: COMMERCIAL

## 2021-12-13 DIAGNOSIS — R06.02 SOB (SHORTNESS OF BREATH): ICD-10-CM

## 2021-12-13 DIAGNOSIS — R05.3 CHRONIC COUGH: ICD-10-CM

## 2021-12-13 PROCEDURE — 71046 X-RAY EXAM CHEST 2 VIEWS: CPT | Mod: TC,FY | Performed by: PHYSICIAN ASSISTANT

## 2021-12-13 NOTE — LETTER
December 13, 2021         Patient: Hakan Murrieta   YOB: 1975   Date of Visit: 12/13/2021           To Whom it May Concern:    Hakan Murrieta was diagnosed with COVID 9/21/2021 and COVID pneumonia 9/27/2021.  Patient has been seen multiple times in the urgent care and primary care due to this and continues to have COVID related issues regarding fatigue, shortness of breath, and intermittent dizziness.  Patient continues to need breathing treatments on a daily basis and has had to recently adjust his inhalers as his shortness of breath remains uncontrolled.  He will need to remain out of work at this time and until further notice.    If you have any questions or concerns, please don't hesitate to call.        Sincerely,           Rain Romeo P.A.-C.  Electronically Signed

## 2021-12-30 ENCOUNTER — OFFICE VISIT (OUTPATIENT)
Dept: URGENT CARE | Facility: PHYSICIAN GROUP | Age: 46
End: 2021-12-30
Payer: COMMERCIAL

## 2021-12-30 VITALS
RESPIRATION RATE: 16 BRPM | SYSTOLIC BLOOD PRESSURE: 148 MMHG | OXYGEN SATURATION: 96 % | WEIGHT: 278.4 LBS | HEIGHT: 70 IN | TEMPERATURE: 97.5 F | BODY MASS INDEX: 39.86 KG/M2 | DIASTOLIC BLOOD PRESSURE: 100 MMHG | HEART RATE: 91 BPM

## 2021-12-30 DIAGNOSIS — G93.31 POSTVIRAL FATIGUE SYNDROME: ICD-10-CM

## 2021-12-30 DIAGNOSIS — R06.02 SHORTNESS OF BREATH: ICD-10-CM

## 2021-12-30 DIAGNOSIS — R05.3 COVID-19 LONG HAULER MANIFESTING CHRONIC COUGH: ICD-10-CM

## 2021-12-30 DIAGNOSIS — R42 DIZZINESS: ICD-10-CM

## 2021-12-30 DIAGNOSIS — U09.9 COVID-19 LONG HAULER MANIFESTING CHRONIC COUGH: ICD-10-CM

## 2021-12-30 PROCEDURE — 99214 OFFICE O/P EST MOD 30 MIN: CPT | Performed by: NURSE PRACTITIONER

## 2021-12-30 RX ORDER — CLONAZEPAM 1 MG/1
1 TABLET ORAL PRN
COMMUNITY
End: 2022-01-11

## 2021-12-30 RX ORDER — BENZONATATE 100 MG/1
CAPSULE ORAL
COMMUNITY
End: 2022-12-13

## 2021-12-30 RX ORDER — MONTELUKAST SODIUM 10 MG/1
TABLET ORAL
COMMUNITY
End: 2022-12-13

## 2021-12-30 RX ORDER — FLUTICASONE PROPIONATE 50 MCG
SPRAY, SUSPENSION (ML) NASAL
COMMUNITY
End: 2022-12-13

## 2021-12-30 RX ORDER — SUMATRIPTAN 50 MG/1
1 TABLET, FILM COATED ORAL PRN
COMMUNITY
End: 2022-01-11

## 2021-12-30 RX ORDER — OMEPRAZOLE 40 MG/1
CAPSULE, DELAYED RELEASE ORAL
COMMUNITY
End: 2022-12-13

## 2021-12-30 ASSESSMENT — ENCOUNTER SYMPTOMS
MYALGIAS: 0
DIZZINESS: 1
FEVER: 0
NAUSEA: 0
ABDOMINAL PAIN: 0
DIARRHEA: 1
HEMOPTYSIS: 0
SPUTUM PRODUCTION: 1
WHEEZING: 1
COUGH: 1
SHORTNESS OF BREATH: 1
VOMITING: 0
HEADACHES: 1
CHILLS: 0

## 2021-12-30 NOTE — PROGRESS NOTES
Subjective:     Hakan Murrieta is a 46 y.o. male who presents for Follow-Up (Covid pneumonia x since september ) and Other (SOB / headaches / coughing / fatigue )      Other  Associated symptoms include chest pain, coughing and headaches. Pertinent negatives include no abdominal pain, chills, fever, myalgias, nausea or vomiting.     Pt presents for evaluation of a new problem. Hakan is a 46 year old male who presents to  today for a follow up visit from his COVID pneumonia diagnoses   On 9/27/2021. This is his 5th visit to  today for his ongoing symptoms of long COVID. He complains of ongoing fatigue, shortness of breath, intermittent cough that are dry and non productive, diarrhea, decreased appetite, and headaches. He has been on short term disability for the past 3 months. He does have a follow up appointment with pulmonary, but was unable to get in to be seen until April of 2022. He is using is using his Advair inhaler as well and Albuterol as needed.   Review of Systems   Constitutional: Positive for malaise/fatigue. Negative for chills and fever.   Respiratory: Positive for cough, sputum production, shortness of breath and wheezing. Negative for hemoptysis.    Cardiovascular: Positive for chest pain.   Gastrointestinal: Positive for diarrhea. Negative for abdominal pain, nausea and vomiting.   Musculoskeletal: Negative for myalgias.   Neurological: Positive for dizziness and headaches.       PMH:   Past Medical History:   Diagnosis Date   • Asthma    • Depression 9/13/2021   • CAITLYN (generalized anxiety disorder) 9/13/2021   • GERD (gastroesophageal reflux disease)    • Hyperlipidemia    • Migraine      ALLERGIES: No Known Allergies  SURGHX:   Past Surgical History:   Procedure Laterality Date   • CHOLECYSTECTOMY     • INGUINAL HERNIA REPAIR CHILD Right    • TONSILLECTOMY AND ADENOIDECTOMY     • VASECTOMY       SOCHX:   Social History     Socioeconomic History   • Marital status: Single     Spouse name:  Not on file   • Number of children: Not on file   • Years of education: Not on file   • Highest education level: Not on file   Occupational History   • Not on file   Tobacco Use   • Smoking status: Former Smoker     Packs/day: 0.25     Years: 10.00     Pack years: 2.50     Types: Cigars   • Smokeless tobacco: Current User     Types: Chew     Last attempt to quit: 1/1/2016   Vaping Use   • Vaping Use: Never used   Substance and Sexual Activity   • Alcohol use: Yes     Alcohol/week: 1.8 oz     Types: 3 Cans of beer per week     Comment: weekly   • Drug use: No   • Sexual activity: Yes     Partners: Female     Birth control/protection: Male Sterilization   Other Topics Concern   • Not on file   Social History Narrative   • Not on file     Social Determinants of Health     Financial Resource Strain:    • Difficulty of Paying Living Expenses: Not on file   Food Insecurity:    • Worried About Running Out of Food in the Last Year: Not on file   • Ran Out of Food in the Last Year: Not on file   Transportation Needs:    • Lack of Transportation (Medical): Not on file   • Lack of Transportation (Non-Medical): Not on file   Physical Activity:    • Days of Exercise per Week: Not on file   • Minutes of Exercise per Session: Not on file   Stress:    • Feeling of Stress : Not on file   Social Connections:    • Frequency of Communication with Friends and Family: Not on file   • Frequency of Social Gatherings with Friends and Family: Not on file   • Attends Muslim Services: Not on file   • Active Member of Clubs or Organizations: Not on file   • Attends Club or Organization Meetings: Not on file   • Marital Status: Not on file   Intimate Partner Violence:    • Fear of Current or Ex-Partner: Not on file   • Emotionally Abused: Not on file   • Physically Abused: Not on file   • Sexually Abused: Not on file   Housing Stability:    • Unable to Pay for Housing in the Last Year: Not on file   • Number of Places Lived in the Last Year:  "Not on file   • Unstable Housing in the Last Year: Not on file     FH:   Family History   Problem Relation Age of Onset   • No Known Problems Mother    • Suicide Attempts Father    • Diabetes Maternal Grandmother    • Dementia Maternal Grandfather    • Diabetes Paternal Grandmother    • Rheumatologic Disease Paternal Grandmother    • Heart Attack Paternal Grandfather          Objective:   /100 (BP Location: Right arm, Patient Position: Sitting, BP Cuff Size: Adult long)   Pulse 91   Temp 36.4 °C (97.5 °F) (Temporal)   Resp 16   Ht 1.778 m (5' 10\")   Wt (!) 126 kg (278 lb 6.4 oz)   SpO2 96%   BMI 39.95 kg/m²     Physical Exam  Vitals and nursing note reviewed.   Constitutional:       General: He is not in acute distress.     Appearance: Normal appearance. He is not ill-appearing.   HENT:      Head: Normocephalic and atraumatic.      Right Ear: External ear normal.      Left Ear: External ear normal.      Nose: No congestion or rhinorrhea.      Mouth/Throat:      Mouth: Mucous membranes are moist.      Pharynx: No oropharyngeal exudate or posterior oropharyngeal erythema.   Eyes:      Extraocular Movements: Extraocular movements intact.      Pupils: Pupils are equal, round, and reactive to light.   Cardiovascular:      Rate and Rhythm: Normal rate and regular rhythm.      Pulses: Normal pulses.      Heart sounds: Normal heart sounds.   Pulmonary:      Effort: Pulmonary effort is normal. No respiratory distress.      Breath sounds: Normal breath sounds. No stridor. No wheezing, rhonchi or rales.   Chest:      Chest wall: No tenderness.   Abdominal:      General: Abdomen is flat. Bowel sounds are normal.      Palpations: Abdomen is soft.      Tenderness: There is no abdominal tenderness. There is no right CVA tenderness or left CVA tenderness.   Musculoskeletal:         General: Normal range of motion.      Cervical back: Normal range of motion and neck supple.   Skin:     General: Skin is warm and dry.     "  Capillary Refill: Capillary refill takes less than 2 seconds.   Neurological:      General: No focal deficit present.      Mental Status: He is alert and oriented to person, place, and time. Mental status is at baseline.   Psychiatric:         Mood and Affect: Mood normal.         Behavior: Behavior normal.         Thought Content: Thought content normal.         Judgment: Judgment normal.         Assessment/Plan:   Assessment    1. COVID-19 long hauler manifesting chronic cough  CT-CTA CHEST PULMONARY ARTERY W/ RECONS   2. Shortness of breath  CT-CTA CHEST PULMONARY ARTERY W/ RECONS   3. Postviral fatigue syndrome  CT-CTA CHEST PULMONARY ARTERY W/ RECONS   4. Dizziness  CT-CTA CHEST PULMONARY ARTERY W/ RECONS     Work note extended.  He was educated that for future he will need to obtain this from primary care provider as we are unable to provide disability paperwork in urgent care.  CTA of chest ordered for further evaluation of shortness of breath and cough.  His previous 3 chest x-rays were negative for Covid pneumonia.  Continue with supportive treatments and inhalers at home.   AVS handout given and reviewed with patient. Pt educated on red flags and when to seek treatment back in ER or UC.

## 2021-12-30 NOTE — LETTER
December 30, 2021    To Whom It May Concern:         This is confirmation that Hakan Murrieta attended his scheduled appointment with MARNI Underwood on 12/30/21.   Hakan was diagnosed with Covid pneumonia on 9/27/2021 and is continuing to experience symptoms of lung Covid. His symptoms include shortness of breath, dizziness, cough and fatigue. Additional tests are being run at this time for his symptoms. He continues to require breathing treatments throughout the day and is unable to return to work at this time. Please excuse his absence until 1/11/2021.        If you have any questions please do not hesitate to call me at the phone number listed below.    Sincerely,          LUIS ANTONIO Underwood.  [phone]

## 2022-01-04 ENCOUNTER — HOSPITAL ENCOUNTER (OUTPATIENT)
Dept: LAB | Facility: MEDICAL CENTER | Age: 47
End: 2022-01-04
Attending: PHYSICIAN ASSISTANT
Payer: COMMERCIAL

## 2022-01-04 ENCOUNTER — HOSPITAL ENCOUNTER (OUTPATIENT)
Dept: LAB | Facility: MEDICAL CENTER | Age: 47
End: 2022-01-04
Attending: NURSE PRACTITIONER
Payer: COMMERCIAL

## 2022-01-04 DIAGNOSIS — R73.03 PREDIABETES: ICD-10-CM

## 2022-01-04 DIAGNOSIS — R05.3 CHRONIC COUGH: ICD-10-CM

## 2022-01-04 DIAGNOSIS — R06.02 SOB (SHORTNESS OF BREATH): ICD-10-CM

## 2022-01-04 DIAGNOSIS — R53.83 FATIGUE, UNSPECIFIED TYPE: ICD-10-CM

## 2022-01-04 DIAGNOSIS — Z13.21 ENCOUNTER FOR VITAMIN DEFICIENCY SCREENING: ICD-10-CM

## 2022-01-04 DIAGNOSIS — E78.1 HYPERTRIGLYCERIDEMIA: ICD-10-CM

## 2022-01-04 LAB
25(OH)D3 SERPL-MCNC: 23 NG/ML (ref 30–100)
ALBUMIN SERPL BCP-MCNC: 4.3 G/DL (ref 3.2–4.9)
ALBUMIN SERPL BCP-MCNC: 4.4 G/DL (ref 3.2–4.9)
ALBUMIN/GLOB SERPL: 2 G/DL
ALP SERPL-CCNC: 74 U/L (ref 30–99)
ALP SERPL-CCNC: 74 U/L (ref 30–99)
ALT SERPL-CCNC: 72 U/L (ref 2–50)
ALT SERPL-CCNC: 73 U/L (ref 2–50)
ANION GAP SERPL CALC-SCNC: 12 MMOL/L (ref 7–16)
AST SERPL-CCNC: 45 U/L (ref 12–45)
AST SERPL-CCNC: 46 U/L (ref 12–45)
BASOPHILS # BLD AUTO: 1.3 % (ref 0–1.8)
BASOPHILS # BLD: 0.11 K/UL (ref 0–0.12)
BILIRUB CONJ SERPL-MCNC: <0.2 MG/DL (ref 0.1–0.5)
BILIRUB INDIRECT SERPL-MCNC: ABNORMAL MG/DL (ref 0–1)
BILIRUB SERPL-MCNC: 0.4 MG/DL (ref 0.1–1.5)
BILIRUB SERPL-MCNC: 0.4 MG/DL (ref 0.1–1.5)
BUN SERPL-MCNC: 10 MG/DL (ref 8–22)
CALCIUM SERPL-MCNC: 8.9 MG/DL (ref 8.5–10.5)
CHLORIDE SERPL-SCNC: 108 MMOL/L (ref 96–112)
CHOLEST SERPL-MCNC: 158 MG/DL (ref 100–199)
CO2 SERPL-SCNC: 21 MMOL/L (ref 20–33)
CREAT SERPL-MCNC: 0.83 MG/DL (ref 0.5–1.4)
EOSINOPHIL # BLD AUTO: 0.71 K/UL (ref 0–0.51)
EOSINOPHIL NFR BLD: 8.7 % (ref 0–6.9)
ERYTHROCYTE [DISTWIDTH] IN BLOOD BY AUTOMATED COUNT: 44.3 FL (ref 35.9–50)
FASTING STATUS PATIENT QL REPORTED: NORMAL
FASTING STATUS PATIENT QL REPORTED: NORMAL
GLOBULIN SER CALC-MCNC: 2.2 G/DL (ref 1.9–3.5)
GLUCOSE SERPL-MCNC: 125 MG/DL (ref 65–99)
HCT VFR BLD AUTO: 48.7 % (ref 42–52)
HDLC SERPL-MCNC: 38 MG/DL
HGB BLD-MCNC: 16.5 G/DL (ref 14–18)
IMM GRANULOCYTES # BLD AUTO: 0.02 K/UL (ref 0–0.11)
IMM GRANULOCYTES NFR BLD AUTO: 0.2 % (ref 0–0.9)
LDLC SERPL CALC-MCNC: 72 MG/DL
LYMPHOCYTES # BLD AUTO: 2.14 K/UL (ref 1–4.8)
LYMPHOCYTES NFR BLD: 26.1 % (ref 22–41)
MCH RBC QN AUTO: 29.9 PG (ref 27–33)
MCHC RBC AUTO-ENTMCNC: 33.9 G/DL (ref 33.7–35.3)
MCV RBC AUTO: 88.2 FL (ref 81.4–97.8)
MONOCYTES # BLD AUTO: 0.67 K/UL (ref 0–0.85)
MONOCYTES NFR BLD AUTO: 8.2 % (ref 0–13.4)
NEUTROPHILS # BLD AUTO: 4.55 K/UL (ref 1.82–7.42)
NEUTROPHILS NFR BLD: 55.5 % (ref 44–72)
NRBC # BLD AUTO: 0 K/UL
NRBC BLD-RTO: 0 /100 WBC
PLATELET # BLD AUTO: 304 K/UL (ref 164–446)
PMV BLD AUTO: 10.6 FL (ref 9–12.9)
POTASSIUM SERPL-SCNC: 4.4 MMOL/L (ref 3.6–5.5)
PROT SERPL-MCNC: 6.6 G/DL (ref 6–8.2)
PROT SERPL-MCNC: 6.6 G/DL (ref 6–8.2)
RBC # BLD AUTO: 5.52 M/UL (ref 4.7–6.1)
SODIUM SERPL-SCNC: 141 MMOL/L (ref 135–145)
TRIGL SERPL-MCNC: 238 MG/DL (ref 0–149)
WBC # BLD AUTO: 8.2 K/UL (ref 4.8–10.8)

## 2022-01-04 PROCEDURE — 80076 HEPATIC FUNCTION PANEL: CPT

## 2022-01-04 PROCEDURE — 80061 LIPID PANEL: CPT

## 2022-01-04 PROCEDURE — 85025 COMPLETE CBC W/AUTO DIFF WBC: CPT

## 2022-01-04 PROCEDURE — 82306 VITAMIN D 25 HYDROXY: CPT

## 2022-01-04 PROCEDURE — 36415 COLL VENOUS BLD VENIPUNCTURE: CPT

## 2022-01-04 PROCEDURE — 80053 COMPREHEN METABOLIC PANEL: CPT

## 2022-01-11 ENCOUNTER — OFFICE VISIT (OUTPATIENT)
Dept: MEDICAL GROUP | Facility: PHYSICIAN GROUP | Age: 47
End: 2022-01-11
Payer: COMMERCIAL

## 2022-01-11 VITALS
TEMPERATURE: 97.5 F | OXYGEN SATURATION: 98 % | SYSTOLIC BLOOD PRESSURE: 128 MMHG | HEIGHT: 71 IN | WEIGHT: 283.2 LBS | DIASTOLIC BLOOD PRESSURE: 84 MMHG | BODY MASS INDEX: 39.65 KG/M2 | RESPIRATION RATE: 16 BRPM | HEART RATE: 83 BPM

## 2022-01-11 DIAGNOSIS — R06.83 SNORING: ICD-10-CM

## 2022-01-11 DIAGNOSIS — R73.03 PREDIABETES: ICD-10-CM

## 2022-01-11 DIAGNOSIS — E78.1 HYPERTRIGLYCERIDEMIA: ICD-10-CM

## 2022-01-11 DIAGNOSIS — U09.9 COVID-19 LONG HAULER: ICD-10-CM

## 2022-01-11 PROCEDURE — 99214 OFFICE O/P EST MOD 30 MIN: CPT | Performed by: NURSE PRACTITIONER

## 2022-01-11 ASSESSMENT — FIBROSIS 4 INDEX: FIB4 SCORE: 0.78

## 2022-01-11 ASSESSMENT — PATIENT HEALTH QUESTIONNAIRE - PHQ9: CLINICAL INTERPRETATION OF PHQ2 SCORE: 0

## 2022-01-11 NOTE — ASSESSMENT & PLAN NOTE
Chronic condition.  Patient continues to show elevated fasting blood sugar levels.  He denies any symptoms of polyuria, polydipsia, vision changes, or nausea.  Patient will be due for labs and recheck A1c level in September 2022.    Education was provided about appropriate diet lifestyle modifications including decreasing carbs, and encouraging exercise as tolerated.  Patient is limited due to post COVID symptoms.

## 2022-01-11 NOTE — PROGRESS NOTES
Chief Complaint   Patient presents with   • Lab Results   • Shortness of Breath     dizziness x since september        Subjective:     HPI:   Hakan Murrieta is a 46 y.o. male here to discuss the evaluation and management of:      COVID-19 long hauler  Patient presents today to discuss lingering symptoms of previous COVID infection back in mid September.  He reports he continues to have fatigue, shortness of breath, radiating chest pain, and occasional left arm numbness and occasional dizziness.  He reports that his cough did resolve however has recently returned.  He denies any sputum or mucus production but does feel chest congestion.    He reports at home with his pulse oximetry device his oxygen saturation is in the low- mid 90s with a high of 95-98 with a fluctuating heart rate between 70 to mid 100s but the average is between 70 and 90.  He is currently using Advair 1 puff twice daily, albuterol inhaler once to twice daily, and albuterol nebulized solution and Atrovent nebulized solution as needed.  Discussed with patient that side effects to some of these medications include tachycardia.  He denies needing refills of any of his breathing agents at this time.    Patient has not currently COVID vaccinated.  Patient was seen in urgent care on 12-30- 2021 and CTA was ordered.  Patient has CTA scheduled for tomorrow.  Encourage patient to keep this appointment for further evaluation for risk of possible PEs.  Patient is scheduled with pulmonary COVID clinic in April 2022.    ER precautions were discussed and signs symptoms of PE.      Snoring  This is a chronic and ongoing health concern.  Patient did follow-up with pulmonology at the [facility] and reports having a sleep study done however records have not been sent for review.  Patient also reports that pulmonology has not contacted them in regards to the results.  We will continue to await results and will request records at next appointment if they do  not transfer over.    Hypertriglyceridemia  Component      Latest Ref Rng & Units 1/4/2022           8:44 AM   Cholesterol,Tot      100 - 199 mg/dL 158   Triglycerides      0 - 149 mg/dL 238 (H)   HDL      >=40 mg/dL 38 (A)   LDL      <100 mg/dL 72     The 10-year ASCVD risk score (Cherri BARNES Jr., et al., 2013) is: 1.9%    Values used to calculate the score:      Age: 46 years      Sex: Male      Is Non- : No      Diabetic: No      Tobacco smoker: No      Systolic Blood Pressure: 128 mmHg      Is BP treated: No      HDL Cholesterol: 38 mg/dL      Total Cholesterol: 158 mg/dL    This is a chronic condition.  Reviewed labs with patient and answered all questions.  Education was provided about appropriate diet and lifestyle modifications to help decrease triglyceride levels.  Encourage patient to ambulate as tolerated to help with HDL levels.   Reviewed ASCVD risk score and patient is not eligible at this time for statin medication.    Prediabetes  Chronic condition.  Patient continues to show elevated fasting blood sugar levels.  He denies any symptoms of polyuria, polydipsia, vision changes, or nausea.  Patient will be due for labs and recheck A1c level in September 2022.    Education was provided about appropriate diet lifestyle modifications including decreasing carbs, and encouraging exercise as tolerated.  Patient is limited due to post COVID symptoms.          ROS:  Per HPI    No Known Allergies    Current medicines (including changes today)  Current Outpatient Medications   Medication Sig Dispense Refill   • benzonatate (TESSALON) 100 MG Cap benzonatate 100 mg capsule     • fluticasone (FLONASE) 50 MCG/ACT nasal spray fluticasone propionate 50 mcg/actuation nasal spray,suspension     • montelukast (SINGULAIR) 10 MG Tab montelukast 10 mg tablet     • omeprazole (PRILOSEC) 40 MG delayed-release capsule omeprazole 40 mg capsule,delayed release     • albuterol (PROVENTIL) 2.5mg/3ml Nebu Soln  solution for nebulization Take 3 mL by nebulization every four hours as needed for Shortness of Breath. 30 Each 1   • ipratropium (ATROVENT) 0.02 % Solution Use one vial via nebulizer every 4 hours as needed for shortness of breath 30 Each 1   • fluticasone-salmeterol (ADVAIR HFA) 115-21 MCG/ACT inhaler Inhale 1 Puff 2 times a day. 12 g 11   • albuterol 108 (90 Base) MCG/ACT Aero Soln inhalation aerosol Inhale 2 Puffs every 6 hours as needed for Shortness of Breath. 8.5 g 0     No current facility-administered medications for this visit.       Social History     Tobacco Use   • Smoking status: Former Smoker     Packs/day: 0.25     Years: 10.00     Pack years: 2.50     Types: Cigars   • Smokeless tobacco: Current User     Types: Chew     Last attempt to quit: 1/1/2016   Vaping Use   • Vaping Use: Never used   Substance Use Topics   • Alcohol use: Yes     Alcohol/week: 1.8 oz     Types: 3 Cans of beer per week     Comment: weekly   • Drug use: No       Patient Active Problem List    Diagnosis Date Noted   • COVID-19 long hauler 10/11/2021   • Elevated liver enzymes 10/11/2021   • Allergic rhinitis due to pollen 09/13/2021   • Elevated blood pressure reading without diagnosis of hypertension 09/13/2021   • Hypertriglyceridemia 09/13/2021   • Mild intermittent asthma 09/13/2021   • Obesity 09/13/2021   • Prediabetes 09/13/2021   • Reactive airway disease 09/13/2021   • Tobacco user 09/13/2021   • Depression 09/13/2021   • CAITLYN (generalized anxiety disorder) 09/13/2021   • Weight gain 09/13/2021   • Cough 09/13/2021   • Snoring 09/13/2021   • Hand numbness 09/13/2021   • Migraine headache 11/05/2015       Family History   Problem Relation Age of Onset   • No Known Problems Mother    • Suicide Attempts Father    • Diabetes Maternal Grandmother    • Dementia Maternal Grandfather    • Diabetes Paternal Grandmother    • Rheumatologic Disease Paternal Grandmother    • Heart Attack Paternal Grandfather           Objective:      Hospital Outpatient Visit on 01/04/2022   Component Date Value Ref Range Status   • Sodium 01/04/2022 141  135 - 145 mmol/L Final   • Potassium 01/04/2022 4.4  3.6 - 5.5 mmol/L Final   • Chloride 01/04/2022 108  96 - 112 mmol/L Final   • Co2 01/04/2022 21  20 - 33 mmol/L Final   • Anion Gap 01/04/2022 12.0  7.0 - 16.0 Final   • Glucose 01/04/2022 125* 65 - 99 mg/dL Final   • Bun 01/04/2022 10  8 - 22 mg/dL Final   • Creatinine 01/04/2022 0.83  0.50 - 1.40 mg/dL Final   • Calcium 01/04/2022 8.9  8.5 - 10.5 mg/dL Final   • AST(SGOT) 01/04/2022 45  12 - 45 U/L Final   • ALT(SGPT) 01/04/2022 73* 2 - 50 U/L Final   • Alkaline Phosphatase 01/04/2022 74  30 - 99 U/L Final   • Total Bilirubin 01/04/2022 0.4  0.1 - 1.5 mg/dL Final   • Albumin 01/04/2022 4.4  3.2 - 4.9 g/dL Final   • Total Protein 01/04/2022 6.6  6.0 - 8.2 g/dL Final   • Globulin 01/04/2022 2.2  1.9 - 3.5 g/dL Final   • A-G Ratio 01/04/2022 2.0  g/dL Final   • WBC 01/04/2022 8.2  4.8 - 10.8 K/uL Final   • RBC 01/04/2022 5.52  4.70 - 6.10 M/uL Final   • Hemoglobin 01/04/2022 16.5  14.0 - 18.0 g/dL Final   • Hematocrit 01/04/2022 48.7  42.0 - 52.0 % Final   • MCV 01/04/2022 88.2  81.4 - 97.8 fL Final   • MCH 01/04/2022 29.9  27.0 - 33.0 pg Final   • MCHC 01/04/2022 33.9  33.7 - 35.3 g/dL Final   • RDW 01/04/2022 44.3  35.9 - 50.0 fL Final   • Platelet Count 01/04/2022 304  164 - 446 K/uL Final   • MPV 01/04/2022 10.6  9.0 - 12.9 fL Final   • Neutrophils-Polys 01/04/2022 55.50  44.00 - 72.00 % Final   • Lymphocytes 01/04/2022 26.10  22.00 - 41.00 % Final   • Monocytes 01/04/2022 8.20  0.00 - 13.40 % Final   • Eosinophils 01/04/2022 8.70* 0.00 - 6.90 % Final   • Basophils 01/04/2022 1.30  0.00 - 1.80 % Final   • Immature Granulocytes 01/04/2022 0.20  0.00 - 0.90 % Final   • Nucleated RBC 01/04/2022 0.00  /100 WBC Final   • Neutrophils (Absolute) 01/04/2022 4.55  1.82 - 7.42 K/uL Final    Includes immature neutrophils, if present.   • Lymphs (Absolute)  01/04/2022 2.14  1.00 - 4.80 K/uL Final   • Monos (Absolute) 01/04/2022 0.67  0.00 - 0.85 K/uL Final   • Eos (Absolute) 01/04/2022 0.71* 0.00 - 0.51 K/uL Final   • Baso (Absolute) 01/04/2022 0.11  0.00 - 0.12 K/uL Final   • Immature Granulocytes (abs) 01/04/2022 0.02  0.00 - 0.11 K/uL Final   • NRBC (Absolute) 01/04/2022 0.00  K/uL Final   • Fasting Status 01/04/2022 Fasting   Final   • GFR If  01/04/2022 >60  >60 mL/min/1.73 m 2 Final   • GFR If Non  01/04/2022 >60  >60 mL/min/1.73 m 2 Final   Hospital Outpatient Visit on 01/04/2022   Component Date Value Ref Range Status   • Cholesterol,Tot 01/04/2022 158  100 - 199 mg/dL Final   • Triglycerides 01/04/2022 238* 0 - 149 mg/dL Final   • HDL 01/04/2022 38* >=40 mg/dL Final   • LDL 01/04/2022 72  <100 mg/dL Final   • Alkaline Phosphatase 01/04/2022 74  30 - 99 U/L Final   • AST(SGOT) 01/04/2022 46* 12 - 45 U/L Final   • ALT(SGPT) 01/04/2022 72* 2 - 50 U/L Final   • Total Bilirubin 01/04/2022 0.4  0.1 - 1.5 mg/dL Final   • Direct Bilirubin 01/04/2022 <0.2  0.1 - 0.5 mg/dL Final   • Indirect Bilirubin 01/04/2022 see below  0.0 - 1.0 mg/dL Final    Comment: Unable to calculate indirect bilirubin due to a total or direct  bilirubin result being outside the measurement range of the analyzer.     • Albumin 01/04/2022 4.3  3.2 - 4.9 g/dL Final   • Total Protein 01/04/2022 6.6  6.0 - 8.2 g/dL Final   • 25-Hydroxy   Vitamin D 25 01/04/2022 23* 30 - 100 ng/mL Final    Comment: Adult Ranges:   <20 ng/mL - Deficiency  20-29 ng/mL - Insufficiency   ng/mL - Sufficiency  Effective 3/18/2020, this electrochemiluminescence binding assay is  performed using Roche stephan e immunoassay analyzer.  The [employer] Vitamin D  total II assay is intended for the quantitative determination of total 25  hydroxyvitamin D in human serum and plasma. This assay is to be used as an  aid in the assessment of vitamin D sufficiency in adults.     • Fasting Status  "01/04/2022 Fasting   Final       /84 (BP Location: Right arm, Patient Position: Sitting, BP Cuff Size: Adult long)   Pulse 83   Temp 36.4 °C (97.5 °F) (Temporal)   Resp 16   Ht 1.803 m (5' 11\")   Wt (!) 128 kg (283 lb 3.2 oz)   SpO2 98%  Body mass index is 39.5 kg/m².    Physical Exam:  Constitutional: Well-developed and well-nourished male in NAD. Not diaphoretic. No distress.   Skin: warm, dry, intact, no evidence of rash or concerning lesions  Head: Atraumatic without lesions.  Eyes: Conjunctivae and extraocular motions are normal. Pupils are equal, round, and reactive to light. No scleral icterus.   Ears:  External ears unremarkable. TMs normal; bilaterally  Cardiovascular: Regular rate and rhythm without murmur. Radial pulses are intact and equal bilaterally.  Pulmonary: Clear to ausculation. Normal effort. No rales, ronchi, or wheezing.  Abdomen: Soft, non tender, and without distention. Active bowel sounds in all four quadrants. No rebound, guarding, masses   Extremities: No cyanosis, clubbing, erythema, nor edema.   Neurological: Alert and oriented x 3. No cranial nerve deficit. 5/5 myotomes. Sensation intact.   Psychiatric:  Behavior, mood, and affect are appropriate.      Assessment and Plan:     The following treatment plan was discussed:  I have reviewed all labs with patient and answered all questions.    1. COVID-19 long hauler  Chronic and ongoing condition.  Patient will continue taking medications as previously prescribed.  No change in medication or treatment plan at this time.  Encourage patient to keep appointment to get CT/CTA tomorrow.  ER precautions were discussed.  Education was provided about symptoms of long COVID that could last anywhere from 4 months to up to a year or longer.  Encourage patient to keep appointment scheduled with [facility] pulmonology in April 2022.  Work note was extended until January 31, 2022.  Patient will need reevaluation prior to returning to " work.      2. Snoring  Chronic condition.  We will continue to monitor.  We will review records once sleep study has been sent from [place].    3. Hypertriglyceridemia  Discussed appropriate diet lifestyle modifications to help with hypertriglyceridemia.  Encourage patient to decrease carbs, alcohol, and increase exercise.    4. Prediabetes  Discussed diet, exercise, weight loss, behavioral modification, portion size management.      Any change or worsening of signs or symptoms, patient encouraged to follow-up or report to emergency room for further evaluation. Patient verbalizes understanding and agrees.    Follow-Up: Return in about 16 days (around 1/27/2022) for Reevaluation of long COVID symptoms.      PLEASE NOTE: This dictation was created using voice recognition software. I have made every reasonable attempt to correct obvious errors, but I expect that there are errors of grammar and possibly content that I did not discover before finalizing the note.

## 2022-01-11 NOTE — ASSESSMENT & PLAN NOTE
Component      Latest Ref Rng & Units 1/4/2022           8:44 AM   Cholesterol,Tot      100 - 199 mg/dL 158   Triglycerides      0 - 149 mg/dL 238 (H)   HDL      >=40 mg/dL 38 (A)   LDL      <100 mg/dL 72     The 10-year ASCVD risk score (Cherri BARNES Jr., et al., 2013) is: 1.9%    Values used to calculate the score:      Age: 46 years      Sex: Male      Is Non- : No      Diabetic: No      Tobacco smoker: No      Systolic Blood Pressure: 128 mmHg      Is BP treated: No      HDL Cholesterol: 38 mg/dL      Total Cholesterol: 158 mg/dL    This is a chronic condition.  Reviewed labs with patient and answered all questions.  Education was provided about appropriate diet and lifestyle modifications to help decrease triglyceride levels.  Encourage patient to ambulate as tolerated to help with HDL levels.   Reviewed ASCVD risk score and patient is not eligible at this time for statin medication.

## 2022-01-11 NOTE — PATIENT INSTRUCTIONS
Start taking vitamin D supplement over-the-counter 2000 to 5000 international units daily to help increase your vitamin D level.

## 2022-01-11 NOTE — ASSESSMENT & PLAN NOTE
This is a chronic and ongoing health concern.  Patient did follow-up with pulmonology at the [facility] and reports having a sleep study done however records have not been sent for review.  Patient also reports that pulmonology has not contacted them in regards to the results.  We will continue to await results and will request records at next appointment if they do not transfer over.

## 2022-01-11 NOTE — LETTER
[address] [address]     January 11, 2022    Patient: Hakan Murrieta   YOB: 1975   Date of Visit: 1/11/2022       To Whom It May Concern:    Hakan Murrieta was seen and treated in our department on 1/11/2022. Unable to return to work due to long COVID symptoms.  Anticipated return to work date is January 31, 2022.    Sincerely,         LUIS ANTONIO Kaur.

## 2022-01-12 ENCOUNTER — HOSPITAL ENCOUNTER (OUTPATIENT)
Dept: RADIOLOGY | Facility: MEDICAL CENTER | Age: 47
End: 2022-01-12
Attending: NURSE PRACTITIONER
Payer: COMMERCIAL

## 2022-01-12 DIAGNOSIS — R06.02 SHORTNESS OF BREATH: ICD-10-CM

## 2022-01-12 DIAGNOSIS — R42 DIZZINESS: ICD-10-CM

## 2022-01-12 DIAGNOSIS — R05.3 COVID-19 LONG HAULER MANIFESTING CHRONIC COUGH: ICD-10-CM

## 2022-01-12 DIAGNOSIS — G93.31 POSTVIRAL FATIGUE SYNDROME: ICD-10-CM

## 2022-01-12 DIAGNOSIS — U09.9 COVID-19 LONG HAULER MANIFESTING CHRONIC COUGH: ICD-10-CM

## 2022-01-12 PROCEDURE — 700117 HCHG RX CONTRAST REV CODE 255: Performed by: NURSE PRACTITIONER

## 2022-01-12 PROCEDURE — 71275 CT ANGIOGRAPHY CHEST: CPT

## 2022-01-12 RX ADMIN — IOHEXOL 80 ML: 350 INJECTION, SOLUTION INTRAVENOUS at 08:40

## 2022-01-27 ENCOUNTER — OFFICE VISIT (OUTPATIENT)
Dept: MEDICAL GROUP | Facility: PHYSICIAN GROUP | Age: 47
End: 2022-01-27
Payer: COMMERCIAL

## 2022-01-27 VITALS
RESPIRATION RATE: 16 BRPM | TEMPERATURE: 97.4 F | OXYGEN SATURATION: 97 % | SYSTOLIC BLOOD PRESSURE: 124 MMHG | BODY MASS INDEX: 40.52 KG/M2 | DIASTOLIC BLOOD PRESSURE: 88 MMHG | HEIGHT: 70 IN | WEIGHT: 283 LBS | HEART RATE: 98 BPM

## 2022-01-27 DIAGNOSIS — R53.81 PHYSICAL DECONDITIONING: ICD-10-CM

## 2022-01-27 DIAGNOSIS — U09.9 COVID-19 LONG HAULER: ICD-10-CM

## 2022-01-27 PROCEDURE — 99214 OFFICE O/P EST MOD 30 MIN: CPT | Performed by: NURSE PRACTITIONER

## 2022-01-27 ASSESSMENT — FIBROSIS 4 INDEX: FIB4 SCORE: 0.78

## 2022-01-27 NOTE — ASSESSMENT & PLAN NOTE
This is a chronic and ongoing condition.  Patient continues to have long Covid symptoms from previous Covid infection in September 2021.  He reports he continues to have lingering symptoms of chronic fatigue, shortness of breath with exertion, intermittent chest pain, and occasional left arm numbness.  He does indicate that overall symptoms have mildly improved since our last visit.    He reports his fatigue is worse after he goes outside and works in the yard or does something strenuous and then he is down for 2 days with severe fatigue.    He does have a home pulse oximetry device where his oxygen saturation ranges from the low to mid 90s to 98 with fluctuating heart rates from .  Patient did not check to see if his pulse was equal to the heart rate on the machine.  Education was provided about use of pulse oximetry and ensuring heart rate is equal to pulse rate.    He is currently using Advair 1 puff twice daily and albuterol once to twice daily as needed for shortness of breath symptoms.  He does not need refills on these medications at this time.    Patient was not previously Covid vaccinated and is currently not Covid vaccinated.  Reviewed CTA ordered by previous urgent care provider with patient indicating no PEs.  Patient has follow-up appointment with Covid clinic and pulmonology in April 2022 encourage patient to call and see if they have an opening sooner.    Patient today is requesting a return to work note with restrictions.  He does indicate that he feels strong enough to return to work.    Plan:  Encourage patient to increase exercise as tolerated.  He will continue using inhalers as needed.  Encouraged him to reach out to the pulmonary Covid clinic to see if he can get an earlier appointment.  Work note was provided today to return to work with restrictions.  Encouraged adequate rest, nutrition, and at least 64 ounces of water per day.  Encourage vitamin supplements including vitamin D, zinc,  and vitamin B complex.

## 2022-01-27 NOTE — PROGRESS NOTES
Chief Complaint   Patient presents with   • Other     Follow up covid pneumonia    • Other     Chest tightness x 2 months / headaches / light headed / loss of appetite       Subjective:     HPI:   Hakan Murrieta is a 46 y.o. male here to discuss the evaluation and management of:      Physical deconditioning  This is a chronic and ongoing condition since September 2021 where patient was in the hospital for Covid pneumonia.  Patient continues to have Covid long-haul symptoms including chronic fatigue, shortness of breath, occasional chest pain, and left arm numbness.  He reports that after he does just minimal activities in his yard he is completely fatigued for 2 days.    Plan:  Discussed with patient referral to physical therapy for physical deconditioning due to Covid pneumonia.  Patient was agreeable to this and referral was placed today.  Patient is still awaiting to get into pulmonology Covid rehab and has scheduled appointment for April 2022.    COVID-19 long hauler  This is a chronic and ongoing condition.  Patient continues to have long Covid symptoms from previous Covid infection in September 2021.  He reports he continues to have lingering symptoms of chronic fatigue, shortness of breath with exertion, intermittent chest pain, and occasional left arm numbness.  He does indicate that overall symptoms have mildly improved since our last visit.    He reports his fatigue is worse after he goes outside and works in the yard or does something strenuous and then he is down for 2 days with severe fatigue.    He does have a home pulse oximetry device where his oxygen saturation ranges from the low to mid 90s to 98 with fluctuating heart rates from .  Patient did not check to see if his pulse was equal to the heart rate on the machine.  Education was provided about use of pulse oximetry and ensuring heart rate is equal to pulse rate.    He is currently using Advair 1 puff twice daily and albuterol once to  twice daily as needed for shortness of breath symptoms.  He does not need refills on these medications at this time.    Patient was not previously Covid vaccinated and is currently not Covid vaccinated.  Reviewed CTA ordered by previous urgent care provider with patient indicating no PEs.  Patient has follow-up appointment with Covid clinic and pulmonology in April 2022 encourage patient to call and see if they have an opening sooner.    Patient today is requesting a return to work note with restrictions.  He does indicate that he feels strong enough to return to work.    Plan:  Encourage patient to increase exercise as tolerated.  He will continue using inhalers as needed.  Encouraged him to reach out to the pulmonary Covid clinic to see if he can get an earlier appointment.  Work note was provided today to return to work with restrictions.  Encouraged adequate rest, nutrition, and at least 64 ounces of water per day.  Encourage vitamin supplements including vitamin D, zinc, and vitamin B complex.            ROS:  Per HPI    No Known Allergies    Current medicines (including changes today)  Current Outpatient Medications   Medication Sig Dispense Refill   • albuterol 108 (90 Base) MCG/ACT Aero Soln inhalation aerosol Inhale 2 Puffs every 6 hours as needed for Shortness of Breath. 8.5 g 2   • benzonatate (TESSALON) 100 MG Cap benzonatate 100 mg capsule     • fluticasone (FLONASE) 50 MCG/ACT nasal spray fluticasone propionate 50 mcg/actuation nasal spray,suspension     • montelukast (SINGULAIR) 10 MG Tab montelukast 10 mg tablet     • omeprazole (PRILOSEC) 40 MG delayed-release capsule omeprazole 40 mg capsule,delayed release     • albuterol (PROVENTIL) 2.5mg/3ml Nebu Soln solution for nebulization Take 3 mL by nebulization every four hours as needed for Shortness of Breath. 30 Each 1   • fluticasone-salmeterol (ADVAIR HFA) 115-21 MCG/ACT inhaler Inhale 1 Puff 2 times a day. 12 g 11     No current  facility-administered medications for this visit.       Social History     Tobacco Use   • Smoking status: Former Smoker     Packs/day: 0.25     Years: 10.00     Pack years: 2.50     Types: Cigars   • Smokeless tobacco: Current User     Types: Chew     Last attempt to quit: 1/1/2016   Vaping Use   • Vaping Use: Never used   Substance Use Topics   • Alcohol use: Yes     Alcohol/week: 1.8 oz     Types: 3 Cans of beer per week     Comment: weekly   • Drug use: No       Patient Active Problem List    Diagnosis Date Noted   • Physical deconditioning 01/27/2022   • COVID-19 long hauler 10/11/2021   • Elevated liver enzymes 10/11/2021   • Allergic rhinitis due to pollen 09/13/2021   • Elevated blood pressure reading without diagnosis of hypertension 09/13/2021   • Hypertriglyceridemia 09/13/2021   • Mild intermittent asthma 09/13/2021   • Obesity 09/13/2021   • Prediabetes 09/13/2021   • Reactive airway disease 09/13/2021   • Tobacco user 09/13/2021   • Depression 09/13/2021   • CAITLYN (generalized anxiety disorder) 09/13/2021   • Weight gain 09/13/2021   • Cough 09/13/2021   • Snoring 09/13/2021   • Hand numbness 09/13/2021   • Migraine headache 11/05/2015       Family History   Problem Relation Age of Onset   • No Known Problems Mother    • Suicide Attempts Father    • Diabetes Maternal Grandmother    • Dementia Maternal Grandfather    • Diabetes Paternal Grandmother    • Rheumatologic Disease Paternal Grandmother    • Heart Attack Paternal Grandfather           Objective:     Hospital Outpatient Visit on 01/04/2022   Component Date Value Ref Range Status   • Sodium 01/04/2022 141  135 - 145 mmol/L Final   • Potassium 01/04/2022 4.4  3.6 - 5.5 mmol/L Final   • Chloride 01/04/2022 108  96 - 112 mmol/L Final   • Co2 01/04/2022 21  20 - 33 mmol/L Final   • Anion Gap 01/04/2022 12.0  7.0 - 16.0 Final   • Glucose 01/04/2022 125* 65 - 99 mg/dL Final   • Bun 01/04/2022 10  8 - 22 mg/dL Final   • Creatinine 01/04/2022 0.83  0.50 -  1.40 mg/dL Final   • Calcium 01/04/2022 8.9  8.5 - 10.5 mg/dL Final   • AST(SGOT) 01/04/2022 45  12 - 45 U/L Final   • ALT(SGPT) 01/04/2022 73* 2 - 50 U/L Final   • Alkaline Phosphatase 01/04/2022 74  30 - 99 U/L Final   • Total Bilirubin 01/04/2022 0.4  0.1 - 1.5 mg/dL Final   • Albumin 01/04/2022 4.4  3.2 - 4.9 g/dL Final   • Total Protein 01/04/2022 6.6  6.0 - 8.2 g/dL Final   • Globulin 01/04/2022 2.2  1.9 - 3.5 g/dL Final   • A-G Ratio 01/04/2022 2.0  g/dL Final   • WBC 01/04/2022 8.2  4.8 - 10.8 K/uL Final   • RBC 01/04/2022 5.52  4.70 - 6.10 M/uL Final   • Hemoglobin 01/04/2022 16.5  14.0 - 18.0 g/dL Final   • Hematocrit 01/04/2022 48.7  42.0 - 52.0 % Final   • MCV 01/04/2022 88.2  81.4 - 97.8 fL Final   • MCH 01/04/2022 29.9  27.0 - 33.0 pg Final   • MCHC 01/04/2022 33.9  33.7 - 35.3 g/dL Final   • RDW 01/04/2022 44.3  35.9 - 50.0 fL Final   • Platelet Count 01/04/2022 304  164 - 446 K/uL Final   • MPV 01/04/2022 10.6  9.0 - 12.9 fL Final   • Neutrophils-Polys 01/04/2022 55.50  44.00 - 72.00 % Final   • Lymphocytes 01/04/2022 26.10  22.00 - 41.00 % Final   • Monocytes 01/04/2022 8.20  0.00 - 13.40 % Final   • Eosinophils 01/04/2022 8.70* 0.00 - 6.90 % Final   • Basophils 01/04/2022 1.30  0.00 - 1.80 % Final   • Immature Granulocytes 01/04/2022 0.20  0.00 - 0.90 % Final   • Nucleated RBC 01/04/2022 0.00  /100 WBC Final   • Neutrophils (Absolute) 01/04/2022 4.55  1.82 - 7.42 K/uL Final    Includes immature neutrophils, if present.   • Lymphs (Absolute) 01/04/2022 2.14  1.00 - 4.80 K/uL Final   • Monos (Absolute) 01/04/2022 0.67  0.00 - 0.85 K/uL Final   • Eos (Absolute) 01/04/2022 0.71* 0.00 - 0.51 K/uL Final   • Baso (Absolute) 01/04/2022 0.11  0.00 - 0.12 K/uL Final   • Immature Granulocytes (abs) 01/04/2022 0.02  0.00 - 0.11 K/uL Final   • NRBC (Absolute) 01/04/2022 0.00  K/uL Final   • Fasting Status 01/04/2022 Fasting   Final   • GFR If  01/04/2022 >60  >60 mL/min/1.73 m 2 Final   • GFR If  "Non  01/04/2022 >60  >60 mL/min/1.73 m 2 Final   Hospital Outpatient Visit on 01/04/2022   Component Date Value Ref Range Status   • Cholesterol,Tot 01/04/2022 158  100 - 199 mg/dL Final   • Triglycerides 01/04/2022 238* 0 - 149 mg/dL Final   • HDL 01/04/2022 38* >=40 mg/dL Final   • LDL 01/04/2022 72  <100 mg/dL Final   • Alkaline Phosphatase 01/04/2022 74  30 - 99 U/L Final   • AST(SGOT) 01/04/2022 46* 12 - 45 U/L Final   • ALT(SGPT) 01/04/2022 72* 2 - 50 U/L Final   • Total Bilirubin 01/04/2022 0.4  0.1 - 1.5 mg/dL Final   • Direct Bilirubin 01/04/2022 <0.2  0.1 - 0.5 mg/dL Final   • Indirect Bilirubin 01/04/2022 see below  0.0 - 1.0 mg/dL Final    Comment: Unable to calculate indirect bilirubin due to a total or direct  bilirubin result being outside the measurement range of the analyzer.     • Albumin 01/04/2022 4.3  3.2 - 4.9 g/dL Final   • Total Protein 01/04/2022 6.6  6.0 - 8.2 g/dL Final   • 25-Hydroxy   Vitamin D 25 01/04/2022 23* 30 - 100 ng/mL Final    Comment: Adult Ranges:   <20 ng/mL - Deficiency  20-29 ng/mL - Insufficiency   ng/mL - Sufficiency  Effective 3/18/2020, this electrochemiluminescence binding assay is  performed using Roche stephan e immunoassay analyzer.  The [employer] Vitamin D  total II assay is intended for the quantitative determination of total 25  hydroxyvitamin D in human serum and plasma. This assay is to be used as an  aid in the assessment of vitamin D sufficiency in adults.     • Fasting Status 01/04/2022 Fasting   Final       /88 (BP Location: Left arm, Patient Position: Sitting, BP Cuff Size: Adult)   Pulse 98   Temp 36.3 °C (97.4 °F) (Temporal)   Resp 16   Ht 1.778 m (5' 10\")   Wt (!) 128 kg (283 lb)   SpO2 97%  Body mass index is 40.61 kg/m².    Physical Exam:  Constitutional: Well-developed and well-nourished male in NAD. Not diaphoretic. No distress.   Skin: warm, dry, intact, no evidence of rash or concerning lesions  Head: Atraumatic " without lesions.  Eyes: Conjunctivae and extraocular motions are normal. Pupils are equal, round, and reactive to light. No scleral icterus.   Cardiovascular: Regular rate and rhythm without murmur. Radial pulses are intact and equal bilaterally.  Pulmonary: Clear to ausculation. Normal effort. No rales, ronchi, or wheezing.  Abdomen: Soft, non tender, and without distention. Active bowel sounds in all four quadrants.   Extremities: No cyanosis, clubbing, erythema, nor edema.   Neurological: Alert and oriented x 3. No cranial nerve deficit. 5/5 myotomes. Sensation intact.   Psychiatric:  Behavior, mood, and affect are appropriate.    CTA results from 1-  IMPRESSION:     1.  There is no CT evidence of acute pulmonary embolism.  2.  There is no evidence of pneumonia or pleural effusion.  3.  Minimal dependent groundglass opacities are likely due to atelectasis.    Assessment and Plan:     The following treatment plan was discussed:  Reviewed previous CT scan ordered by urgent care provider with patient answered all questions.    1. Physical deconditioning  - Referral to Physical Therapy    2. COVID-19 long hauler      Any change or worsening of signs or symptoms, patient encouraged to follow-up or report to emergency room for further evaluation. Patient verbalizes understanding and agrees.    Follow-Up: Return in about 4 weeks (around 2/24/2022) for COVID.      PLEASE NOTE: This dictation was created using voice recognition software. I have made every reasonable attempt to correct obvious errors, but I expect that there are errors of grammar and possibly content that I did not discover before finalizing the note.

## 2022-01-27 NOTE — ASSESSMENT & PLAN NOTE
This is a chronic and ongoing condition since September 2021 where patient was in the hospital for Covid pneumonia.  Patient continues to have Covid long-haul symptoms including chronic fatigue, shortness of breath, occasional chest pain, and left arm numbness.  He reports that after he does just minimal activities in his yard he is completely fatigued for 2 days.    Plan:  Discussed with patient referral to physical therapy for physical deconditioning due to Covid pneumonia.  Patient was agreeable to this and referral was placed today.  Patient is still awaiting to get into pulmonology Covid rehab and has scheduled appointment for April 2022.

## 2022-01-27 NOTE — LETTER
[address] [address]     January 27, 2022    Patient: Hakan Murrieta   YOB: 1975   Date of Visit: 1/27/2022       To Whom It May Concern:    Hakan Murrieta was seen and treated in our department on 1/27/2022.  Patient was diagnosed with Covid and had Covid pneumonia in September 2021.  He has needed extended time off due to long Covid symptoms.  He may return back to work with restrictions listed below  · Work our restrictions of 6 to 8 hours/day with no overtime.  · Walking restrictions of no more than 250 feet in a 15 to 30-minute time.  · Patient will need to additional 15-minute breaks  · No going up and down 1 flight of stairs twice per shift  · 50 pound weight limit no more than 10 times per shift    Sincerely,         LUIS ANTONIO Kaur.

## 2022-02-17 ENCOUNTER — OFFICE VISIT (OUTPATIENT)
Dept: MEDICAL GROUP | Facility: PHYSICIAN GROUP | Age: 47
End: 2022-02-17
Payer: MEDICAID

## 2022-02-17 VITALS
RESPIRATION RATE: 16 BRPM | WEIGHT: 285 LBS | TEMPERATURE: 97.7 F | OXYGEN SATURATION: 99 % | SYSTOLIC BLOOD PRESSURE: 130 MMHG | HEART RATE: 82 BPM | DIASTOLIC BLOOD PRESSURE: 90 MMHG | HEIGHT: 70 IN | BODY MASS INDEX: 40.8 KG/M2

## 2022-02-17 DIAGNOSIS — U09.9 COVID-19 LONG HAULER: ICD-10-CM

## 2022-02-17 PROCEDURE — 99213 OFFICE O/P EST LOW 20 MIN: CPT | Performed by: NURSE PRACTITIONER

## 2022-02-17 ASSESSMENT — FIBROSIS 4 INDEX: FIB4 SCORE: 0.78

## 2022-02-17 NOTE — LETTER
[address] [address]     February 17, 2022    Patient: Hakan Murrieta   YOB: 1975   Date of Visit: 2/17/2022       To Whom It May Concern:    Hakan Murrieta was seen and treated in our department on 2/17/2022.  He continues to have long Covid symptoms that prevent him from returning to work at this time.        Sincerely,         LUIS ANTONIO Kaur.

## 2022-02-18 NOTE — PATIENT INSTRUCTIONS
Please establish with a new primary care provider that accepts Medicaid.  Unfortunately if you continue to see me there will continue to be a charge at each appointment.

## 2022-02-18 NOTE — PROGRESS NOTES
Chief Complaint   Patient presents with   • Follow-Up       Subjective:     HPI:   Hakan Murrieta is a 46 y.o. male here to discuss the evaluation and management of:      COVID-19 long hauler  Is a chronic and ongoing condition.  Patient continues to express that he has long Covid symptoms.  Previous Covid infection was in September 2021.  Lingering symptoms include chronic fatigue, shortness of breath with exertion, intermittent chest pain, and dizziness.  He does indicate today that his symptoms are improving and he has had days occasionally without symptoms.  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    Home SPO2 readings are between 88 and 98%.  He is not on any oxygen supplementation.    He continues to use Advair 1 puff once to twice daily.  He is using his albuterol once to twice a day.  He denies needing refills on his medications at this time.    Plan  Work note was provided today.  Patient will continue with inhalers and nebulizer solution as needed.  Encourage patient to follow-up if symptoms worsen or do not improve in the next 60 days.  Encourage patient to stay well-hydrated and take adequate nutrition.        ROS:  Per HPI    No Known Allergies    Current medicines (including changes today)  Current Outpatient Medications   Medication Sig Dispense Refill   • albuterol 108 (90 Base) MCG/ACT Aero Soln inhalation aerosol Inhale 2 Puffs every 6 hours as needed for Shortness of Breath. 8.5 g 2   • benzonatate (TESSALON) 100 MG Cap benzonatate 100 mg capsule     • fluticasone (FLONASE) 50 MCG/ACT nasal spray fluticasone propionate 50 mcg/actuation nasal spray,suspension     • montelukast (SINGULAIR) 10 MG Tab montelukast 10 mg tablet     • omeprazole (PRILOSEC) 40 MG delayed-release capsule omeprazole 40 mg capsule,delayed release     • albuterol (PROVENTIL) 2.5mg/3ml Nebu Soln solution for nebulization Take 3 mL by nebulization every four hours as  "needed for Shortness of Breath. 30 Each 1   • fluticasone-salmeterol (ADVAIR HFA) 115-21 MCG/ACT inhaler Inhale 1 Puff 2 times a day. 12 g 11     No current facility-administered medications for this visit.       Social History     Tobacco Use   • Smoking status: Former Smoker     Packs/day: 0.25     Years: 10.00     Pack years: 2.50     Types: Cigars   • Smokeless tobacco: Current User     Types: Chew     Last attempt to quit: 1/1/2016   Vaping Use   • Vaping Use: Never used   Substance Use Topics   • Alcohol use: Yes     Alcohol/week: 1.8 oz     Types: 3 Cans of beer per week     Comment: weekly   • Drug use: No       Patient Active Problem List    Diagnosis Date Noted   • Physical deconditioning 01/27/2022   • COVID-19 long hauler 10/11/2021   • Elevated liver enzymes 10/11/2021   • Allergic rhinitis due to pollen 09/13/2021   • Elevated blood pressure reading without diagnosis of hypertension 09/13/2021   • Hypertriglyceridemia 09/13/2021   • Mild intermittent asthma 09/13/2021   • Obesity 09/13/2021   • Prediabetes 09/13/2021   • Reactive airway disease 09/13/2021   • Tobacco user 09/13/2021   • Depression 09/13/2021   • CAITLYN (generalized anxiety disorder) 09/13/2021   • Weight gain 09/13/2021   • Cough 09/13/2021   • Snoring 09/13/2021   • Hand numbness 09/13/2021   • Migraine headache 11/05/2015       Family History   Problem Relation Age of Onset   • No Known Problems Mother    • Suicide Attempts Father    • Diabetes Maternal Grandmother    • Dementia Maternal Grandfather    • Diabetes Paternal Grandmother    • Rheumatologic Disease Paternal Grandmother    • Heart Attack Paternal Grandfather           Objective:       /90   Pulse 82   Temp 36.5 °C (97.7 °F) (Temporal)   Resp 16   Ht 1.778 m (5' 10\")   Wt (!) 129 kg (285 lb)   SpO2 99%  Body mass index is 40.89 kg/m².    Physical Exam:  Constitutional: Well-developed and well-nourished male in NAD. Not diaphoretic. No distress.   Skin: warm, dry, " intact, no evidence of rash or concerning lesions  Cardiovascular: Regular rate and rhythm without murmur. Radial pulses are intact and equal bilaterally.  Pulmonary: Clear to ausculation. Normal effort. No rales, ronchi, or wheezing.  Extremities: No cyanosis, clubbing, erythema, nor edema.   Neurological: Alert and oriented x 3. No cranial nerve deficit. 5/5 myotomes. Sensation intact.   Psychiatric:  Behavior, mood, and affect are appropriate.    Assessment and Plan:     The following treatment plan was discussed:      1. COVID-19 long hauler      Any change or worsening of signs or symptoms, patient encouraged to follow-up or report to emergency room for further evaluation. Patient verbalizes understanding and agrees.    Follow-Up: Return if symptoms worsen or fail to improve.      PLEASE NOTE: This dictation was created using voice recognition software. I have made every reasonable attempt to correct obvious errors, but I expect that there are errors of grammar and possibly content that I did not discover before finalizing the note.

## 2022-02-18 NOTE — ASSESSMENT & PLAN NOTE
Is a chronic and ongoing condition.  Patient continues to express that he has long Covid symptoms.  Previous Covid infection was in September 2021.  Lingering symptoms include chronic fatigue, shortness of breath with exertion, intermittent chest pain, and dizziness.  He does indicate today that his symptoms are improving and he has had days occasionally without symptoms.  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    Home SPO2 readings are between 88 and 98%.  He is not on any oxygen supplementation.    He continues to use Advair 1 puff once to twice daily.  He is using his albuterol once to twice a day.  He denies needing refills on his medications at this time.    Plan  Work note was provided today.  Patient will continue with inhalers and nebulizer solution as needed.  Encourage patient to follow-up if symptoms worsen or do not improve in the next 60 days.  Encourage patient to stay well-hydrated and take adequate nutrition.

## 2022-02-24 ENCOUNTER — OFFICE VISIT (OUTPATIENT)
Dept: MEDICAL GROUP | Facility: PHYSICIAN GROUP | Age: 47
End: 2022-02-24
Payer: MEDICAID

## 2022-02-24 VITALS
DIASTOLIC BLOOD PRESSURE: 82 MMHG | OXYGEN SATURATION: 100 % | RESPIRATION RATE: 16 BRPM | SYSTOLIC BLOOD PRESSURE: 120 MMHG | TEMPERATURE: 97.3 F | HEIGHT: 70 IN | BODY MASS INDEX: 40.8 KG/M2 | HEART RATE: 80 BPM | WEIGHT: 285 LBS

## 2022-02-24 DIAGNOSIS — U09.9 COVID-19 LONG HAULER: ICD-10-CM

## 2022-02-24 DIAGNOSIS — Z02.89 ENCOUNTER FOR COMPLETION OF FORM WITH PATIENT: ICD-10-CM

## 2022-02-24 PROCEDURE — 99212 OFFICE O/P EST SF 10 MIN: CPT | Performed by: NURSE PRACTITIONER

## 2022-02-24 ASSESSMENT — FIBROSIS 4 INDEX: FIB4 SCORE: 0.78

## 2022-02-24 NOTE — ASSESSMENT & PLAN NOTE
Is a chronic and ongoing condition.  Patient continues to express that he has a long haul Covid symptoms.  His symptoms include chronic fatigue, intermittent shortness of breath with exertion, intermittent chest pain, dizziness, cough, and occasional chest mucus production.  He does report overall his symptoms continue to improve mildly each week.  He denies any fever, chills, change in chest pain or change in shortness of breath symptoms.  Patient does not currently have an incentive spirometer.  Offered prescription however he declined and indicates that he will buy one off of [employer].  I will encourage patient to get when this is possible to help with lung strengthening.    Today he is here for paperwork to be completed for [employer] work restrictions.  Before was completed with patient and all questions were answered.  Paperwork was scanned into epic and faxed to [employer] by MA.    Plan  Encourage patient to purchase incentive spirometer.  Encourage patient to follow-up with physical therapy referral.  Paperwork was filled out  Continue to encourage patient to stay well-hydrated and intake adequate amounts of healthy nutritious foods.  Encourage exercise as tolerated and to increase walking distance as tolerated.  Follow-up as needed.

## 2022-02-24 NOTE — PROGRESS NOTES
Chief Complaint   Patient presents with   • Paperwork     disability       HISTORY OF PRESENT ILLNESS: Patient is a 46 y.o. male established patient who presents today to discuss below issues      COVID-19 long hauler  Is a chronic and ongoing condition.  Patient continues to express that he has a long haul Covid symptoms.  His symptoms include chronic fatigue, intermittent shortness of breath with exertion, intermittent chest pain, dizziness, cough, and occasional chest mucus production.  He does report overall his symptoms continue to improve mildly each week.  He denies any fever, chills, change in chest pain or change in shortness of breath symptoms.  Patient does not currently have an incentive spirometer.  Offered prescription however he declined and indicates that he will buy one off of [employer].  I will encourage patient to get when this is possible to help with lung strengthening.    Today he is here for paperwork to be completed for [employer] work restrictions.  Before was completed with patient and all questions were answered.  Paperwork was scanned into epic and faxed to [employer] by MA.    Plan  Encourage patient to purchase incentive spirometer.  Encourage patient to follow-up with physical therapy referral.  Paperwork was filled out  Continue to encourage patient to stay well-hydrated and intake adequate amounts of healthy nutritious foods.  Encourage exercise as tolerated and to increase walking distance as tolerated.  Follow-up as needed.        Patient Active Problem List    Diagnosis Date Noted   • Encounter for completion of form with patient 02/24/2022   • Physical deconditioning 01/27/2022   • COVID-19 long hauler 10/11/2021   • Elevated liver enzymes 10/11/2021   • Allergic rhinitis due to pollen 09/13/2021   • Elevated blood pressure reading without diagnosis of hypertension 09/13/2021   • Hypertriglyceridemia 09/13/2021   • Mild intermittent asthma 09/13/2021   • Obesity 09/13/2021   • Prediabetes  09/13/2021   • Reactive airway disease 09/13/2021   • Tobacco user 09/13/2021   • Depression 09/13/2021   • CAITLYN (generalized anxiety disorder) 09/13/2021   • Weight gain 09/13/2021   • Cough 09/13/2021   • Snoring 09/13/2021   • Hand numbness 09/13/2021   • Migraine headache 11/05/2015      Allergies:Patient has no known allergies.    Current Outpatient Medications   Medication Sig Dispense Refill   • albuterol 108 (90 Base) MCG/ACT Aero Soln inhalation aerosol Inhale 2 Puffs every 6 hours as needed for Shortness of Breath. 8.5 g 2   • benzonatate (TESSALON) 100 MG Cap benzonatate 100 mg capsule     • fluticasone (FLONASE) 50 MCG/ACT nasal spray fluticasone propionate 50 mcg/actuation nasal spray,suspension     • montelukast (SINGULAIR) 10 MG Tab montelukast 10 mg tablet     • omeprazole (PRILOSEC) 40 MG delayed-release capsule omeprazole 40 mg capsule,delayed release     • albuterol (PROVENTIL) 2.5mg/3ml Nebu Soln solution for nebulization Take 3 mL by nebulization every four hours as needed for Shortness of Breath. 30 Each 1   • fluticasone-salmeterol (ADVAIR HFA) 115-21 MCG/ACT inhaler Inhale 1 Puff 2 times a day. 12 g 11     No current facility-administered medications for this visit.       Social History     Tobacco Use   • Smoking status: Former Smoker     Packs/day: 0.25     Years: 10.00     Pack years: 2.50     Types: Cigars   • Smokeless tobacco: Current User     Types: Chew     Last attempt to quit: 1/1/2016   Vaping Use   • Vaping Use: Never used   Substance Use Topics   • Alcohol use: Yes     Alcohol/week: 1.8 oz     Types: 3 Cans of beer per week     Comment: weekly   • Drug use: No     Social History     Social History Narrative   • Not on file       Family History   Problem Relation Age of Onset   • No Known Problems Mother    • Suicide Attempts Father    • Diabetes Maternal Grandmother    • Dementia Maternal Grandfather    • Diabetes Paternal Grandmother    • Rheumatologic Disease Paternal  "Grandmother    • Heart Attack Paternal Grandfather        ROS:  Gen: no fevers/chills, no changes in weight  Pulm: Per HPI  CV: Per HPI  GI: no nausea/vomiting, no diarrhea  Skin: no rash  Neuro: no headaches, no numbness/tingling    Exam:  /82   Pulse 80   Temp 36.3 °C (97.3 °F)   Resp 16   Ht 1.778 m (5' 10\")   Wt (!) 129 kg (285 lb)   SpO2 100%    General:  no apparent distress  Skin: no rashes in visible areas, warm, dry  Cardiovascular: Regular rate and rhythm without murmur.   Pulmonary: Clear to ausculation. No rales, ronchi, or wheezing.  Abdomen: soft non-tender, no palpable liver, spleen, bladder or masses.  Extremities: no clubbing, cyanosis, or edema.  Psych: alert and oriented x 3, judgement and memory intact    Assessment and plan      1. Encounter for completion of form with patient    2. COVID-19 long hauler      Return if symptoms worsen or fail to improve.      This dictation was created using voice recognition software. I have made reasonable attempts to correct errors, however, errors of grammar and content may exist.            "

## 2022-03-03 ENCOUNTER — TELEPHONE (OUTPATIENT)
Dept: MEDICAL GROUP | Facility: PHYSICIAN GROUP | Age: 47
End: 2022-03-03
Payer: MEDICAID

## 2022-03-03 NOTE — TELEPHONE ENCOUNTER
MEDICATION PRIOR AUTHORIZATION NEEDED:    1. Name of Medication: ALBUTEROL SULFATE     2. Requested By (Name of Pharmacy): [facility]     3. Is insurance on file current? YES    4. What is the name & phone number of the 3rd party payor?   [employer]  [id number]

## 2022-03-05 DIAGNOSIS — U09.9 COVID-19 LONG HAULER: ICD-10-CM

## 2022-03-05 RX ORDER — ALBUTEROL SULFATE 90 UG/1
2 AEROSOL, METERED RESPIRATORY (INHALATION) EVERY 6 HOURS PRN
Qty: 8.5 G | Refills: 2 | Status: SHIPPED | OUTPATIENT
Start: 2022-03-05 | End: 2022-12-13

## 2022-04-24 ASSESSMENT — ENCOUNTER SYMPTOMS
SHORTNESS OF BREATH: 1
RESPIRATORY SYMPTOMS COMMENTS: SLOWER PACE
HEMOPTYSIS: 0
CHEST TIGHTNESS: 1
WHEEZING: 1
DYSPNEA AT REST: 0

## 2022-04-25 ENCOUNTER — OFFICE VISIT (OUTPATIENT)
Dept: SLEEP MEDICINE | Facility: MEDICAL CENTER | Age: 47
End: 2022-04-25
Payer: MEDICAID

## 2022-04-25 VITALS
RESPIRATION RATE: 18 BRPM | BODY MASS INDEX: 39.37 KG/M2 | WEIGHT: 275 LBS | DIASTOLIC BLOOD PRESSURE: 88 MMHG | OXYGEN SATURATION: 95 % | HEART RATE: 88 BPM | SYSTOLIC BLOOD PRESSURE: 132 MMHG | HEIGHT: 70 IN

## 2022-04-25 DIAGNOSIS — G47.33 OSA (OBSTRUCTIVE SLEEP APNEA): ICD-10-CM

## 2022-04-25 DIAGNOSIS — R05.9 COUGH: ICD-10-CM

## 2022-04-25 DIAGNOSIS — R53.83 OTHER FATIGUE: ICD-10-CM

## 2022-04-25 DIAGNOSIS — Z86.16 HISTORY OF COVID-19: ICD-10-CM

## 2022-04-25 PROCEDURE — 99204 OFFICE O/P NEW MOD 45 MIN: CPT | Performed by: INTERNAL MEDICINE

## 2022-04-25 ASSESSMENT — ENCOUNTER SYMPTOMS
HEARTBURN: 0
PND: 0
WHEEZING: 1
VOMITING: 0
BACK PAIN: 0
FOCAL WEAKNESS: 0
STRIDOR: 0
ABDOMINAL PAIN: 0
ORTHOPNEA: 0
CHILLS: 0
COUGH: 1
MYALGIAS: 0
SINUS PAIN: 0
DIAPHORESIS: 0
WEIGHT LOSS: 0
DOUBLE VISION: 0
WEAKNESS: 0
PALPITATIONS: 0
SHORTNESS OF BREATH: 1
NAUSEA: 0
DEPRESSION: 0
DIZZINESS: 0
EYE REDNESS: 0
NECK PAIN: 0
BLURRED VISION: 0
PHOTOPHOBIA: 0
EYE DISCHARGE: 0
HEADACHES: 0
CONSTIPATION: 0
DIARRHEA: 0
SPUTUM PRODUCTION: 0
CLAUDICATION: 0
HEMOPTYSIS: 0
SPEECH CHANGE: 0
EYE PAIN: 0
FALLS: 0
SORE THROAT: 0
FEVER: 0
TREMORS: 0

## 2022-04-25 ASSESSMENT — FIBROSIS 4 INDEX: FIB4 SCORE: 0.78

## 2022-04-25 NOTE — PROGRESS NOTES
"Chief Complaint   Patient presents with   • Establish Care     Referral    • Results     CTA Chest 1/12/22       HPI: This patient is a 46 y.o. male presenting for evaluation of cough, sob and hx of covid 19. Pt is a former social smoker with les 15 pk year hx and quit 10-15 years ago. Other PMHx includes mild seasonal allergies, ALEM recently diagnosed pending initiation of CPAP therapy. He worked previously for [employer] and most recently for [employer] doing [employer]. He denies family hx of atopic or autoimmune dz. He tells me he has a long standing chronic cough that would previously occur in paroxysm. No clear triggers. Cough was often productive of mucous but difficult to expectorate except for first thing in the morning. Sputum can be green and \"fibrous.\" He contracted Covid 19 at the end of September which was associated with LLL pna, tx with regenraon and decadron. CXR improved but given ongoing SOB, CTA was performed in January which showed bibasilar atelectasis but no infiltrate or PE. No e/o occupational lung disease. He did have CBC at the same time in January notable for peripheral eosinophilia. He has been referred to PT due to ongoing fatigue after covid but this was not covered by medicaid. He was also tx with montelukast, albuterol and advair . He thinks this may have helped but he has since stopped this.     Past Medical History:   Diagnosis Date   • Apnea, sleep    • Asthma    • Back pain    • Back pain    • Chest tightness    • Chickenpox    • Cough    • Daytime sleepiness    • Depression 9/13/2021   • Diarrhea    • Dizziness    • Frequent headaches    • CAITLYN (generalized anxiety disorder) 9/13/2021   • GERD (gastroesophageal reflux disease)    • [place] measles    • Hearing difficulty    • Heartburn    • Hyperlipidemia    • Insomnia    • Migraine    • Morning headache    • Mumps    • Painful joint    • Palpitations    • Ringing in ears    • Shortness of breath    • Snoring  "   • Sore throat, chronic    • Tonsillitis    • Weakness        Social History     Socioeconomic History   • Marital status: Single     Spouse name: Not on file   • Number of children: Not on file   • Years of education: Not on file   • Highest education level: Not on file   Occupational History   • Not on file   Tobacco Use   • Smoking status: Former Smoker     Packs/day: 0.25     Years: 10.00     Pack years: 2.50     Types: Cigars   • Smokeless tobacco: Current User     Types: Chew     Last attempt to quit: 1/1/2016   Vaping Use   • Vaping Use: Never used   Substance and Sexual Activity   • Alcohol use: Yes     Alcohol/week: 1.8 oz     Types: 3 Cans of beer per week     Comment: weekly   • Drug use: No   • Sexual activity: Yes     Partners: Female     Birth control/protection: Male Sterilization   Other Topics Concern   • Not on file   Social History Narrative   • Not on file     Social Determinants of Health     Financial Resource Strain: Not on file   Food Insecurity: Not on file   Transportation Needs: Not on file   Physical Activity: Not on file   Stress: Not on file   Social Connections: Not on file   Intimate Partner Violence: Not on file   Housing Stability: Not on file       Family History   Problem Relation Age of Onset   • No Known Problems Mother    • Suicide Attempts Father    • Diabetes Maternal Grandmother    • Dementia Maternal Grandfather    • Diabetes Paternal Grandmother    • Rheumatologic Disease Paternal Grandmother    • Heart Attack Paternal Grandfather        Current Outpatient Medications on File Prior to Visit   Medication Sig Dispense Refill   • albuterol 108 (90 Base) MCG/ACT Aero Soln inhalation aerosol Inhale 2 Puffs every 6 hours as needed for Shortness of Breath. 8.5 g 2   • benzonatate (TESSALON) 100 MG Cap benzonatate 100 mg capsule     • fluticasone (FLONASE) 50 MCG/ACT nasal spray fluticasone propionate 50 mcg/actuation nasal spray,suspension     • montelukast (SINGULAIR) 10 MG Tab  "montelukast 10 mg tablet     • omeprazole (PRILOSEC) 40 MG delayed-release capsule omeprazole 40 mg capsule,delayed release     • albuterol (PROVENTIL) 2.5mg/3ml Nebu Soln solution for nebulization Take 3 mL by nebulization every four hours as needed for Shortness of Breath. 30 Each 1   • fluticasone-salmeterol (ADVAIR HFA) 115-21 MCG/ACT inhaler Inhale 1 Puff 2 times a day. 12 g 11     No current facility-administered medications on file prior to visit.       Allergies: Patient has no known allergies.    ROS:   Review of Systems   Constitutional: Negative for chills, diaphoresis, fever, malaise/fatigue and weight loss.   HENT: Negative for congestion, ear discharge, ear pain, hearing loss, nosebleeds, sinus pain, sore throat and tinnitus.    Eyes: Negative for blurred vision, double vision, photophobia, pain, discharge and redness.   Respiratory: Positive for cough, shortness of breath and wheezing. Negative for hemoptysis, sputum production and stridor.    Cardiovascular: Negative for chest pain, palpitations, orthopnea, claudication, leg swelling and PND.   Gastrointestinal: Negative for abdominal pain, constipation, diarrhea, heartburn, nausea and vomiting.   Genitourinary: Negative for dysuria and urgency.   Musculoskeletal: Negative for back pain, falls, joint pain, myalgias and neck pain.   Skin: Negative for itching and rash.   Neurological: Negative for dizziness, tremors, speech change, focal weakness, weakness and headaches.   Endo/Heme/Allergies: Negative for environmental allergies.   Psychiatric/Behavioral: Negative for depression.       /88 (BP Location: Right arm, Patient Position: Sitting, BP Cuff Size: Large adult)   Pulse 88   Resp 18   Ht 1.778 m (5' 10\")   Wt 125 kg (275 lb)   SpO2 95%     Physical Exam:  Physical Exam  Vitals reviewed.   Constitutional:       General: He is not in acute distress.     Appearance: Normal appearance. He is obese.   HENT:      Head: Normocephalic and " atraumatic.      Right Ear: External ear normal.      Left Ear: External ear normal.      Nose: Nose normal. No congestion.      Mouth/Throat:      Mouth: Mucous membranes are moist.      Pharynx: Oropharynx is clear. No oropharyngeal exudate.   Eyes:      General: No scleral icterus.     Extraocular Movements: Extraocular movements intact.      Conjunctiva/sclera: Conjunctivae normal.      Pupils: Pupils are equal, round, and reactive to light.   Cardiovascular:      Rate and Rhythm: Normal rate and regular rhythm.      Heart sounds: Normal heart sounds. No murmur heard.    No gallop.   Pulmonary:      Effort: Pulmonary effort is normal. No respiratory distress.      Breath sounds: Normal breath sounds. No wheezing or rales.   Abdominal:      Palpations: Abdomen is soft.   Musculoskeletal:         General: Normal range of motion.      Cervical back: Normal range of motion and neck supple.      Right lower leg: No edema.      Left lower leg: No edema.   Skin:     General: Skin is warm and dry.      Findings: No rash.   Neurological:      Mental Status: He is alert and oriented to person, place, and time.      Cranial Nerves: No cranial nerve deficit.   Psychiatric:         Behavior: Behavior normal.         PFTs as reviewed by me personally: none    Imaging as reviewed by me personally: as per hpi    Assessment:  1. Cough  PULMONARY FUNCTION TESTS -Test requested: Complete Pulmonary Function Test   2. ALEM (obstructive sleep apnea)     3. History of COVID-19  Referral to Other   4. Other fatigue  Referral to Other       Plan:  1. Chronic and preceded covid. I do think he could have RAD with atopic component which was worsened with covid particularly given eosinophilia. I am recommending he resumed advair HFA and prn albuterol and we will obtain PFTs.  2. Severity unknown but I encouraged pt to f/u on therapy given on going fatigue  3. Moderate and he appears to have recovered radiographically; will refer to PT to see  if there is provider that takes medicaid, tx of RAD and ALEM  4. Likely partly post-covid and partly untreated ALEM: see discussion above  Return in about 5 months (around 9/25/2022) for PFTs at time of follow up.

## 2022-04-26 ENCOUNTER — OFFICE VISIT (OUTPATIENT)
Dept: MEDICAL GROUP | Facility: PHYSICIAN GROUP | Age: 47
End: 2022-04-26

## 2022-04-26 VITALS
DIASTOLIC BLOOD PRESSURE: 84 MMHG | HEART RATE: 95 BPM | WEIGHT: 286.6 LBS | TEMPERATURE: 97.9 F | SYSTOLIC BLOOD PRESSURE: 128 MMHG | HEIGHT: 70 IN | RESPIRATION RATE: 18 BRPM | BODY MASS INDEX: 41.03 KG/M2 | OXYGEN SATURATION: 95 %

## 2022-04-26 DIAGNOSIS — Z02.89 ENCOUNTER FOR COMPLETION OF FORM WITH PATIENT: ICD-10-CM

## 2022-04-26 DIAGNOSIS — U09.9 COVID-19 LONG HAULER: ICD-10-CM

## 2022-04-26 PROCEDURE — 99213 OFFICE O/P EST LOW 20 MIN: CPT | Performed by: NURSE PRACTITIONER

## 2022-04-26 ASSESSMENT — FIBROSIS 4 INDEX: FIB4 SCORE: 0.78

## 2022-04-27 NOTE — ASSESSMENT & PLAN NOTE
Chronic and ongoing condition.  Patient does report today that he has had some improvement in symptoms of fatigue, shortness of breath with exertion, dizziness, bilateral lower chest pain and mucus.    He did follow-up with pulmonology who recommended him start back on Advair twice daily.  Patient reports that he has not needed to use his albuterol inhaler more than once or twice over the past 30 days.    He is requesting paperwork be completed for unemployment today.    Plan  Discussed with patient the importance of following recommendations as laid out by pulmonology.  Encourage patient to use Advair inhaler twice daily and as needed albuterol.  Unemployed paperwork was filled out.

## 2022-04-27 NOTE — PROGRESS NOTES
No chief complaint on file.      Subjective:     HPI:   Hakan Murrieta is a 46 y.o. male here to discuss the evaluation and management of:      Encounter for completion of form with patient  Patient is here today for completion of unemployment paperwork.  Patient reports that he was let go of his job due to their inability to comply with restrictions given to him due to long haul COVID symptoms.    Forms are completed in the presence of the patient and all questions were answered.  Forms are scanned into epic.    COVID-19 long hauler  Chronic and ongoing condition.  Patient does report today that he has had some improvement in symptoms of fatigue, shortness of breath with exertion, dizziness, bilateral lower chest pain and mucus.    He did follow-up with pulmonology who recommended him start back on Advair twice daily.  Patient reports that he has not needed to use his albuterol inhaler more than once or twice over the past 30 days.    He is requesting paperwork be completed for unemployment today.    Plan  Discussed with patient the importance of following recommendations as laid out by pulmonology.  Encourage patient to use Advair inhaler twice daily and as needed albuterol.  Unemployed paperwork was filled out.        ROS:  Gen: no fevers/chills, no changes in weight  Pulm: Positive per HPI   CV: Positive per HP  GI: no nausea/vomiting, no diarrhea  MSk: no myalgias  Neuro: no headaches, no numbness/tingling    No Known Allergies    Current medicines (including changes today)  Current Outpatient Medications   Medication Sig Dispense Refill   • albuterol 108 (90 Base) MCG/ACT Aero Soln inhalation aerosol Inhale 2 Puffs every 6 hours as needed for Shortness of Breath. 8.5 g 2   • benzonatate (TESSALON) 100 MG Cap benzonatate 100 mg capsule     • fluticasone (FLONASE) 50 MCG/ACT nasal spray fluticasone propionate 50 mcg/actuation nasal spray,suspension     • montelukast (SINGULAIR) 10 MG Tab montelukast 10 mg  "tablet     • omeprazole (PRILOSEC) 40 MG delayed-release capsule omeprazole 40 mg capsule,delayed release     • albuterol (PROVENTIL) 2.5mg/3ml Nebu Soln solution for nebulization Take 3 mL by nebulization every four hours as needed for Shortness of Breath. 30 Each 1   • fluticasone-salmeterol (ADVAIR HFA) 115-21 MCG/ACT inhaler Inhale 1 Puff 2 times a day. 12 g 11     No current facility-administered medications for this visit.          Objective:       /84   Pulse 95   Temp 36.6 °C (97.9 °F)   Resp 18   Ht 1.778 m (5' 10\")   Wt (!) 130 kg (286 lb 9.6 oz)   SpO2 95%  Body mass index is 41.12 kg/m².    Physical Exam:  Constitutional: Well-developed and well-nourished male in NAD. Not diaphoretic. No distress.   Skin: warm, dry, intact  Cardiovascular: Regular rate and rhythm without murmur. Radial pulses are intact and equal bilaterally.  Pulmonary: Clear but diminished to ausculation. Normal effort. No rales, ronchi, or wheezing.    Neurological: Alert and oriented x 3.   Psychiatric:  Behavior, mood, and affect are appropriate.    Assessment and Plan:     The following treatment plan was discussed:  Reviewed pulmonology report and discussed plan of care with patient.  Patient did receive a new CPAP.  Encourage patient to start on CPAP tonight.  Encourage patient to continue using Advair inhaler and as needed albuterol.      1. Encounter for completion of form with patient    2. COVID-19 long hauler      Any change or worsening of signs or symptoms, patient encouraged to follow-up or report to urgent care or emergency room for further evaluation. Patient verbalizes understanding and agrees.    Follow-Up: Return if symptoms worsen or fail to improve.      PLEASE NOTE: This dictation was created using voice recognition software. I have made every reasonable attempt to correct obvious errors, but I expect that there are errors of grammar and possibly content that I did not discover before finalizing the " note.

## 2022-04-27 NOTE — ASSESSMENT & PLAN NOTE
Patient is here today for completion of unemployment paperwork.  Patient reports that he was let go of his job due to their inability to comply with restrictions given to him due to long haul COVID symptoms.    Forms are completed in the presence of the patient and all questions were answered.  Forms are scanned into epic.

## 2022-05-23 ENCOUNTER — NON-PROVIDER VISIT (OUTPATIENT)
Dept: SLEEP MEDICINE | Facility: MEDICAL CENTER | Age: 47
End: 2022-05-23
Attending: INTERNAL MEDICINE
Payer: MEDICAID

## 2022-05-23 VITALS — HEIGHT: 70 IN | WEIGHT: 290 LBS | BODY MASS INDEX: 41.52 KG/M2

## 2022-05-23 DIAGNOSIS — R05.9 COUGH: ICD-10-CM

## 2022-05-23 PROCEDURE — 94060 EVALUATION OF WHEEZING: CPT | Performed by: INTERNAL MEDICINE

## 2022-05-23 PROCEDURE — 94726 PLETHYSMOGRAPHY LUNG VOLUMES: CPT | Performed by: INTERNAL MEDICINE

## 2022-05-23 PROCEDURE — 94729 DIFFUSING CAPACITY: CPT | Performed by: INTERNAL MEDICINE

## 2022-05-23 ASSESSMENT — PULMONARY FUNCTION TESTS
FEV1_PREDICTED: 4.08
FEV1_PERCENT_PREDICTED: 97
FVC_PREDICTED: 5.13
FEV1: 3.98
FEV1/FVC_PREDICTED: 80
FEV1/FVC_PERCENT_PREDICTED: 105
FEV1/FVC: 84
FEV1/FVC_PERCENT_PREDICTED: 80
FVC: 4.58
FEV1/FVC: 84
FEV1_PERCENT_CHANGE: -3
FEV1_PERCENT_CHANGE: 0
FEV1/FVC_PERCENT_PREDICTED: 109
FVC: 4.75
FEV1: 3.97
FEV1/FVC_PERCENT_PREDICTED: 104
FEV1_PERCENT_PREDICTED: 97
FVC_PERCENT_PREDICTED: 89
FEV1/FVC_PERCENT_LLN: 67
FVC_LLN: 4.28
FEV1_LLN: 3.41
FEV1/FVC_PERCENT_CHANGE: 0
FEV1/FVC_PERCENT_CHANGE: 3
FEV1/FVC: 86.68
FEV1/FVC: 87
FVC_PERCENT_PREDICTED: 92
FEV1/FVC_PERCENT_PREDICTED: 108

## 2022-05-23 ASSESSMENT — FIBROSIS 4 INDEX: FIB4 SCORE: 0.78

## 2022-05-23 NOTE — PROCEDURES
Technician: Ene Berry RRT, CPFT  Good patient effort & cooperation.  The results of this test meet the ATS/ERS standards for acceptability & reproducibility.  Test was performed on the [employer] Body Plethysmograph-Elite DX system.  Predicted equations for Spirometry are GLI-2012, ITS for lung volumes, and GLI-2017 for DLCO.  The DLCO was uncorrected for Hgb.  A bronchodilator of Ventolin HFA -2puffs via spacer administered.  DLCO performed during dilation period. IVC is less than 90%.    Interpretation;   Baseline spirometry shows normal airflows.  No significant bronchodilator response.  Lung volumes are within normal limits.  Diffusion capacity is within normal limits.  Normal pulmonary function testing with normal flow volume loop.

## 2022-06-21 ENCOUNTER — NON-PROVIDER VISIT (OUTPATIENT)
Dept: URGENT CARE | Facility: PHYSICIAN GROUP | Age: 47
End: 2022-06-21

## 2022-06-21 DIAGNOSIS — Z02.1 PRE-EMPLOYMENT DRUG TESTING: ICD-10-CM

## 2022-06-21 PROCEDURE — 8907 PR URINE COLLECT ONLY: Performed by: STUDENT IN AN ORGANIZED HEALTH CARE EDUCATION/TRAINING PROGRAM

## 2022-09-27 ENCOUNTER — APPOINTMENT (OUTPATIENT)
Dept: SLEEP MEDICINE | Facility: MEDICAL CENTER | Age: 47
End: 2022-09-27
Payer: MEDICAID

## 2022-11-11 ENCOUNTER — OFFICE VISIT (OUTPATIENT)
Dept: URGENT CARE | Facility: PHYSICIAN GROUP | Age: 47
End: 2022-11-11
Payer: MEDICAID

## 2022-11-11 VITALS
DIASTOLIC BLOOD PRESSURE: 88 MMHG | SYSTOLIC BLOOD PRESSURE: 120 MMHG | HEIGHT: 70 IN | OXYGEN SATURATION: 97 % | RESPIRATION RATE: 14 BRPM | BODY MASS INDEX: 38.22 KG/M2 | TEMPERATURE: 98.8 F | HEART RATE: 88 BPM | WEIGHT: 267 LBS

## 2022-11-11 DIAGNOSIS — J98.8 RESPIRATORY TRACT INFECTION: ICD-10-CM

## 2022-11-11 DIAGNOSIS — R05.9 COUGH, UNSPECIFIED TYPE: ICD-10-CM

## 2022-11-11 DIAGNOSIS — J02.9 SORE THROAT: ICD-10-CM

## 2022-11-11 LAB
INT CON NEG: NORMAL
INT CON POS: NORMAL
S PYO AG THROAT QL: NORMAL

## 2022-11-11 PROCEDURE — 87880 STREP A ASSAY W/OPTIC: CPT | Performed by: PHYSICIAN ASSISTANT

## 2022-11-11 PROCEDURE — 99213 OFFICE O/P EST LOW 20 MIN: CPT | Performed by: PHYSICIAN ASSISTANT

## 2022-11-11 RX ORDER — AMOXICILLIN AND CLAVULANATE POTASSIUM 875; 125 MG/1; MG/1
1 TABLET, FILM COATED ORAL 2 TIMES DAILY
Qty: 20 TABLET | Refills: 0 | Status: SHIPPED | OUTPATIENT
Start: 2022-11-11 | End: 2022-11-21

## 2022-11-11 ASSESSMENT — ENCOUNTER SYMPTOMS
VOMITING: 0
FEVER: 1
SORE THROAT: 1
COUGH: 1
DIARRHEA: 0
SHORTNESS OF BREATH: 1
EYE REDNESS: 0
NAUSEA: 1
MYALGIAS: 1
EYE DISCHARGE: 0
WHEEZING: 1
HEADACHES: 1

## 2022-11-11 ASSESSMENT — FIBROSIS 4 INDEX: FIB4 SCORE: 0.8

## 2022-11-11 NOTE — PROGRESS NOTES
Subjective     Hakan Murrieta is a 47 y.o. male who presents with Shortness of Breath (Cough, SOB, Sore Throat, Congestion. Pt states Sx started about 2 weeks ago, sore throat started approximately 2-3 days ago. )          Cough  This is a new problem. Episode onset: x 2.5 weeks ago. The problem has been unchanged. The cough is Non-productive. Associated symptoms include ear congestion, a fever (The patient reports an associated intermittent low-grade fever.), headaches, myalgias, nasal congestion, a sore throat, shortness of breath (The patient reports intermittent shortness of breath.) and wheezing (The patient reports intermittent wheezing.). Pertinent negatives include no chest pain, ear pain or eye redness. He has tried OTC cough suppressant (and IBU) for the symptoms.     The patient states his family has been sick with several viral-like illnesses.  The patient states he has been fighting a cold for the past several weeks.  Additionally, the patient states he was recently sick with a GI illness x1 week ago.  Lastly, the patient states his children were recently diagnosed with strep pharyngitis.  The patient reports no known exposure to COVID-19.  The patient states he took an at-home COVID-19 test at the onset of symptoms, which was negative.    PMH:  has a past medical history of Apnea, sleep, Asthma, Back pain, Back pain, Chest tightness, Chickenpox, Cough, Daytime sleepiness, Depression (9/13/2021), Diarrhea, Dizziness, Frequent headaches, CAITLYN (generalized anxiety disorder) (9/13/2021), GERD (gastroesophageal reflux disease), [place] measles, Hearing difficulty, Heartburn, Hyperlipidemia, Insomnia, Migraine, Morning headache, Mumps, Painful joint, Palpitations, Ringing in ears, Shortness of breath, Snoring, Sore throat, chronic, Tonsillitis, and Weakness.    He has no past medical history of Anemia, Arrhythmia, Arthritis, Blood transfusion without reported diagnosis, Cancer (HCC), CHF (congestive heart  failure) ([facility]), Clotting disorder ([facility]), COPD (chronic obstructive pulmonary disease) ([facility]), Diabetes ([facility]), Glaucoma, Heart attack ([facility]), Heart murmur, HIV (human immunodeficiency virus infection) ([facility]), Hypertension, Kidney disease, Seizure ([facility]), Stroke ([facility]), Substance abuse ([facility]), or Thyroid disease.  MEDS:   Current Outpatient Medications:     albuterol 108 (90 Base) MCG/ACT Aero Soln inhalation aerosol, Inhale 2 Puffs every 6 hours as needed for Shortness of Breath. (Patient not taking: Reported on 11/11/2022), Disp: 8.5 g, Rfl: 2    benzonatate (TESSALON) 100 MG Cap, benzonatate 100 mg capsule (Patient not taking: Reported on 11/11/2022), Disp: , Rfl:     fluticasone (FLONASE) 50 MCG/ACT nasal spray, fluticasone propionate 50 mcg/actuation nasal spray,suspension (Patient not taking: Reported on 11/11/2022), Disp: , Rfl:     montelukast (SINGULAIR) 10 MG Tab, montelukast 10 mg tablet (Patient not taking: Reported on 11/11/2022), Disp: , Rfl:     omeprazole (PRILOSEC) 40 MG delayed-release capsule, omeprazole 40 mg capsule,delayed release (Patient not taking: Reported on 11/11/2022), Disp: , Rfl:     albuterol (PROVENTIL) 2.5mg/3ml Nebu Soln solution for nebulization, Take 3 mL by nebulization every four hours as needed for Shortness of Breath. (Patient not taking: Reported on 11/11/2022), Disp: 30 Each, Rfl: 1    fluticasone-salmeterol (ADVAIR HFA) 115-21 MCG/ACT inhaler, Inhale 1 Puff 2 times a day. (Patient not taking: Reported on 11/11/2022), Disp: 12 g, Rfl: 11  ALLERGIES: No Known Allergies  SURGHX:   Past Surgical History:   Procedure Laterality Date    CHOLECYSTECTOMY      INGUINAL HERNIA REPAIR CHILD Right     TONSILLECTOMY AND ADENOIDECTOMY      VASECTOMY       SOCHX:  reports that he has quit smoking. His smoking use included cigars. He has a 2.50 pack-year smoking history. His smokeless tobacco use includes chew. He reports current alcohol use of about 1.8 oz per week. He reports that he does not  "use drugs.  FH: Family history was reviewed, no pertinent findings to report    Review of Systems   Constitutional:  Positive for fever (The patient reports an associated intermittent low-grade fever.).   HENT:  Positive for congestion and sore throat. Negative for ear pain.    Eyes:  Negative for discharge and redness.   Respiratory:  Positive for cough, shortness of breath (The patient reports intermittent shortness of breath.) and wheezing (The patient reports intermittent wheezing.).    Cardiovascular:  Negative for chest pain and leg swelling.   Gastrointestinal:  Positive for nausea (The patient reports associated nausea.  The patient states he was recently sick with a GI illness x1 week ago with associated vomiting and diarrhea.  Patient states the GI illness has since resolved.). Negative for diarrhea and vomiting.   Musculoskeletal:  Positive for myalgias.   Neurological:  Positive for headaches.            Objective     /88   Pulse 88   Temp 37.1 °C (98.8 °F) (Temporal)   Resp 14   Ht 1.778 m (5' 10\")   Wt 121 kg (267 lb)   SpO2 97%   BMI 38.31 kg/m²      Physical Exam  Constitutional:       Appearance: Normal appearance.   HENT:      Head: Normocephalic and atraumatic.      Right Ear: Tympanic membrane, ear canal and external ear normal.      Left Ear: Tympanic membrane, ear canal and external ear normal.      Nose: Nose normal.      Mouth/Throat:      Mouth: Mucous membranes are moist.      Pharynx: Oropharynx is clear. No posterior oropharyngeal erythema.   Eyes:      Extraocular Movements: Extraocular movements intact.      Conjunctiva/sclera: Conjunctivae normal.   Cardiovascular:      Rate and Rhythm: Normal rate and regular rhythm.      Heart sounds: Normal heart sounds.   Pulmonary:      Effort: Pulmonary effort is normal. No respiratory distress.      Breath sounds: Normal breath sounds. No wheezing.   Musculoskeletal:         General: Normal range of motion.      Cervical back: " Normal range of motion and neck supple.   Skin:     General: Skin is warm and dry.   Neurological:      Mental Status: He is alert and oriented to person, place, and time.             Progress:  POCT Rapid Strep: NEGATIVE               Assessment & Plan          1. Respiratory tract infection  - amoxicillin-clavulanate (AUGMENTIN) 875-125 MG Tab; Take 1 Tablet by mouth 2 times a day for 10 days.  Dispense: 20 Tablet; Refill: 0    2. Cough, unspecified type    3. Sore throat  - POCT Rapid Strep A    The patient's presenting symptoms and physical exam findings are consistent with an acute respiratory tract infection with associated cough.  The patient is also experiencing a sore throat.  The patient's physical exam today in clinic was normal.  The patient's lungs were clear to auscultation without wheezing or rhonchi, and his pulse ox was within normal limits.  The patient is nontoxic and appears in no acute distress.  The patient's vital signs are stable and within normal limits.  He is afebrile today in clinic.  The patient's POCT rapid strep test today in clinic was negative.  Given the prolonged duration of the patient's symptoms, will prescribe the patient Augmentin to cover for a likely respiratory tract infection.  Informed the patient this will also cover for possible strep pharyngitis given his recent exposure.  Advised the patient to monitor for worsening signs or symptoms.  Recommend OTC medications and supportive care for symptomatic management.  Recommend patient follow-up with PCP as needed.  Discussed return precautions with patient, and he verbalized understanding.    OTC Tylenol or Motrin for fever/discomfort.  OTC cough/cold medication for symptomatic relief  OTC Supportive Care for Congestion - saline nasal spray or neti pot  OTC Supportive Care for Sore Throat - warm salt water gargles, sore throat lozenges, warm lemon water, and/or tea.  Drink plenty of fluids  Follow-up with PCP  Return to clinic  or go to the ED if symptoms worsen or fail to improve, or if the patient should develop worsening/increasing cough, congestion, ear pain, sore throat, shortness of breath, wheezing, chest pain, fever/chills, and/or any concerning symptoms.    Discussed plan with the patient, and he agrees to the above.    I personally reviewed prior external notes and test results pertinent to today's visit.  I have independently reviewed and interpreted all diagnostics ordered during this urgent care visit.     Please note that this dictation was created using voice recognition software. I have made every reasonable attempt to correct obvious errors, but I expect that there may be errors of grammar and possibly content that I did not discover before finalizing the note.     This note was electronically signed by Vickie Carlos PA-C

## 2022-12-13 ENCOUNTER — OFFICE VISIT (OUTPATIENT)
Dept: URGENT CARE | Facility: PHYSICIAN GROUP | Age: 47
End: 2022-12-13
Payer: MEDICAID

## 2022-12-13 VITALS
HEIGHT: 70 IN | SYSTOLIC BLOOD PRESSURE: 128 MMHG | DIASTOLIC BLOOD PRESSURE: 80 MMHG | WEIGHT: 276 LBS | RESPIRATION RATE: 16 BRPM | HEART RATE: 85 BPM | OXYGEN SATURATION: 96 % | TEMPERATURE: 96.9 F | BODY MASS INDEX: 39.51 KG/M2

## 2022-12-13 DIAGNOSIS — R07.89 CHEST PRESSURE: ICD-10-CM

## 2022-12-13 PROCEDURE — 99215 OFFICE O/P EST HI 40 MIN: CPT | Performed by: NURSE PRACTITIONER

## 2022-12-13 PROCEDURE — 93000 ELECTROCARDIOGRAM COMPLETE: CPT | Performed by: NURSE PRACTITIONER

## 2022-12-13 ASSESSMENT — FIBROSIS 4 INDEX: FIB4 SCORE: 0.8

## 2022-12-13 NOTE — PROGRESS NOTES
Hakan Murrieta is a 47 y.o. male who presents for Shortness of Breath, Body Aches (Fatigue. Off and on for a few month. ), Chest Pain (X 1 day. Numbness and tingling in extremities. Feet and hands. /Hurts when taking breaths. Feels pressure in chest. Pain is in the center of chest. ), and Chills      HPI  This is a new problem. Hakan Murrieta is a 47 y.o. patient who presents to urgent care with c/o: Sudden on set chest wall discomfort yesterday with nausea associated with numbness and tingling in his extremities.  The nausea improved but the chest wall discomfort has persisted.  He rates it 3/10.  It hurts when he takes a deep breath then.  Pain starts on the left chest and radiates over to the right.  He also notices as he is just sitting there quietly.  He was not awoken during the night with chest discomfort but when he did wake up he did feel it.  Denies previous history of cardiac disease.  Denies family history of early onset cardiac disease.  No recent illness. Denies unusual activities. Treatments tried none.  No other aggravating or alleviating factors.       ROS See HPI    Allergies:     No Known Allergies    PMSFS Hx:  Past Medical History:   Diagnosis Date    Apnea, sleep     Asthma     Back pain     Back pain     Chest tightness     Chickenpox     Cough     Daytime sleepiness     Depression 9/13/2021    Diarrhea     Dizziness     Frequent headaches     CAITLYN (generalized anxiety disorder) 9/13/2021    GERD (gastroesophageal reflux disease)     Chinese measles     Hearing difficulty     Heartburn     Hyperlipidemia     Insomnia     Migraine     Morning headache     Mumps     Painful joint     Palpitations     Ringing in ears     Shortness of breath     Snoring     Sore throat, chronic     Tonsillitis     Weakness      Past Surgical History:   Procedure Laterality Date    CHOLECYSTECTOMY      INGUINAL HERNIA REPAIR CHILD Right     TONSILLECTOMY AND ADENOIDECTOMY      VASECTOMY       Family History   Problem  "Relation Age of Onset    No Known Problems Mother     Suicide Attempts Father     Diabetes Maternal Grandmother     Dementia Maternal Grandfather     Diabetes Paternal Grandmother     Rheumatologic Disease Paternal Grandmother     Heart Attack Paternal Grandfather      Social History     Tobacco Use    Smoking status: Former     Packs/day: 0.25     Years: 10.00     Pack years: 2.50     Types: Cigars, Cigarettes    Smokeless tobacco: Current     Types: Chew     Last attempt to quit: 1/1/2016   Substance Use Topics    Alcohol use: Yes     Alcohol/week: 1.8 oz     Types: 3 Cans of beer per week     Comment: weekly       Problems:   Patient Active Problem List   Diagnosis    Allergic rhinitis due to pollen    Elevated blood pressure reading without diagnosis of hypertension    Hypertriglyceridemia    Mild intermittent asthma    Obesity    Prediabetes    Reactive airway disease    Tobacco user    Depression    CAITLYN (generalized anxiety disorder)    Weight gain    Cough    Snoring    Hand numbness    COVID-19 long hauler    Elevated liver enzymes    Migraine headache    Physical deconditioning    Encounter for completion of form with patient       Medications:   Current Outpatient Medications on File Prior to Visit   Medication Sig Dispense Refill    albuterol (PROVENTIL) 2.5mg/3ml Nebu Soln solution for nebulization Take 3 mL by nebulization every four hours as needed for Shortness of Breath. (Patient not taking: Reported on 11/11/2022) 30 Each 1     No current facility-administered medications on file prior to visit.          Objective:     /80   Pulse 85   Temp 36.1 °C (96.9 °F) (Temporal)   Resp 16   Ht 1.778 m (5' 10\")   Wt (!) 125 kg (276 lb)   SpO2 96%   BMI 39.60 kg/m²     Physical Exam  Vitals and nursing note reviewed.   Constitutional:       General: He is not in acute distress.     Appearance: Normal appearance. He is normal weight. He is not ill-appearing.   Cardiovascular:      Rate and Rhythm: " Normal rate and regular rhythm.      Pulses: Normal pulses.      Heart sounds: Normal heart sounds.   Pulmonary:      Effort: Pulmonary effort is normal.      Breath sounds: Normal breath sounds.   Chest:      Chest wall: No tenderness (non- TTP).   Skin:     General: Skin is warm.      Capillary Refill: Capillary refill takes less than 2 seconds.   Neurological:      Mental Status: He is alert and oriented to person, place, and time.   Psychiatric:         Mood and Affect: Mood normal.         Behavior: Behavior normal.         Thought Content: Thought content normal.     EKG Interpretation no comparison ECG    Rhythm: normal sinus   Rate: normal   Axis: normal  Ectopy: none  Conduction: normal  ST Segments: no acute change  T Waves: non specific changes  Q Waves: inferior leads    Clinical Impression: non-specific EKG      Assessment /Associated Orders:      1. Chest pressure  EKG - Clinic Performed          Medical Decision Making:    Pt's clinical presentation and exam today indicate a need for higher level of care with further evaluation and/or diagnostics. Pt's presentation of onset of nausea followed by persistent chest pain is concerning for possible CAD, ischemia.  No comparable EKGs in his chart   Pt care transferred to higher level of care in ER at [facility] ( pt requested facility)   Pt is to be transported via POV with his spouse. Declines ambulance transport. If his pain worsens he will pull over and call 911 for ambulance assistance/ transport.   I have reiterated to patient that although an Urgent Care to ER transfer was made this will not necessarily expedite the ER process.      Please note that this dictation was created using voice recognition software. I have worked with consultants from the vendor as well as technical experts from [facility] to optimize the interface. I have made every reasonable attempt to correct obvious errors, but I expect that there are errors of  grammar and possibly content that I did not discover before finalizing the note.  This note was electronically signed by provider

## 2022-12-14 ENCOUNTER — HOSPITAL ENCOUNTER (OUTPATIENT)
Facility: MEDICAL CENTER | Age: 47
End: 2022-12-14
Attending: NURSE PRACTITIONER
Payer: MEDICAID

## 2022-12-14 ENCOUNTER — OFFICE VISIT (OUTPATIENT)
Dept: URGENT CARE | Facility: PHYSICIAN GROUP | Age: 47
End: 2022-12-14
Payer: MEDICAID

## 2022-12-14 VITALS
BODY MASS INDEX: 39.6 KG/M2 | HEART RATE: 78 BPM | OXYGEN SATURATION: 98 % | TEMPERATURE: 97.2 F | DIASTOLIC BLOOD PRESSURE: 90 MMHG | HEIGHT: 70 IN | RESPIRATION RATE: 18 BRPM | SYSTOLIC BLOOD PRESSURE: 132 MMHG | WEIGHT: 276.6 LBS

## 2022-12-14 DIAGNOSIS — R68.89 FLU-LIKE SYMPTOMS: ICD-10-CM

## 2022-12-14 DIAGNOSIS — Z20.822 SUSPECTED COVID-19 VIRUS INFECTION: ICD-10-CM

## 2022-12-14 DIAGNOSIS — J06.9 URI WITH COUGH AND CONGESTION: ICD-10-CM

## 2022-12-14 DIAGNOSIS — J06.9 URI WITH COUGH AND CONGESTION: Primary | ICD-10-CM

## 2022-12-14 LAB
EXTERNAL QUALITY CONTROL: NORMAL
FLUAV+FLUBV AG SPEC QL IA: NORMAL
INT CON NEG: NORMAL
INT CON NEG: NORMAL
INT CON POS: NORMAL
INT CON POS: NORMAL
SARS-COV+SARS-COV-2 AG RESP QL IA.RAPID: NEGATIVE

## 2022-12-14 PROCEDURE — 0240U HCHG SARS-COV-2 COVID-19 NFCT DS RESP RNA 3 TRGT MIC: CPT

## 2022-12-14 PROCEDURE — 99213 OFFICE O/P EST LOW 20 MIN: CPT | Mod: CS | Performed by: NURSE PRACTITIONER

## 2022-12-14 PROCEDURE — 87426 SARSCOV CORONAVIRUS AG IA: CPT | Performed by: NURSE PRACTITIONER

## 2022-12-14 PROCEDURE — 87804 INFLUENZA ASSAY W/OPTIC: CPT | Performed by: NURSE PRACTITIONER

## 2022-12-14 RX ORDER — METHYLPREDNISOLONE 4 MG/1
TABLET ORAL
Qty: 21 TABLET | Refills: 0 | Status: SHIPPED | OUTPATIENT
Start: 2022-12-14 | End: 2023-06-14

## 2022-12-14 RX ORDER — DEXTROMETHORPHAN HYDROBROMIDE AND PROMETHAZINE HYDROCHLORIDE 15; 6.25 MG/5ML; MG/5ML
5 SYRUP ORAL EVERY 4 HOURS PRN
Qty: 120 ML | Refills: 0 | Status: SHIPPED | OUTPATIENT
Start: 2022-12-14 | End: 2022-12-21

## 2022-12-14 ASSESSMENT — ENCOUNTER SYMPTOMS
FEVER: 0
COUGH: 1
NAUSEA: 1
VOMITING: 0
SORE THROAT: 1
SPUTUM PRODUCTION: 0
CHILLS: 1
SHORTNESS OF BREATH: 1

## 2022-12-14 ASSESSMENT — FIBROSIS 4 INDEX: FIB4 SCORE: 0.8

## 2022-12-14 ASSESSMENT — COPD QUESTIONNAIRES: COPD: 1

## 2022-12-14 NOTE — PROGRESS NOTES
Subjective:     Hakan Murrieta is a 47 y.o. male who presents for Shortness of Breath (Pt went to ER last night and was told there was nothing wrong. Chest x ray and blood came back fine), Chest Pain, Pharyngitis (Started this morning, and also lightheadedness ), and Nausea (X 2 days ago)      Shortness of Breath  This is a new problem. The current episode started in the past 7 days (Hakan comes to the clinic with complaitns of 3 days of flu like sympotms. He was seen in the  yesterday and sent to the ER for chest pain. His ER workup was negative. No viral testing was performed in urgent care or ER. His entire family is ill.). The problem has been gradually worsening. Associated symptoms include chest pain (From cough) and a sore throat. Pertinent negatives include no fever, sputum production or vomiting. He has tried rest (motrin, albuterol) for the symptoms. The treatment provided mild relief. His past medical history is significant for COPD and pneumonia. There is no history of asthma.   Chest Pain   Associated symptoms include a cough, malaise/fatigue, nausea and shortness of breath. Pertinent negatives include no fever, sputum production or vomiting. Past medical history comments: He does suffer from a chronic cough   Pharyngitis   Associated symptoms include congestion, coughing and shortness of breath. Pertinent negatives include no vomiting.   Nausea  Associated symptoms include chest pain (From cough), chills, congestion, coughing, nausea and a sore throat. Pertinent negatives include no fever or vomiting.       Review of Systems   Constitutional:  Positive for chills and malaise/fatigue. Negative for fever.   HENT:  Positive for congestion and sore throat.    Respiratory:  Positive for cough and shortness of breath. Negative for sputum production.    Cardiovascular:  Positive for chest pain (From cough).   Gastrointestinal:  Positive for nausea. Negative for vomiting.     PMH:   Past Medical History:  "  Diagnosis Date   • Apnea, sleep    • Asthma    • Back pain    • Back pain    • Chest tightness    • Chickenpox    • Cough    • Daytime sleepiness    • Depression 9/13/2021   • Diarrhea    • Dizziness    • Frequent headaches    • CAITLYN (generalized anxiety disorder) 9/13/2021   • GERD (gastroesophageal reflux disease)    • Romansh measles    • Hearing difficulty    • Heartburn    • Hyperlipidemia    • Insomnia    • Migraine    • Morning headache    • Mumps    • Painful joint    • Palpitations    • Ringing in ears    • Shortness of breath    • Snoring    • Sore throat, chronic    • Tonsillitis    • Weakness      ALLERGIES: No Known Allergies  SURGHX:   Past Surgical History:   Procedure Laterality Date   • CHOLECYSTECTOMY     • INGUINAL HERNIA REPAIR CHILD Right    • TONSILLECTOMY AND ADENOIDECTOMY     • VASECTOMY       SOCHX:   Social History     Socioeconomic History   • Marital status:    Tobacco Use   • Smoking status: Former     Packs/day: 0.25     Years: 10.00     Pack years: 2.50     Types: Cigars, Cigarettes   • Smokeless tobacco: Current     Types: Chew     Last attempt to quit: 1/1/2016   Vaping Use   • Vaping Use: Never used   Substance and Sexual Activity   • Alcohol use: Yes     Alcohol/week: 1.8 oz     Types: 3 Cans of beer per week     Comment: weekly   • Drug use: No   • Sexual activity: Yes     Partners: Female     Birth control/protection: Male Sterilization     FH:   Family History   Problem Relation Age of Onset   • No Known Problems Mother    • Suicide Attempts Father    • Diabetes Maternal Grandmother    • Dementia Maternal Grandfather    • Diabetes Paternal Grandmother    • Rheumatologic Disease Paternal Grandmother    • Heart Attack Paternal Grandfather          Objective:   BP (!) 132/90   Pulse 78   Temp 36.2 °C (97.2 °F) (Temporal)   Resp 18   Ht 1.778 m (5' 10\")   Wt (!) 125 kg (276 lb 9.6 oz)   SpO2 98%   BMI 39.69 kg/m²     Physical Exam  Constitutional:       Appearance: He " is ill-appearing.   HENT:      Head: Normocephalic and atraumatic.      Right Ear: Tympanic membrane, ear canal and external ear normal.      Left Ear: Tympanic membrane, ear canal and external ear normal.      Nose: Congestion and rhinorrhea present.      Mouth/Throat:      Mouth: Mucous membranes are moist.      Pharynx: Posterior oropharyngeal erythema present. No oropharyngeal exudate.      Comments: Tonsils are surgically absent  Eyes:      General:         Right eye: No discharge.         Left eye: No discharge.      Extraocular Movements: Extraocular movements intact.      Conjunctiva/sclera: Conjunctivae normal.      Pupils: Pupils are equal, round, and reactive to light.   Cardiovascular:      Rate and Rhythm: Normal rate.      Heart sounds: Normal heart sounds. No murmur heard.  Pulmonary:      Effort: No respiratory distress.      Breath sounds: Normal breath sounds. No stridor. No wheezing, rhonchi or rales.   Chest:      Chest wall: No tenderness.   Abdominal:      General: Abdomen is flat. There is no distension.      Palpations: Abdomen is soft.   Musculoskeletal:         General: Normal range of motion.      Cervical back: Normal range of motion. Muscular tenderness present.   Lymphadenopathy:      Cervical: Cervical adenopathy present.   Skin:     General: Skin is warm and dry.      Capillary Refill: Capillary refill takes less than 2 seconds.   Neurological:      General: No focal deficit present.      Mental Status: He is alert and oriented to person, place, and time. Mental status is at baseline.   Psychiatric:         Mood and Affect: Mood normal.         Behavior: Behavior normal.         Thought Content: Thought content normal.         Judgment: Judgment normal.       Assessment/Plan:   Assessment      AVS handout given and reviewed with patient. Pt educated on red flags and when to seek treatment back in ER or UC.     1. Suspected COVID-19 virus infection  - POCT SARS-COV Antigen MORIAH  (Symptomatic only)    2. Flu-like symptoms  - POCT Influenza A/B

## 2022-12-14 NOTE — LETTER
December 14, 2022    To Whom It May Concern:         This is confirmation that Hakan Murrieta attended his scheduled appointment with MARNI Underwood on 12/14/22.  Please excuse his absence starting 12/12/2022. He may return to work on 12/17/2022 or sooner if better.        If you have any questions please do not hesitate to call me at the phone number listed below.    Sincerely,          LUIS ANTONIO Underwood.  [phone]

## 2022-12-14 NOTE — PROGRESS NOTES
Subjective:     Hakan Murrieta is a 47 y.o. male who presents for Shortness of Breath (Pt went to ER last night and was told there was nothing wrong. Chest x ray and blood came back fine), Chest Pain, Pharyngitis (Started this morning, and also lightheadedness ), and Nausea (X 2 days ago)      HPI  Pt presents for evaluation of a new problem ***    ROS    PMH:   Past Medical History:   Diagnosis Date    Apnea, sleep     Asthma     Back pain     Back pain     Chest tightness     Chickenpox     Cough     Daytime sleepiness     Depression 9/13/2021    Diarrhea     Dizziness     Frequent headaches     CAITLYN (generalized anxiety disorder) 9/13/2021    GERD (gastroesophageal reflux disease)     [place] measles     Hearing difficulty     Heartburn     Hyperlipidemia     Insomnia     Migraine     Morning headache     Mumps     Painful joint     Palpitations     Ringing in ears     Shortness of breath     Snoring     Sore throat, chronic     Tonsillitis     Weakness      ALLERGIES: No Known Allergies  SURGHX:   Past Surgical History:   Procedure Laterality Date    CHOLECYSTECTOMY      INGUINAL HERNIA REPAIR CHILD Right     TONSILLECTOMY AND ADENOIDECTOMY      VASECTOMY       SOCHX:   Social History     Socioeconomic History    Marital status:    Tobacco Use    Smoking status: Former     Packs/day: 0.25     Years: 10.00     Pack years: 2.50     Types: Cigars, Cigarettes    Smokeless tobacco: Current     Types: Chew     Last attempt to quit: 1/1/2016   Vaping Use    Vaping Use: Never used   Substance and Sexual Activity    Alcohol use: Yes     Alcohol/week: 1.8 oz     Types: 3 Cans of beer per week     Comment: weekly    Drug use: No    Sexual activity: Yes     Partners: Female     Birth control/protection: Male Sterilization     FH:   Family History   Problem Relation Age of Onset    No Known Problems Mother     Suicide Attempts Father     Diabetes Maternal Grandmother     Dementia Maternal Grandfather     Diabetes  "Paternal Grandmother     Rheumatologic Disease Paternal Grandmother     Heart Attack Paternal Grandfather          Objective:   BP (!) 132/90   Pulse 78   Temp 36.2 °C (97.2 °F) (Temporal)   Resp 18   Ht 1.778 m (5' 10\")   Wt (!) 125 kg (276 lb 9.6 oz)   SpO2 98%   BMI 39.69 kg/m²     Physical Exam    Assessment/Plan:   Assessment      AVS handout given and reviewed with patient. Pt educated on red flags and when to seek treatment back in ER or UC.     1. Suspected COVID-19 virus infection  - POCT SARS-COV Antigen MORIAH (Symptomatic only)    2. Flu-like symptoms  - POCT Influenza A/B    "

## 2022-12-15 LAB
FLUAV RNA SPEC QL NAA+PROBE: NEGATIVE
FLUBV RNA SPEC QL NAA+PROBE: NEGATIVE
SARS-COV-2 RNA RESP QL NAA+PROBE: NOTDETECTED
SPECIMEN SOURCE: NORMAL

## 2023-05-24 ENCOUNTER — OFFICE VISIT (OUTPATIENT)
Dept: MEDICAL GROUP | Facility: CLINIC | Age: 48
End: 2023-05-24
Payer: COMMERCIAL

## 2023-05-24 VITALS
WEIGHT: 276.2 LBS | SYSTOLIC BLOOD PRESSURE: 138 MMHG | RESPIRATION RATE: 16 BRPM | BODY MASS INDEX: 39.54 KG/M2 | TEMPERATURE: 97.7 F | HEART RATE: 75 BPM | HEIGHT: 70 IN | DIASTOLIC BLOOD PRESSURE: 96 MMHG | OXYGEN SATURATION: 98 %

## 2023-05-24 DIAGNOSIS — Z12.11 COLON CANCER SCREENING: ICD-10-CM

## 2023-05-24 DIAGNOSIS — R73.03 PREDIABETES: ICD-10-CM

## 2023-05-24 DIAGNOSIS — G47.33 OBSTRUCTIVE SLEEP APNEA SYNDROME: ICD-10-CM

## 2023-05-24 DIAGNOSIS — R07.89 OTHER CHEST PAIN: ICD-10-CM

## 2023-05-24 DIAGNOSIS — K21.9 GASTROESOPHAGEAL REFLUX DISEASE WITHOUT ESOPHAGITIS: ICD-10-CM

## 2023-05-24 DIAGNOSIS — R10.13 EPIGASTRIC PAIN: ICD-10-CM

## 2023-05-24 DIAGNOSIS — E78.1 HYPERTRIGLYCERIDEMIA: ICD-10-CM

## 2023-05-24 DIAGNOSIS — R74.8 ELEVATED LIVER ENZYMES: ICD-10-CM

## 2023-05-24 DIAGNOSIS — I49.8 SINUS ARRHYTHMIA: ICD-10-CM

## 2023-05-24 DIAGNOSIS — I51.7 LEFT VENTRICULAR HYPERTROPHY: ICD-10-CM

## 2023-05-24 PROCEDURE — 3075F SYST BP GE 130 - 139MM HG: CPT | Performed by: PHYSICIAN ASSISTANT

## 2023-05-24 PROCEDURE — 93000 ELECTROCARDIOGRAM COMPLETE: CPT | Mod: 59 | Performed by: PHYSICIAN ASSISTANT

## 2023-05-24 PROCEDURE — 99214 OFFICE O/P EST MOD 30 MIN: CPT | Performed by: PHYSICIAN ASSISTANT

## 2023-05-24 PROCEDURE — 3080F DIAST BP >= 90 MM HG: CPT | Performed by: PHYSICIAN ASSISTANT

## 2023-05-24 RX ORDER — OMEPRAZOLE 40 MG/1
40 CAPSULE, DELAYED RELEASE ORAL DAILY
Qty: 90 CAPSULE | Refills: 0 | Status: SHIPPED | OUTPATIENT
Start: 2023-05-24 | End: 2023-06-14

## 2023-05-24 ASSESSMENT — PATIENT HEALTH QUESTIONNAIRE - PHQ9: CLINICAL INTERPRETATION OF PHQ2 SCORE: 0

## 2023-05-24 ASSESSMENT — FIBROSIS 4 INDEX: FIB4 SCORE: 0.8

## 2023-05-24 NOTE — LETTER
May 24, 2023       Patient: Hakan Murrieta   YOB: 1975   Date of Visit: 5/24/2023         To Whom It May Concern:    Patient was seen in office today.    If you have any questions or concerns, please don't hesitate to call [phone]          Sincerely,          Cari Carl P.A.-C.  Electronically Signed

## 2023-05-24 NOTE — PROGRESS NOTES
cc:  chest pain    Subjective:     Hakan Murrieta is a 47 y.o. male presenting for chest pain      Patient presents to the office for chest pain.  Patient was diagnosed with covid 2021 in September.  Patient states that he has been having problems since.  He sates that he has been having anxiety attacks.  He has been having chest pain and dizziness.  He has been having chest wall pain.  He has been coughing also but the cough was before covid.   He was seen by pulmonary medicine and was told he was ok.     Patient states that he has been having acid reflux when he sleeps.  It has been going on for 3 months and is usually worse with alcohol.       Patient is due for routine lab work.  He does have prediabetes, hypertriglyceridemia, elevated liver function test.  He is also due for colon cancer screening.    Patient indicates that he does see pulmonary medicine and has been diagnosed with obstructive sleep apnea.    As of note, patient also indicates that he has paresthesia type symptoms in his hands and will wake up with symptoms.  He is concerned about carpal tunnel syndrome.    Review of systems:  See above.   Denies any symptoms unless previously indicated.        Current Outpatient Medications:     omeprazole (PRILOSEC) 40 MG delayed-release capsule, Take 1 Capsule by mouth every day., Disp: 90 Capsule, Rfl: 0    methylPREDNISolone (MEDROL DOSEPAK) 4 MG Tablet Therapy Pack, Follow schedule on package instructions. (Patient not taking: Reported on 5/24/2023), Disp: 21 Tablet, Rfl: 0    albuterol (PROVENTIL) 2.5mg/3ml Nebu Soln solution for nebulization, Take 3 mL by nebulization every four hours as needed for Shortness of Breath. (Patient not taking: Reported on 11/11/2022), Disp: 30 Each, Rfl: 1    Allergies, past medical history, past surgical history, family history, social history reviewed and updated    Objective:     Vitals: BP (!) 138/96 (BP Location: Left arm, Patient Position: Sitting, BP Cuff Size: Large  "adult)   Pulse 75   Temp 36.5 °C (97.7 °F) (Temporal)   Resp 16   Ht 1.778 m (5' 10\")   Wt (!) 125 kg (276 lb 3.2 oz)   SpO2 98%   BMI 39.63 kg/m²   General: Alert, pleasant, NAD  EYES:   PERRL, EOMI, no icterus or pallor.  Conjunctivae and lids normal.   HENT:  Normocephalic.  External ears normal. .  Neck supple.     Respiratory: Normal respiratory effort.    Abdomen: obese  Skin: Warm, dry, no rashes.  Musculoskeletal: Gait is normal.  Moves all extremities well.    Extremities: normal range of motion all extremities.   Neurological: No tremors, sensation grossly intact, CN2-12 intact.  Psych:  Affect/mood is normal, judgement is good, memory is intact, grooming is appropriate.  EKG: Similar to reading from the 12-.  Left ventricular hypertrophy with sinus arrhythmia.    Assessment/Plan:     Hakan was seen today for establish care and chest pain.    Diagnoses and all orders for this visit:    Other chest pain  -     EKG - Clinic Performed  -     H. PYLORI BREATH TEST  -     AMYLASE; Future  -     LIPASE; Future  Left ventricular hypertrophy  -     REFERRAL TO CARDIOLOGY  -     EC-ECHOCARDIOGRAM COMPLETE W/O CONT; Future  -     Treadmill Stress  -     TSH WITH REFLEX TO FT4; Future  Sinus arrhythmia  -     REFERRAL TO CARDIOLOGY  -     EC-ECHOCARDIOGRAM COMPLETE W/O CONT; Future  -     Treadmill Stress  -     TSH WITH REFLEX TO FT4; Future    EKG does show sinus arrhythmia with mild left ventricular hypertrophy.  We will obtain an echo as well as a stress test as he has been having chest pain.  We will also obtain an H. pylori breath test amylase and lipase.  Plan is to follow-up in 2 to 4 weeks with test results, sooner if needed.    Prediabetes  -     Comp Metabolic Panel; Future  -     HEMOGLOBIN A1C; Future  Hypertriglyceridemia  -     Lipid Profile; Future  Elevated liver enzymes  -     CBC WITH DIFFERENTIAL; Future    Lab work has been ordered to evaluate further.  Follow-up in 2 to 4 weeks " with test results.    Epigastric pain  -     H. PYLORI BREATH TEST  -     AMYLASE; Future  -     LIPASE; Future  Gastroesophageal reflux disease without esophagitis  -     H. PYLORI BREATH TEST  -     AMYLASE; Future  -     LIPASE; Future  -     omeprazole (PRILOSEC) 40 MG delayed-release capsule; Take 1 Capsule by mouth every day.    Possible reason for the chest pain.  Will obtain H. pylori, amylase and lipase and try patient on omeprazole.  If symptoms improve, may be an indicator for acid reflux or GERD causing chest pain.    Obstructive sleep apnea syndrome    Follow-up with pulmonary medicine as needed.    Colon cancer screening  -     COLOGUARD (FIT DNA)    Cologuard ordered.    We did not have an opportunity due to discussed the paresthesia in detail.  However if this is carpal tunnel, he will need to attempt wrist splints for 6 weeks.  Therefore he will begin this process and we will follow-up further at next visit.        Return in about 2 weeks (around 6/7/2023), or if symptoms worsen or fail to improve, for 2-4 weeks sooner if needed..    Please note that this dictation was created using voice recognition software. I have made every reasonable attempt to correct obvious errors, but expect that there are errors of grammar and possible content that I did not discover before finalizing note.

## 2023-05-27 ENCOUNTER — HOSPITAL ENCOUNTER (OUTPATIENT)
Dept: LAB | Facility: MEDICAL CENTER | Age: 48
End: 2023-05-27
Attending: PHYSICIAN ASSISTANT
Payer: COMMERCIAL

## 2023-05-27 DIAGNOSIS — K21.9 GASTROESOPHAGEAL REFLUX DISEASE WITHOUT ESOPHAGITIS: ICD-10-CM

## 2023-05-27 DIAGNOSIS — R74.8 ELEVATED LIVER ENZYMES: ICD-10-CM

## 2023-05-27 DIAGNOSIS — R07.89 OTHER CHEST PAIN: ICD-10-CM

## 2023-05-27 DIAGNOSIS — I49.8 SINUS ARRHYTHMIA: ICD-10-CM

## 2023-05-27 DIAGNOSIS — E78.1 HYPERTRIGLYCERIDEMIA: ICD-10-CM

## 2023-05-27 DIAGNOSIS — R10.13 EPIGASTRIC PAIN: ICD-10-CM

## 2023-05-27 DIAGNOSIS — I51.7 LEFT VENTRICULAR HYPERTROPHY: ICD-10-CM

## 2023-05-27 DIAGNOSIS — R73.03 PREDIABETES: ICD-10-CM

## 2023-05-27 LAB
ALBUMIN SERPL BCP-MCNC: 4.1 G/DL (ref 3.2–4.9)
ALBUMIN/GLOB SERPL: 1.5 G/DL
ALP SERPL-CCNC: 71 U/L (ref 30–99)
ALT SERPL-CCNC: 30 U/L (ref 2–50)
AMYLASE SERPL-CCNC: 49 U/L (ref 20–103)
ANION GAP SERPL CALC-SCNC: 9 MMOL/L (ref 7–16)
AST SERPL-CCNC: 21 U/L (ref 12–45)
BASOPHILS # BLD AUTO: 1.4 % (ref 0–1.8)
BASOPHILS # BLD: 0.13 K/UL (ref 0–0.12)
BILIRUB SERPL-MCNC: 0.7 MG/DL (ref 0.1–1.5)
BUN SERPL-MCNC: 16 MG/DL (ref 8–22)
CALCIUM ALBUM COR SERPL-MCNC: 9.1 MG/DL (ref 8.5–10.5)
CALCIUM SERPL-MCNC: 9.2 MG/DL (ref 8.5–10.5)
CHLORIDE SERPL-SCNC: 109 MMOL/L (ref 96–112)
CHOLEST SERPL-MCNC: 130 MG/DL (ref 100–199)
CO2 SERPL-SCNC: 25 MMOL/L (ref 20–33)
CREAT SERPL-MCNC: 0.83 MG/DL (ref 0.5–1.4)
EOSINOPHIL # BLD AUTO: 0.62 K/UL (ref 0–0.51)
EOSINOPHIL NFR BLD: 6.6 % (ref 0–6.9)
ERYTHROCYTE [DISTWIDTH] IN BLOOD BY AUTOMATED COUNT: 44.3 FL (ref 35.9–50)
EST. AVERAGE GLUCOSE BLD GHB EST-MCNC: 123 MG/DL
FASTING STATUS PATIENT QL REPORTED: NORMAL
GFR SERPLBLD CREATININE-BSD FMLA CKD-EPI: 109 ML/MIN/1.73 M 2
GLOBULIN SER CALC-MCNC: 2.7 G/DL (ref 1.9–3.5)
GLUCOSE SERPL-MCNC: 113 MG/DL (ref 65–99)
HBA1C MFR BLD: 5.9 % (ref 4–5.6)
HCT VFR BLD AUTO: 48.6 % (ref 42–52)
HDLC SERPL-MCNC: 36 MG/DL
HGB BLD-MCNC: 15.8 G/DL (ref 14–18)
IMM GRANULOCYTES # BLD AUTO: 0.04 K/UL (ref 0–0.11)
IMM GRANULOCYTES NFR BLD AUTO: 0.4 % (ref 0–0.9)
LDLC SERPL CALC-MCNC: 57 MG/DL
LIPASE SERPL-CCNC: 39 U/L (ref 11–82)
LYMPHOCYTES # BLD AUTO: 2.48 K/UL (ref 1–4.8)
LYMPHOCYTES NFR BLD: 26.4 % (ref 22–41)
MCH RBC QN AUTO: 29 PG (ref 27–33)
MCHC RBC AUTO-ENTMCNC: 32.5 G/DL (ref 32.3–36.5)
MCV RBC AUTO: 89.3 FL (ref 81.4–97.8)
MONOCYTES # BLD AUTO: 0.87 K/UL (ref 0–0.85)
MONOCYTES NFR BLD AUTO: 9.2 % (ref 0–13.4)
NEUTROPHILS # BLD AUTO: 5.27 K/UL (ref 1.82–7.42)
NEUTROPHILS NFR BLD: 56 % (ref 44–72)
NRBC # BLD AUTO: 0 K/UL
NRBC BLD-RTO: 0 /100 WBC (ref 0–0.2)
PLATELET # BLD AUTO: 292 K/UL (ref 164–446)
PMV BLD AUTO: 9.6 FL (ref 9–12.9)
POTASSIUM SERPL-SCNC: 4.4 MMOL/L (ref 3.6–5.5)
PROT SERPL-MCNC: 6.8 G/DL (ref 6–8.2)
RBC # BLD AUTO: 5.44 M/UL (ref 4.7–6.1)
SODIUM SERPL-SCNC: 143 MMOL/L (ref 135–145)
TRIGL SERPL-MCNC: 183 MG/DL (ref 0–149)
TSH SERPL DL<=0.005 MIU/L-ACNC: 1.64 UIU/ML (ref 0.38–5.33)
WBC # BLD AUTO: 9.4 K/UL (ref 4.8–10.8)

## 2023-05-27 PROCEDURE — 80061 LIPID PANEL: CPT

## 2023-05-27 PROCEDURE — 36415 COLL VENOUS BLD VENIPUNCTURE: CPT

## 2023-05-27 PROCEDURE — 83013 H PYLORI (C-13) BREATH: CPT

## 2023-05-27 PROCEDURE — 83690 ASSAY OF LIPASE: CPT

## 2023-05-27 PROCEDURE — 83036 HEMOGLOBIN GLYCOSYLATED A1C: CPT

## 2023-05-27 PROCEDURE — 85025 COMPLETE CBC W/AUTO DIFF WBC: CPT

## 2023-05-27 PROCEDURE — 80053 COMPREHEN METABOLIC PANEL: CPT

## 2023-05-27 PROCEDURE — 84443 ASSAY THYROID STIM HORMONE: CPT

## 2023-05-27 PROCEDURE — 82150 ASSAY OF AMYLASE: CPT

## 2023-05-30 LAB — UREA BREATH TEST QL: NEGATIVE

## 2023-06-14 ENCOUNTER — OFFICE VISIT (OUTPATIENT)
Dept: MEDICAL GROUP | Facility: CLINIC | Age: 48
End: 2023-06-14
Payer: COMMERCIAL

## 2023-06-14 VITALS
HEIGHT: 70 IN | RESPIRATION RATE: 16 BRPM | SYSTOLIC BLOOD PRESSURE: 128 MMHG | OXYGEN SATURATION: 96 % | DIASTOLIC BLOOD PRESSURE: 82 MMHG | TEMPERATURE: 98.2 F | HEART RATE: 83 BPM | BODY MASS INDEX: 40.69 KG/M2 | WEIGHT: 284.2 LBS

## 2023-06-14 DIAGNOSIS — R10.13 EPIGASTRIC PAIN: ICD-10-CM

## 2023-06-14 DIAGNOSIS — K60.2 FISSURE, ANAL: ICD-10-CM

## 2023-06-14 DIAGNOSIS — R07.89 OTHER CHEST PAIN: ICD-10-CM

## 2023-06-14 DIAGNOSIS — R05.3 CHRONIC COUGH: ICD-10-CM

## 2023-06-14 DIAGNOSIS — K59.1 FUNCTIONAL DIARRHEA: ICD-10-CM

## 2023-06-14 DIAGNOSIS — K21.9 GASTROESOPHAGEAL REFLUX DISEASE WITHOUT ESOPHAGITIS: ICD-10-CM

## 2023-06-14 PROCEDURE — 99214 OFFICE O/P EST MOD 30 MIN: CPT | Performed by: PHYSICIAN ASSISTANT

## 2023-06-14 PROCEDURE — 3079F DIAST BP 80-89 MM HG: CPT | Performed by: PHYSICIAN ASSISTANT

## 2023-06-14 PROCEDURE — 3074F SYST BP LT 130 MM HG: CPT | Performed by: PHYSICIAN ASSISTANT

## 2023-06-14 RX ORDER — PANTOPRAZOLE SODIUM 40 MG/1
40 TABLET, DELAYED RELEASE ORAL DAILY
Qty: 90 TABLET | Refills: 1 | Status: SHIPPED | OUTPATIENT
Start: 2023-06-14 | End: 2024-01-23 | Stop reason: SDUPTHER

## 2023-06-14 ASSESSMENT — FIBROSIS 4 INDEX: FIB4 SCORE: 0.6

## 2023-06-14 NOTE — PROGRESS NOTES
"cc:  hyperlipidemia    Subjective:     Hakan Murrieta is a 47 y.o. male presenting for hyperlipidemia      Patient presents to the office for hyperlipidemia.  Patient states that he had some chest pain while at work that radiated up into his neck.  He states that he is not sure if he twisted wrong or if something else caused the pain.  He states that the omeprazole has not helped.  He is still having abdominal pain and states that the fatigue is worse.  He states that he is not having any breathing problems but states that he was short of breath with hiking.   He states that he will sometimes use his cpap machine.  Sometimes he will fall asleep before he puts it on.   Patient states that the cough is chronic.  He has been on acid reflux medication and inhalers as well as allergy medication and nothing has helped.    Review of systems:  See above.   Denies any symptoms unless previously indicated.        Current Outpatient Medications:     pantoprazole (PROTONIX) 40 MG Tablet Delayed Response, Take 1 Tablet by mouth every day., Disp: 90 Tablet, Rfl: 1    Allergies, past medical history, past surgical history, family history, social history reviewed and updated    Objective:     Vitals: /82 (BP Location: Left arm, Patient Position: Sitting, BP Cuff Size: Adult long)   Pulse 83   Temp 36.8 °C (98.2 °F) (Temporal)   Resp 16   Ht 1.778 m (5' 10\")   Wt (!) 129 kg (284 lb 3.2 oz) Comment: with steel toes on  SpO2 96%   BMI 40.78 kg/m²   General: Alert, pleasant, NAD  EYES:   PERRL, EOMI, no icterus or pallor.  Conjunctivae and lids normal.   HENT:  Normocephalic.  External ears normal.  Neck supple.     Respiratory: Normal respiratory effort.    Abdomen: obese  Skin: Warm, dry, no rashes.  Musculoskeletal: Gait is normal.  Moves all extremities well.    Extremities: normal range of motion all extremities.   Neurological: No tremors, sensation grossly intact, CN2-12 intact.  Psych:  Affect/mood is normal, " judgement is good, memory is intact, grooming is appropriate.     Latest Reference Range & Units 05/27/23 08:39   WBC 4.8 - 10.8 K/uL 9.4   RBC 4.70 - 6.10 M/uL 5.44   Hemoglobin 14.0 - 18.0 g/dL 15.8   Hematocrit 42.0 - 52.0 % 48.6   MCV 81.4 - 97.8 fL 89.3   MCH 27.0 - 33.0 pg 29.0   MCHC 32.3 - 36.5 g/dL 32.5   RDW 35.9 - 50.0 fL 44.3   Platelet Count 164 - 446 K/uL 292   MPV 9.0 - 12.9 fL 9.6   Neutrophils-Polys 44.00 - 72.00 % 56.00   Neutrophils (Absolute) 1.82 - 7.42 K/uL 5.27   Lymphocytes 22.00 - 41.00 % 26.40   Lymphs (Absolute) 1.00 - 4.80 K/uL 2.48   Monocytes 0.00 - 13.40 % 9.20   Monos (Absolute) 0.00 - 0.85 K/uL 0.87 (H)   Eosinophils 0.00 - 6.90 % 6.60   Eos (Absolute) 0.00 - 0.51 K/uL 0.62 (H)   Basophils 0.00 - 1.80 % 1.40   Baso (Absolute) 0.00 - 0.12 K/uL 0.13 (H)   Immature Granulocytes 0.00 - 0.90 % 0.40   Immature Granulocytes (abs) 0.00 - 0.11 K/uL 0.04   Nucleated RBC 0.00 - 0.20 /100 WBC 0.00   NRBC (Absolute) K/uL 0.00   Sodium 135 - 145 mmol/L 143   Potassium 3.6 - 5.5 mmol/L 4.4   Chloride 96 - 112 mmol/L 109   Co2 20 - 33 mmol/L 25   Anion Gap 7.0 - 16.0  9.0   Glucose 65 - 99 mg/dL 113 (H)   Bun 8 - 22 mg/dL 16   Creatinine 0.50 - 1.40 mg/dL 0.83   GFR (CKD-EPI) >60 mL/min/1.73 m 2 109   Calcium 8.5 - 10.5 mg/dL 9.2   Correct Calcium 8.5 - 10.5 mg/dL 9.1   AST(SGOT) 12 - 45 U/L 21   ALT(SGPT) 2 - 50 U/L 30   Alkaline Phosphatase 30 - 99 U/L 71   Total Bilirubin 0.1 - 1.5 mg/dL 0.7   Albumin 3.2 - 4.9 g/dL 4.1   Total Protein 6.0 - 8.2 g/dL 6.8   Globulin 1.9 - 3.5 g/dL 2.7   A-G Ratio g/dL 1.5   Lipase 11 - 82 U/L 39   Amylase 20 - 103 U/L 49   Glycohemoglobin 4.0 - 5.6 % 5.9 (H)   Estim. Avg Glu mg/dL 123   Fasting Status  Fasting   Cholesterol,Tot 100 - 199 mg/dL 130   Triglycerides 0 - 149 mg/dL 183 (H)   HDL >=40 mg/dL 36 !   LDL <100 mg/dL 57   (H): Data is abnormally high  !: Data is abnormal    Assessment/Plan:     Hakan was seen today for results.    Diagnoses and all orders  for this visit:    Gastroesophageal reflux disease without esophagitis  -     pantoprazole (PROTONIX) 40 MG Tablet Delayed Response; Take 1 Tablet by mouth every day.  Chronic cough  -     DX-CHEST-2 VIEWS; Future  Other chest pain  Epigastric pain  Functional diarrhea  Fissure, anal    We will try switching the omeprazole to pantoprazole and see if this helps symptoms.  Patient will schedule cardiology testing.  We will plan to follow-up in 4 to 6 weeks.  He does have diarrhea type symptoms.  My concern is this could potentially be GI related.  We did discuss endoscopy.  He would like to hold off at this time.  Also discussed labs for diarrhea.  Again he would like to wait until he proceeds with cardiology testing.  We will obtain a chest x-ray to evaluate further as well.        Return in about 4 weeks (around 7/12/2023), or if symptoms worsen or fail to improve, for 4-6 weeks.    Please note that this dictation was created using voice recognition software. I have made every reasonable attempt to correct obvious errors, but expect that there are errors of grammar and possible content that I did not discover before finalizing note.

## 2023-06-16 ENCOUNTER — APPOINTMENT (OUTPATIENT)
Dept: RADIOLOGY | Facility: IMAGING CENTER | Age: 48
End: 2023-06-16
Attending: PHYSICIAN ASSISTANT
Payer: COMMERCIAL

## 2023-06-16 ENCOUNTER — NON-PROVIDER VISIT (OUTPATIENT)
Dept: URGENT CARE | Facility: CLINIC | Age: 48
End: 2023-06-16
Payer: COMMERCIAL

## 2023-06-16 DIAGNOSIS — R05.3 CHRONIC COUGH: ICD-10-CM

## 2023-06-16 PROCEDURE — 71046 X-RAY EXAM CHEST 2 VIEWS: CPT | Mod: TC | Performed by: RADIOLOGY

## 2023-07-13 ENCOUNTER — ANCILLARY PROCEDURE (OUTPATIENT)
Dept: CARDIOLOGY | Facility: MEDICAL CENTER | Age: 48
End: 2023-07-13
Attending: PHYSICIAN ASSISTANT
Payer: COMMERCIAL

## 2023-07-13 DIAGNOSIS — I51.7 LEFT VENTRICULAR HYPERTROPHY: ICD-10-CM

## 2023-07-13 DIAGNOSIS — I49.8 SINUS ARRHYTHMIA: ICD-10-CM

## 2023-07-13 LAB
LV EJECT FRACT  99904: 60
LV EJECT FRACT MOD 2C 99903: 58.83
LV EJECT FRACT MOD 4C 99902: 58.28
LV EJECT FRACT MOD BP 99901: 58.92

## 2023-07-13 PROCEDURE — 93306 TTE W/DOPPLER COMPLETE: CPT | Mod: 26 | Performed by: INTERNAL MEDICINE

## 2023-07-13 PROCEDURE — 93306 TTE W/DOPPLER COMPLETE: CPT

## 2023-07-20 ENCOUNTER — OFFICE VISIT (OUTPATIENT)
Dept: MEDICAL GROUP | Facility: CLINIC | Age: 48
End: 2023-07-20
Payer: COMMERCIAL

## 2023-07-20 VITALS
WEIGHT: 282.8 LBS | OXYGEN SATURATION: 95 % | HEART RATE: 86 BPM | BODY MASS INDEX: 40.49 KG/M2 | HEIGHT: 70 IN | TEMPERATURE: 98 F | SYSTOLIC BLOOD PRESSURE: 118 MMHG | DIASTOLIC BLOOD PRESSURE: 72 MMHG | RESPIRATION RATE: 20 BRPM

## 2023-07-20 DIAGNOSIS — G47.33 OBSTRUCTIVE SLEEP APNEA SYNDROME: ICD-10-CM

## 2023-07-20 DIAGNOSIS — K21.9 GASTROESOPHAGEAL REFLUX DISEASE WITHOUT ESOPHAGITIS: ICD-10-CM

## 2023-07-20 DIAGNOSIS — R07.89 OTHER CHEST PAIN: ICD-10-CM

## 2023-07-20 PROCEDURE — 3074F SYST BP LT 130 MM HG: CPT | Performed by: PHYSICIAN ASSISTANT

## 2023-07-20 PROCEDURE — 99213 OFFICE O/P EST LOW 20 MIN: CPT | Performed by: PHYSICIAN ASSISTANT

## 2023-07-20 PROCEDURE — 3078F DIAST BP <80 MM HG: CPT | Performed by: PHYSICIAN ASSISTANT

## 2023-07-20 RX ORDER — CELECOXIB 200 MG/1
200 CAPSULE ORAL DAILY
Qty: 90 CAPSULE | Refills: 0 | Status: SHIPPED | OUTPATIENT
Start: 2023-07-20 | End: 2023-09-07 | Stop reason: SDUPTHER

## 2023-07-20 ASSESSMENT — FIBROSIS 4 INDEX: FIB4 SCORE: 0.6

## 2023-07-20 NOTE — PROGRESS NOTES
"cc:  chest pain    Subjective:     Hakan Murrieta is a 47 y.o. male presenting for chest pain      Patient presents to the office for chest pain.  Patient has seen cardiology.  He has been referred to pulmonary medicine to possibly repeat the sleep study.   He does feels that the protonix has helped some.  He would have pain every morning when he wakes up.  Not it is not every morning.  He states the fatigue is worse. He feels that this could happen even while driving.  He has been on inhalers in the past which have helped.     Review of systems:  See above.   Denies any symptoms unless previously indicated.        Current Outpatient Medications:     celecoxib (CELEBREX) 200 MG Cap, Take 1 Capsule by mouth every day., Disp: 90 Capsule, Rfl: 0    pantoprazole (PROTONIX) 40 MG Tablet Delayed Response, Take 1 Tablet by mouth every day., Disp: 90 Tablet, Rfl: 1    Allergies, past medical history, past surgical history, family history, social history reviewed and updated    Objective:     Vitals: /72 (BP Location: Left arm, Patient Position: Sitting, BP Cuff Size: Adult)   Pulse 86   Temp 36.7 °C (98 °F) (Temporal)   Resp 20   Ht 1.778 m (5' 10\")   Wt (!) 128 kg (282 lb 12.8 oz)   SpO2 95%   BMI 40.58 kg/m²   General: Alert, pleasant, NAD  EYES:   PERRL, EOMI, no icterus or pallor.  Conjunctivae and lids normal.   HENT:  Normocephalic.  External ears normal.   Neck supple.    Respiratory: Normal respiratory effort.    Abdomen: obesity.  Skin: Warm, dry, no rashes.  Musculoskeletal: Gait is normal.  Moves all extremities well.    Extremities: normal range of motion all extremities.   Neurological: No tremors, sensation grossly intact,  CN2-12 intact.  Psych:  Affect/mood is normal, judgement is good, memory is intact, grooming is appropriate.    Transthoracic  Echo Report        Echocardiography Laboratory     CONCLUSIONS  Normal left ventricular systolic function.  Mild aortic insufficiency.     TD, " TIA  Exam Date:         2023                      07:51  Exam Location:     Out Patient  Priority:          Routine     Ordering Physician:        RADHA MAURICIO  Referring Physician:  Sonographer:               Cruz HAMPTON     Age:    47     Gender:    M  MRN:    [id number]  :    1975  BSA:    2.42   Ht (in):    70     Wt (lb):    284  Exam Type:     Complete     Indications:     Other hypertrophic cardiomyopathy  ICD Codes:       I42.2     CPT Codes:       89217     BP:   128    /   82     HR:   70  Technical Quality:       Fair     MEASUREMENTS  (Male / Female) Normal Values  2D ECHO  LV Diastolic Diameter PLAX        5.9 cm                4.2 - 5.9 / 3.9 - 5.3   cm  LV Systolic Diameter PLAX         4 cm                  2.1 - 4.0 cm  IVS Diastolic Thickness           0.9 cm                  LVPW Diastolic Thickness          0.9 cm                  LVOT Diameter                     2.3 cm                  Estimated LV Ejection Fraction    60 %                    LV Ejection Fraction MOD BP       58.9 %                >= 55  %  LV Ejection Fraction MOD 4C       58.3 %                  LV Ejection Fraction MOD 2C       58.8 %                  LV Ejection Fraction 4C AL        59.7 %                  LV Ejection Fraction 2C AL        61.1 %                  LA Volume Index                   30.9 cm3/m2           16 - 28 cm3/m2     DOPPLER  AV Peak Velocity                  1.3 m/s                 AV Peak Gradient                  7 mmHg                  AV Mean Gradient                  4 mmHg                  LVOT Peak Velocity                0.71 m/s                AV Area Cont Eq vti               2.4 cm2                 Mitral E Point Velocity           0.73 m/s                Mitral E to A Ratio               1.1                     MV Pressure Half Time             64 ms                   MV Area PHT                       3.4 cm2                 MV  Deceleration Time              219 ms                  TR Peak Velocity                  260 cm/s                PV Peak Velocity                  1.2 m/s                 PV Peak Gradient                  6 mmHg                  RVOT Peak Velocity                0.64 m/s                LV E' Lateral Velocity            12.2 cm/s               Mitral E to LV E' Lateral Ratio   6                       LV E' Septal Velocity             10.7 cm/s               Mitral E to LV E' Septal Ratio    6.8                        * Indicates values subject to auto-interpretation  LV EF:  60    %     FINDINGS  Left Ventricle  Normal left ventricular chamber size. Normal left ventricular wall   thickness. Normal left ventricular systolic function. The left   ventricular ejection fraction is visually estimated to be 60%. Normal   regional wall motion. Normal diastolic function.     Right Ventricle  The right ventricle is normal in size and systolic function.     Right Atrium  The right atrium is normal in size. Normal inferior vena cava size and   inspiratory collapse.     Left Atrium  The left atrium is normal in size. Left atrial volume index is 29 mL/sq   m.     Mitral Valve  Structurally normal mitral valve without significant stenosis. Trace   mitral regurgitation.     Aortic Valve  Tricuspid aortic valve. No aortic valve stenosis. Mild aortic   insufficiency. Vena contracta is 0.2 cm.     Tricuspid Valve  Structurally normal tricuspid valve without significant stenosis. Mild   tricuspid regurgitation. Estimated right ventricular systolic pressure   is 30 mmHg. Right atrial pressure is estimated to be 3 mmHg.     Pulmonic Valve  The pulmonic valve is not well visualized. No pulmonic stenosis. Trace   pulmonic insufficiency.     Pericardium  No pericardial effusion.     Aorta  Normal aortic root for body surface area. The ascending aorta diameter   is 3.1 cm.       Assessment/Plan:     Hakan was seen today for follow-up and lab  results.    Diagnoses and all orders for this visit:    Other chest pain  -     celecoxib (CELEBREX) 200 MG Cap; Take 1 Capsule by mouth every day.    Gastroesophageal reflux disease without esophagitis    Obstructive sleep apnea syndrome        Patient is seeing cardiology.  Echo is essentially negative except for some mild aortic regurgitation.  He does have Holter monitor on at this time.  He has been referred to pulmonary medicine.  Protonix did help some.  Possibly costochondritis?  We can try Celebrex.  Follow-up in approximately 6 to 8 weeks.    No follow-ups on file.    Please note that this dictation was created using voice recognition software. I have made every reasonable attempt to correct obvious errors, but expect that there are errors of grammar and possible content that I did not discover before finalizing note.

## 2023-09-07 ENCOUNTER — OFFICE VISIT (OUTPATIENT)
Dept: MEDICAL GROUP | Facility: CLINIC | Age: 48
End: 2023-09-07
Payer: COMMERCIAL

## 2023-09-07 VITALS
HEIGHT: 70 IN | OXYGEN SATURATION: 97 % | BODY MASS INDEX: 40.18 KG/M2 | RESPIRATION RATE: 16 BRPM | HEART RATE: 72 BPM | DIASTOLIC BLOOD PRESSURE: 82 MMHG | TEMPERATURE: 97.6 F | SYSTOLIC BLOOD PRESSURE: 108 MMHG | WEIGHT: 280.65 LBS

## 2023-09-07 DIAGNOSIS — Z12.11 COLON CANCER SCREENING: ICD-10-CM

## 2023-09-07 DIAGNOSIS — G47.33 OBSTRUCTIVE SLEEP APNEA SYNDROME: ICD-10-CM

## 2023-09-07 DIAGNOSIS — R07.89 OTHER CHEST PAIN: ICD-10-CM

## 2023-09-07 DIAGNOSIS — Z11.59 NEED FOR HEPATITIS C SCREENING TEST: ICD-10-CM

## 2023-09-07 PROCEDURE — 99214 OFFICE O/P EST MOD 30 MIN: CPT | Performed by: PHYSICIAN ASSISTANT

## 2023-09-07 PROCEDURE — 3079F DIAST BP 80-89 MM HG: CPT | Performed by: PHYSICIAN ASSISTANT

## 2023-09-07 PROCEDURE — 3074F SYST BP LT 130 MM HG: CPT | Performed by: PHYSICIAN ASSISTANT

## 2023-09-07 RX ORDER — CELECOXIB 200 MG/1
200 CAPSULE ORAL DAILY
Qty: 90 CAPSULE | Refills: 1 | Status: SHIPPED | OUTPATIENT
Start: 2023-09-07

## 2023-09-07 ASSESSMENT — FIBROSIS 4 INDEX: FIB4 SCORE: 0.6

## 2023-09-07 NOTE — PROGRESS NOTES
"cc:  chest pain    Subjective:     Hakan Murrieta is a 47 y.o. male presenting for chest pain      Patient presents to the office for chest pain.  Patient states that the chest pain is better.  However, he does not have flair ups which is new for him.  He states that he will also have a dull chest pain when he sleeps on his stomach.  The flair is mostly when he wakes up.  He does have a history of cholecystectomy.         Patient indicates that he has been waiting for his cardiologist to enter a pulmonary medicine referral so he can discuss his sleep apnea.  He did try to schedule an appointment with [facility] pulmonary medicine.  He states he was originally told that they did not take his insurance.  However his wife has the same insurance and they were able to take hers.  When this occurred he tried again and was told that there was no referral.  He is requesting that we send in a referral for sleep apnea.    [facility] at this time and is requesting an order.    Review of systems:  See above.   Denies any symptoms unless previously indicated.        Current Outpatient Medications:     celecoxib (CELEBREX) 200 MG Cap, Take 1 Capsule by mouth every day., Disp: 90 Capsule, Rfl: 1    pantoprazole (PROTONIX) 40 MG Tablet Delayed Response, Take 1 Tablet by mouth every day., Disp: 90 Tablet, Rfl: 1    Allergies, past medical history, past surgical history, family history, social history reviewed and updated    Objective:     Vitals: /82 (BP Location: Left arm, Patient Position: Sitting, BP Cuff Size: Adult)   Pulse 72   Temp 36.4 °C (97.6 °F) (Temporal)   Resp 16   Ht 1.778 m (5' 10\")   Wt (!) 127 kg (280 lb 10.3 oz)   SpO2 97%   BMI 40.27 kg/m²   General: Alert, pleasant, NAD  EYES:   PERRL, EOMI, no icterus or pallor.  Conjunctivae and lids normal.   HENT:  Normocephalic.  External ears normal.  Neck supple.   Respiratory: Normal respiratory effort.  Clear to " auscultation bilaterally.  Abdomen: Obese  Skin: Warm, dry, no rashes.  Musculoskeletal: Gait is normal.  Moves all extremities well.    Extremities: Normal range of motion all extremities.   Neurological: No tremors, sensation grossly intact, CN2-12 intact.  Psych:  Affect/mood is normal, judgement is good, memory is intact, grooming is appropriate.    Assessment/Plan:     Hakan was seen today for chest pain.    Diagnoses and all orders for this visit:    Other chest pain  -     celecoxib (CELEBREX) 200 MG Cap; Take 1 Capsule by mouth every day.    We have seen improvement with medication.  We will continue with medication and follow-up in 8 weeks to see if flares have improved.    Obstructive sleep apnea syndrome  -     Referral to Pulmonary and Sleep Medicine    Referral submitted for sleep apnea.    Colon cancer screening  -     COLOGUARD (FIT DNA)  Need for hepatitis C screening test  -     HEP C VIRUS ANTIBODY; Future      Routine testing ordered.      Return in about 8 weeks (around 11/2/2023), or if symptoms worsen or fail to improve, for follow up chest pain.  .    Please note that this dictation was created using voice recognition software. I have made every reasonable attempt to correct obvious errors, but expect that there are errors of grammar and possible content that I did not discover before finalizing note.

## 2023-11-09 ENCOUNTER — APPOINTMENT (OUTPATIENT)
Dept: RADIOLOGY | Facility: IMAGING CENTER | Age: 48
End: 2023-11-09
Attending: PHYSICIAN ASSISTANT
Payer: COMMERCIAL

## 2023-11-09 ENCOUNTER — OFFICE VISIT (OUTPATIENT)
Dept: MEDICAL GROUP | Facility: CLINIC | Age: 48
End: 2023-11-09
Payer: COMMERCIAL

## 2023-11-09 ENCOUNTER — NON-PROVIDER VISIT (OUTPATIENT)
Dept: URGENT CARE | Facility: PHYSICIAN GROUP | Age: 48
End: 2023-11-09
Payer: COMMERCIAL

## 2023-11-09 VITALS
HEART RATE: 108 BPM | HEIGHT: 70 IN | RESPIRATION RATE: 16 BRPM | WEIGHT: 285.4 LBS | TEMPERATURE: 98.5 F | OXYGEN SATURATION: 93 % | DIASTOLIC BLOOD PRESSURE: 82 MMHG | BODY MASS INDEX: 40.86 KG/M2 | SYSTOLIC BLOOD PRESSURE: 114 MMHG

## 2023-11-09 DIAGNOSIS — M25.561 CHRONIC PAIN OF RIGHT KNEE: ICD-10-CM

## 2023-11-09 DIAGNOSIS — G89.29 CHRONIC PAIN OF RIGHT KNEE: ICD-10-CM

## 2023-11-09 DIAGNOSIS — R07.89 OTHER CHEST PAIN: ICD-10-CM

## 2023-11-09 PROCEDURE — 73562 X-RAY EXAM OF KNEE 3: CPT | Mod: TC,FY,RT | Performed by: PHYSICIAN ASSISTANT

## 2023-11-09 PROCEDURE — 3074F SYST BP LT 130 MM HG: CPT | Performed by: PHYSICIAN ASSISTANT

## 2023-11-09 PROCEDURE — 99213 OFFICE O/P EST LOW 20 MIN: CPT | Performed by: PHYSICIAN ASSISTANT

## 2023-11-09 PROCEDURE — 3079F DIAST BP 80-89 MM HG: CPT | Performed by: PHYSICIAN ASSISTANT

## 2023-11-09 RX ORDER — ASCORBIC ACID 500 MG
TABLET ORAL DAILY
COMMUNITY
End: 2023-11-09

## 2023-11-09 RX ORDER — AMPICILLIN TRIHYDRATE 250 MG
500 CAPSULE ORAL
COMMUNITY

## 2023-11-09 RX ORDER — BENZALKONIUM CHLORIDE 1.3 MG/ML
CLOTH TOPICAL
COMMUNITY
Start: 2023-11-02

## 2023-11-09 RX ORDER — ASCORBIC ACID 100 MG
TABLET,CHEWABLE ORAL
COMMUNITY

## 2023-11-09 ASSESSMENT — FIBROSIS 4 INDEX: FIB4 SCORE: 0.62

## 2023-11-09 NOTE — PROGRESS NOTES
"cc:  chest pain     Subjective:     Hakan Murrieta is a 48 y.o. male presenting for chest pain      Patient presents to the office for chest pain.  Patient states that cardiology felt that his heart is not contributing  to the chest pain.  He does feel that the celebrex is helping and would like to continue with the medication.       Patient is having right knee pain.  He will have left knee pain if he begins to limp on the right knee.  He has seen ortho in the past and xrays were normal.  MRI was ordered but he has not had this done.   He states that it is lateral and then comes around the knee cap.  He does wear a knee brace which helps.  This has been an intermittent issue for 10-15 years. He states that he felt a pop a week ago. In the past he has had PT for 20 visits.     Review of systems:  See above.   Denies any symptoms unless previously indicated.        Current Outpatient Medications:     TURMERIC PO, Take 500 mg by mouth., Disp: , Rfl:     Cinnamon 500 MG Cap, Take 500 mg by mouth., Disp: , Rfl:     Ascorbic Acid (VITAMIN C) 100 MG Chew Tab, Vitamin C, Disp: , Rfl:     celecoxib (CELEBREX) 200 MG Cap, Take 1 Capsule by mouth every day., Disp: 90 Capsule, Rfl: 1    pantoprazole (PROTONIX) 40 MG Tablet Delayed Response, Take 1 Tablet by mouth every day., Disp: 90 Tablet, Rfl: 1    Respiratory Therapy Supplies (CARETOUCH 2 CPAP HOSE ) Misc, 14 cm H20 with sleep via mask daily  HALLEY:99 for 999 days (Patient not taking: Reported on 11/9/2023), Disp: , Rfl:     Allergies, past medical history, past surgical history, family history, social history reviewed and updated    Objective:     Vitals: /82 (BP Location: Left arm, Patient Position: Sitting, BP Cuff Size: Large adult)   Pulse (!) 108   Temp 36.9 °C (98.5 °F) (Temporal)   Resp 16   Ht 1.778 m (5' 10\")   Wt (!) 129 kg (285 lb 6.4 oz)   SpO2 93%   BMI 40.95 kg/m²   General: Alert, pleasant, NAD  EYES:   PERRL, EOMI, no icterus or pallor.  " Conjunctivae and lids normal.   HENT:  Normocephalic.  External ears normal.  Neck supple.     Respiratory: Normal respiratory effort.    Abdomen: obese  Skin: Warm, dry, no rashes.  Musculoskeletal: Gait is normal.  Walks with limp.    Extremities: .  Negative anterior and posterior drawer test.  Negative Lachman sign negative Yruidia's sign.  Neurological: No tremors, sensation grossly intact,  CN2-12 intact.  Psych:  Affect/mood is normal, judgement is good, memory is intact, grooming is appropriate.    Assessment/Plan:     Hakan was seen today for chest pain and knee pain.    Diagnoses and all orders for this visit:    Chronic pain of right knee  -     DX-KNEE 3 VIEWS Atraumatic Pain/Swelling/Deformity; Future  -     MR-KNEE-W/O RIGHT; Future    Concerned about a torn ligament.  We will obtain x-rays and an MRI to evaluate further.  Popping sound was heard with me this week.  Follow-up in 4 to 6 weeks with test results, sooner if needed.    Other chest pain    Patient seen by cardiology.  Cardiac testing is negative.  Celebrex is working.  Continue with Celebrex.        Return in about 4 weeks (around 12/7/2023) for xrays and mri results.    Please note that this dictation was created using voice recognition software. I have made every reasonable attempt to correct obvious errors, but expect that there are errors of grammar and possible content that I did not discover before finalizing note.

## 2023-11-24 ENCOUNTER — OFFICE VISIT (OUTPATIENT)
Dept: URGENT CARE | Facility: PHYSICIAN GROUP | Age: 48
End: 2023-11-24
Payer: COMMERCIAL

## 2023-11-24 VITALS
HEART RATE: 87 BPM | DIASTOLIC BLOOD PRESSURE: 78 MMHG | TEMPERATURE: 97.8 F | HEIGHT: 70 IN | WEIGHT: 282 LBS | OXYGEN SATURATION: 95 % | RESPIRATION RATE: 16 BRPM | BODY MASS INDEX: 40.37 KG/M2 | SYSTOLIC BLOOD PRESSURE: 124 MMHG

## 2023-11-24 DIAGNOSIS — H66.91 ACUTE RIGHT OTITIS MEDIA: ICD-10-CM

## 2023-11-24 DIAGNOSIS — J22 ACUTE LOWER RESPIRATORY TRACT INFECTION: ICD-10-CM

## 2023-11-24 DIAGNOSIS — R06.2 WHEEZING: ICD-10-CM

## 2023-11-24 PROCEDURE — 99214 OFFICE O/P EST MOD 30 MIN: CPT | Performed by: NURSE PRACTITIONER

## 2023-11-24 PROCEDURE — 3074F SYST BP LT 130 MM HG: CPT | Performed by: NURSE PRACTITIONER

## 2023-11-24 PROCEDURE — 3078F DIAST BP <80 MM HG: CPT | Performed by: NURSE PRACTITIONER

## 2023-11-24 RX ORDER — ALBUTEROL SULFATE 90 UG/1
2 AEROSOL, METERED RESPIRATORY (INHALATION) EVERY 6 HOURS PRN
Qty: 8.5 G | Refills: 0 | Status: SHIPPED | OUTPATIENT
Start: 2023-11-24

## 2023-11-24 RX ORDER — PREDNISONE 10 MG/1
20 TABLET ORAL 2 TIMES DAILY
Qty: 20 TABLET | Refills: 0 | Status: SHIPPED | OUTPATIENT
Start: 2023-11-24 | End: 2023-11-29

## 2023-11-24 RX ORDER — AMOXICILLIN AND CLAVULANATE POTASSIUM 875; 125 MG/1; MG/1
1 TABLET, FILM COATED ORAL 2 TIMES DAILY
Qty: 14 TABLET | Refills: 0 | Status: SHIPPED | OUTPATIENT
Start: 2023-11-24 | End: 2023-12-01

## 2023-11-24 ASSESSMENT — FIBROSIS 4 INDEX: FIB4 SCORE: 0.62

## 2023-11-24 NOTE — PROGRESS NOTES
Chief Complaint   Patient presents with    Cough     X 7 days with congestion, L ear pain.       HISTORY OF PRESENT ILLNESS: Patient is a pleasant 48 y.o. male with a history of pneumonia who presents today due to symptoms which started approx one week ago.  Pt reports a cough, nasal congestion, sinus pressure, right ear pain, fatigue, malaise, and body aches. Denies chest pain, shortness of breath, or wheezing. Has tried OTC cold medications without significant relief of symptoms. No recent ABX use. No other aggravating or alleviating factors.       Patient Active Problem List    Diagnosis Date Noted    Chronic pain of right knee 11/09/2023    Functional diarrhea 06/14/2023    Fissure, anal 06/14/2023    Other chest pain 05/24/2023    Left ventricular hypertrophy 05/24/2023    Sinus arrhythmia 05/24/2023    Epigastric pain 05/24/2023    Gastroesophageal reflux disease without esophagitis 05/24/2023    Obstructive sleep apnea syndrome 05/24/2023    Encounter for completion of form with patient 02/24/2022    Physical deconditioning 01/27/2022    COVID-19 long hauler 10/11/2021    Elevated liver enzymes 10/11/2021    Allergic rhinitis due to pollen 09/13/2021    Elevated blood pressure reading without diagnosis of hypertension 09/13/2021    Hypertriglyceridemia 09/13/2021    Mild intermittent asthma 09/13/2021    Obesity 09/13/2021    Prediabetes 09/13/2021    Reactive airway disease 09/13/2021    Tobacco user 09/13/2021    Depression 09/13/2021    CAITLYN (generalized anxiety disorder) 09/13/2021    Weight gain 09/13/2021    Cough 09/13/2021    Snoring 09/13/2021    Hand numbness 09/13/2021    Migraine headache 11/05/2015       Allergies:Patient has no known allergies.    Current Outpatient Medications Ordered in Epic   Medication Sig Dispense Refill    amoxicillin-clavulanate (AUGMENTIN) 875-125 MG Tab Take 1 Tablet by mouth 2 times a day for 7 days. 14 Tablet 0    albuterol 108 (90 Base) MCG/ACT Aero Soln inhalation  aerosol Inhale 2 Puffs every 6 hours as needed for Shortness of Breath. 8.5 g 0    predniSONE (DELTASONE) 10 MG Tab Take 2 Tablets by mouth 2 times a day for 5 days. Take with food. 20 Tablet 0    TURMERIC PO Take 500 mg by mouth.      Cinnamon 500 MG Cap Take 500 mg by mouth.      Ascorbic Acid (VITAMIN C) 100 MG Chew Tab Vitamin C      celecoxib (CELEBREX) 200 MG Cap Take 1 Capsule by mouth every day. 90 Capsule 1    pantoprazole (PROTONIX) 40 MG Tablet Delayed Response Take 1 Tablet by mouth every day. 90 Tablet 1    Respiratory Therapy Supplies (CARETOUCH 2 CPAP HOSE ) Misc 14 cm H20 with sleep via mask daily  HALLEY:99 for 999 days (Patient not taking: Reported on 2023)       No current [facility]-ordered facility-administered medications on file.       Past Medical History:   Diagnosis Date    Apnea, sleep     Asthma     Back pain     Back pain     Chest tightness     Chickenpox     Cough     Daytime sleepiness     Depression 2021    Diarrhea     Dizziness     Frequent headaches     CAITLYN (generalized anxiety disorder) 2021    GERD (gastroesophageal reflux disease)     Chinese measles     Hearing difficulty     Heartburn     Hyperlipidemia     Insomnia     Migraine     Morning headache     Mumps     Painful joint     Palpitations     Ringing in ears     Shortness of breath     Sinus arrhythmia 2023    Snoring     Sore throat, chronic     Tonsillitis     Weakness        Social History     Tobacco Use    Smoking status: Former     Current packs/day: 0.25     Average packs/day: 0.3 packs/day for 10.0 years (2.5 ttl pk-yrs)     Types: Cigars, Cigarettes    Smokeless tobacco: Current     Types: Chew     Last attempt to quit: 2016   Vaping Use    Vaping Use: Never used   Substance Use Topics    Alcohol use: Yes     Alcohol/week: 3.6 oz     Types: 6 Cans of beer per week     Comment: weekly    Drug use: No       Family Status   Relation Name Status    Mo  Alive    Fa      MGMo       "MGFa      PGMo      PGFa       Family History   Problem Relation Age of Onset    Heart Disease Mother         valve replacement    Heart Disease Father     Suicide Attempts Father     Diabetes Maternal Grandmother     Dementia Maternal Grandfather     Diabetes Paternal Grandmother     Rheumatologic Disease Paternal Grandmother     Heart Attack Paternal Grandfather        ROS:  Review of Systems   Constitutional: Positive for fatigue, malaise. Negative for weight loss.  HENT: Positive for congestion, ear pain, and sore throat. Negative for ear pain, nosebleeds, and neck pain.    Eyes: Negative for vision changes.   Cardiovascular: Negative for chest pain, palpitations, orthopnea and leg swelling.   Respiratory: Positive for cough and sputum production. Negative for shortness of breath and wheezing.   Gastrointestinal: Negative for abdominal pain, nausea, vomiting or diarrhea.   Skin: Negative for rash, diaphoresis.     Exam:  /78   Pulse 87   Temp 36.6 °C (97.8 °F) (Temporal)   Resp 16   Ht 1.778 m (5' 10\")   Wt (!) 128 kg (282 lb)   SpO2 95%   General: well-nourished, well-developed male in NAD  Head: normocephalic, atraumatic  Eyes: PERRLA, EOM within normal limits, no conjunctival injection, no scleral icterus, visual fields and acuity grossly intact.  Ears: normal shape and symmetry, no tenderness, no discharge. External canals are without any significant edema or erythema. Left tympanic membrane is without any inflammation, no effusion.  Right canal is approximately 85% occluded with cerumen impaction, tympanic membrane which is visual does appear to be erythematous and dull.  Gross auditory acuity is intact.  Nose: symmetrical without tenderness, mild discharge, erythema present bilateral nares.  Mouth/Throat: reasonable hygiene, no exudates or tonsillar enlargement. Mild erythema present.   Neck: no masses, range of motion within normal limits, no tracheal deviation.  Lymph: " mild cervical adenopathy. No supraclavicular adenopathy.   Neuro: alert and oriented. Cranial nerves 1-12 grossly intact.   Cardiovascular: regular rate and rhythm without murmurs, rubs, or gallops. No edema.   Pulmonary: no distress. Chest is symmetrical with respiration. No wheezes, crackles.  Rhonchi noted at bases.  Musculoskeletal: appropriate muscle tone, gait is stable.  Skin: warm, dry, intact, no clubbing, no cyanosis.   Psych: appropriate mood, affect, judgement.         Assessment/Plan:  1. Acute lower respiratory tract infection  amoxicillin-clavulanate (AUGMENTIN) 875-125 MG Tab      2. Acute right otitis media  amoxicillin-clavulanate (AUGMENTIN) 875-125 MG Tab      3. Wheezing  albuterol 108 (90 Base) MCG/ACT Aero Soln inhalation aerosol    predniSONE (DELTASONE) 10 MG Tab              Augmentin and prednisone as directed.  Albuterol as needed for wheezing.  Rest, increase fluids, hand and respiratory hygiene.   May take OTC medications as directed for symptom relief.   Supportive care, differential diagnoses, and indications for immediate follow-up discussed with patient.   Pathogenesis of diagnosis discussed including typical length and natural progression.  Instructed to return to clinic or nearest emergency department for any change in condition, further concerns, or worsening of symptoms.  Patient states understanding of the plan of care and discharge instructions.  Instructed to make an appointment with his primary care provider in the next 3-5 days if not significantly improving and for further care.         Please note that this dictation was created using voice recognition software. I have made every reasonable attempt to correct obvious errors, but I expect that there are errors of grammar and possibly content that I did not discover before finalizing the note.      LUIS ANTONIO Hill.

## 2023-12-14 ENCOUNTER — APPOINTMENT (OUTPATIENT)
Dept: MEDICAL GROUP | Facility: CLINIC | Age: 48
End: 2023-12-14
Payer: COMMERCIAL

## 2023-12-29 ENCOUNTER — HOSPITAL ENCOUNTER (OUTPATIENT)
Dept: RADIOLOGY | Facility: MEDICAL CENTER | Age: 48
End: 2023-12-29
Attending: PHYSICIAN ASSISTANT
Payer: COMMERCIAL

## 2023-12-29 DIAGNOSIS — G89.29 CHRONIC PAIN OF RIGHT KNEE: ICD-10-CM

## 2023-12-29 DIAGNOSIS — M25.561 CHRONIC PAIN OF RIGHT KNEE: ICD-10-CM

## 2023-12-29 PROCEDURE — 73721 MRI JNT OF LWR EXTRE W/O DYE: CPT | Mod: RT

## 2024-01-18 ENCOUNTER — OFFICE VISIT (OUTPATIENT)
Dept: MEDICAL GROUP | Facility: CLINIC | Age: 49
End: 2024-01-18
Payer: COMMERCIAL

## 2024-01-18 VITALS
BODY MASS INDEX: 41.83 KG/M2 | DIASTOLIC BLOOD PRESSURE: 78 MMHG | OXYGEN SATURATION: 94 % | SYSTOLIC BLOOD PRESSURE: 134 MMHG | WEIGHT: 282.41 LBS | HEIGHT: 69 IN | RESPIRATION RATE: 20 BRPM | TEMPERATURE: 97.8 F | HEART RATE: 83 BPM

## 2024-01-18 DIAGNOSIS — G89.29 CHRONIC PAIN OF RIGHT KNEE: ICD-10-CM

## 2024-01-18 DIAGNOSIS — M25.561 CHRONIC PAIN OF RIGHT KNEE: ICD-10-CM

## 2024-01-18 DIAGNOSIS — M17.11 TRICOMPARTMENT OSTEOARTHRITIS OF RIGHT KNEE: ICD-10-CM

## 2024-01-18 DIAGNOSIS — M71.21 POPLITEAL CYST, RIGHT: ICD-10-CM

## 2024-01-18 PROCEDURE — 3078F DIAST BP <80 MM HG: CPT | Performed by: PHYSICIAN ASSISTANT

## 2024-01-18 PROCEDURE — 3075F SYST BP GE 130 - 139MM HG: CPT | Performed by: PHYSICIAN ASSISTANT

## 2024-01-18 PROCEDURE — 99213 OFFICE O/P EST LOW 20 MIN: CPT | Performed by: PHYSICIAN ASSISTANT

## 2024-01-18 ASSESSMENT — PATIENT HEALTH QUESTIONNAIRE - PHQ9: CLINICAL INTERPRETATION OF PHQ2 SCORE: 0

## 2024-01-18 ASSESSMENT — FIBROSIS 4 INDEX: FIB4 SCORE: 0.62

## 2024-01-19 NOTE — PROGRESS NOTES
"cc:  mri    Subjective:     Haakn Murrieta is a 48 y.o. male presenting for mri      Patient presents to the office for mri of the right knee.  He has been having issues for off and on for 10 years.  He is interested in further treatment.  He does indicate that in the past he has been told that he has had twisting of the patellar tendon.    Review of systems:  See above.   Denies any symptoms unless previously indicated.        Current Outpatient Medications:     albuterol 108 (90 Base) MCG/ACT Aero Soln inhalation aerosol, Inhale 2 Puffs every 6 hours as needed for Shortness of Breath., Disp: 8.5 g, Rfl: 0    TURMERIC PO, Take 500 mg by mouth., Disp: , Rfl:     Cinnamon 500 MG Cap, Take 500 mg by mouth., Disp: , Rfl:     Ascorbic Acid (VITAMIN C) 100 MG Chew Tab, Vitamin C, Disp: , Rfl:     celecoxib (CELEBREX) 200 MG Cap, Take 1 Capsule by mouth every day., Disp: 90 Capsule, Rfl: 1    pantoprazole (PROTONIX) 40 MG Tablet Delayed Response, Take 1 Tablet by mouth every day., Disp: 90 Tablet, Rfl: 1    Respiratory Therapy Supplies (CARETOUCH 2 CPAP HOSE ) Misc, 14 cm H20 with sleep via mask daily  HALLEY:99 for 999 days (Patient not taking: Reported on 11/9/2023), Disp: , Rfl:     Allergies, past medical history, past surgical history, family history, social history reviewed and updated    Objective:     Vitals: /78 (BP Location: Left arm, Patient Position: Sitting, BP Cuff Size: Adult)   Pulse 83   Temp 36.6 °C (97.8 °F) (Temporal)   Resp 20   Ht 1.753 m (5' 9\")   Wt (!) 128 kg (282 lb 6.6 oz)   SpO2 94%   BMI 41.70 kg/m²   General: Alert, pleasant, NAD  EYES:   PERRL, EOMI, no icterus or pallor.  Conjunctivae and lids normal.   HENT:  Normocephalic.  External ears normal.   Neck supple.     Respiratory: Normal respiratory effort.    Abdomen: obese  Skin: Warm, dry, no rashes.  Musculoskeletal: Gait is normal.  Moves all extremities well.    Neurological: No tremors, sensation grossly intact, CN2-12 " intact.  Psych:  Affect/mood is normal, judgement is good, memory is intact, grooming is appropriate.    12/29/2023 8:40 AM     HISTORY/REASON FOR EXAM:  Chronic intermittent anterior right knee pain.     TECHNIQUE/EXAM DESCRIPTION:  MRI of the RIGHT knee without contrast.     Using a Aleksandra 1.5 Noelle MRI scanner, T1 coronal, proton density fat-suppressed coronal, fast spin-echo T2 fat-suppressed axial, intermediate fast spin-echo sagittal, and intermediate fast spin-echo fat-suppressed sagittal images were obtained.     COMPARISON:     FINDINGS:     Menisci: Intact.     Tendons and Ligaments: The cruciate ligaments are intact. The collateral ligaments are intact.     Extensor Mechanism/Patella: There is mild thickening and increased signal of the patellar tendon at the origin consistent with patellar tendinosis. There is fissuring of the patellar cartilage medially and laterally. There is also fissuring of the   trochlear groove cartilage. There are small patellofemoral osteophytes.     Osseous Structures/Cartilaginous surfaces: There are small medial and lateral femorotibial osteophytes. There is mild fissuring of the medial femorotibial articular cartilage.     Miscellaneous: There is a small joint effusion. There is minimal popliteal cyst.     IMPRESSION:     1.  Mild tricompartment osteoarthritis.     2.  Intact ligaments and menisci.     3.  Mild tendinosis of the patellar tendon at the patellar origin.     4.  Small joint effusion.     5.  Minimal popliteal cyst.    Assessment/Plan:     Hakan was seen today for results.    Diagnoses and all orders for this visit:    Chronic pain of right knee  -     Referral to Physical Therapy  -     Referral to Orthopedics    Popliteal cyst, right  -     Referral to Physical Therapy  -     Referral to Orthopedics    Tricompartment osteoarthritis of right knee  -     Referral to Physical Therapy  -     Referral to Orthopedics        MRI results reviewed with patient.   Discussed test results.  At this time we will submit a referral to physical therapy as well as to orthopedics for further evaluation and treatment.    No follow-ups on file.    Please note that this dictation was created using voice recognition software. I have made every reasonable attempt to correct obvious errors, but expect that there are errors of grammar and possible content that I did not discover before finalizing note.

## 2024-01-23 DIAGNOSIS — K21.9 GASTROESOPHAGEAL REFLUX DISEASE WITHOUT ESOPHAGITIS: ICD-10-CM

## 2024-01-23 RX ORDER — PANTOPRAZOLE SODIUM 40 MG/1
40 TABLET, DELAYED RELEASE ORAL DAILY
Qty: 90 TABLET | Refills: 1 | Status: SHIPPED | OUTPATIENT
Start: 2024-01-23

## 2024-01-24 NOTE — TELEPHONE ENCOUNTER
Received request via: Patient    Was the patient seen in the last year in this department? Yes    Does the patient have an active prescription (recently filled or refills available) for medication(s) requested? No    Pharmacy Name: [facility] [place]    Does the patient have group home Plus and need 100 day supply (blood pressure, diabetes and cholesterol meds only)? Patient does not have SCP      Last office [id number]-7-19-24  Last labs- 5-30-23

## 2024-02-11 ENCOUNTER — OFFICE VISIT (OUTPATIENT)
Dept: URGENT CARE | Facility: PHYSICIAN GROUP | Age: 49
End: 2024-02-11
Payer: COMMERCIAL

## 2024-02-11 VITALS
HEIGHT: 70 IN | HEART RATE: 78 BPM | SYSTOLIC BLOOD PRESSURE: 114 MMHG | BODY MASS INDEX: 40.37 KG/M2 | WEIGHT: 282 LBS | OXYGEN SATURATION: 97 % | TEMPERATURE: 97.6 F | RESPIRATION RATE: 16 BRPM | DIASTOLIC BLOOD PRESSURE: 72 MMHG

## 2024-02-11 DIAGNOSIS — U07.1 COVID-19: ICD-10-CM

## 2024-02-11 PROCEDURE — 99214 OFFICE O/P EST MOD 30 MIN: CPT | Performed by: FAMILY MEDICINE

## 2024-02-11 PROCEDURE — 3074F SYST BP LT 130 MM HG: CPT | Performed by: FAMILY MEDICINE

## 2024-02-11 PROCEDURE — 3078F DIAST BP <80 MM HG: CPT | Performed by: FAMILY MEDICINE

## 2024-02-11 ASSESSMENT — FIBROSIS 4 INDEX: FIB4 SCORE: 0.62

## 2024-02-11 NOTE — PROGRESS NOTES
Chief Complaint   Patient presents with    Coronavirus Screening     Pt. Had 3 positive home covid tests.  He is having cough, congestion, body aches, fever and chills.          Cough  This is a new problem. The current episode started 3 d ago.    Home COVID test positive x3. The problem has been unchanged. The problem occurs constantly. The cough is dry. Associated symptoms include : fatigue, headaches, chills, muscle aches, fever. Pertinent negatives include no   nausea, vomiting, diarrhea, sweats, weight loss or wheezing. Nothing aggravates the symptoms.  Patient has tried nothing for the symptoms. There is no history of asthma.        Past Medical History:   Diagnosis Date    Apnea, sleep     Asthma     Back pain     Back pain     Chest tightness     Chickenpox     Cough     Daytime sleepiness     Depression 9/13/2021    Diarrhea     Dizziness     Frequent headaches     CAITLYN (generalized anxiety disorder) 9/13/2021    GERD (gastroesophageal reflux disease)     [place] measles     Hearing difficulty     Heartburn     Hyperlipidemia     Insomnia     Migraine     Morning headache     Mumps     Painful joint     Palpitations     Ringing in ears     Shortness of breath     Sinus arrhythmia 5/24/2023    Snoring     Sore throat, chronic     Tonsillitis     Weakness          Social History     Tobacco Use    Smoking status: Former     Current packs/day: 0.25     Average packs/day: 0.3 packs/day for 10.0 years (2.5 ttl pk-yrs)     Types: Cigars, Cigarettes    Smokeless tobacco: Current     Types: Chew     Last attempt to quit: 1/1/2016   Vaping Use    Vaping Use: Never used   Substance Use Topics    Alcohol use: Yes     Alcohol/week: 3.6 oz     Types: 6 Cans of beer per week     Comment: weekly    Drug use: No           Family History   Problem Relation Age of Onset    Heart Disease Mother         valve replacement    Heart Disease Father     Suicide Attempts Father     Diabetes Maternal Grandmother     Dementia Maternal  "Grandfather     Diabetes Paternal Grandmother     Rheumatologic Disease Paternal Grandmother     Heart Attack Paternal Grandfather                     Review of Systems   Constitutional: positive for fever and chills  HENT: negative for otalgia, sore throat  Cardiovascular - denies chest pain or dyspnea  Respiratory: Positive for cough.  .  Negative for wheezing.    Neurological: Negative for headaches, dizziness   GI - denies nausea, vomiting or diarrhea   - denies dysuria, discharge  Psych - denies depression, anxiety  Neuro - denies numbness or tingling.   10 point ROS otherwise negative, except per HPI             Objective:     /72   Pulse 78   Temp 36.4 °C (97.6 °F) (Temporal)   Resp 16   Ht 1.778 m (5' 10\")   Wt (!) 128 kg (282 lb)   SpO2 97%       Physical Exam   Constitutional: patient is oriented to person, place, and time. Patient appears well-developed and well-nourished. No distress.   HENT:   Head: Normocephalic and atraumatic.   Right Ear: External ear normal.   Left Ear: External ear normal.   TMs normal  Nose: Mucosal edema  present. Right sinus exhibits no maxillary sinus tenderness. Left sinus exhibits no maxillary sinus tenderness.   Mouth/Throat: Mucous membranes are normal. No oral lesions.  No posterior pharyngeal erythema.  No oropharyngeal exudate or posterior oropharyngeal edema.   Eyes: Conjunctivae and EOM are normal. Pupils are equal, round, and reactive to light. Right eye exhibits no discharge. Left eye exhibits no discharge. No scleral icterus.   Neck: Normal range of motion. Neck supple. No tracheal deviation present.   Cardiovascular: Normal rate, regular rhythm and normal heart sounds.  Exam reveals no friction rub.    Pulmonary/Chest: Effort normal. No respiratory distress. Patient has no wheezes or rhonchi. Patient has no rales.    Musculoskeletal:  exhibits no edema.   Lymphadenopathy:     Patient has no cervical adenopathy.      Neurological: patient is alert and " oriented to person, place, and time.   Skin: Skin is warm and dry. No rash noted. No erythema.   Psychiatric: patient  has a normal mood and affect.  behavior is normal.   Nursing note and vitals reviewed.          Assesment/Plan:    Positive for COVID-19 (home test)    1. COVID-19       1. COVID-19    Pt is high risk for complication due to COVID-19 based on:    BMI >30  UNVACCINATED STATUS      - Nirmatrelvir&Ritonavir 300/100 20 x 150 MG & 10 x 100MG Tablet Therapy Pack; Take 300 mg nirmatrelvir (two 150 mg tablets) with 100 mg ritonavir (one 100 mg tablet) by mouth, with all three tablets taken together twice daily for 5 days.  Dispense: 30 Each; Refill: 0      10 day home isolation per CDC guidelines      Differential diagnosis, natural history, supportive care, and indications for immediate follow-up discussed. All questions answered. Patient agrees with the plan of care.     Follow-up as needed if symptoms worsen or fail to improve to PCP, Urgent care or Emergency Room.     I have personally reviewed prior external notes and test results pertinent to today's visit.  I have independently reviewed and interpreted all diagnostics ordered during this urgent care acute visit.

## 2024-02-11 NOTE — LETTER
February 11, 2024         Patient: Hakan Murrieta   YOB: 1975   Date of Visit: 2/11/2024           To Whom it May Concern:    Hakan Murrieta was seen in my clinic on 2/11/2024. He may return to work on 2/18.    If you have any questions or concerns, please don't hesitate to call.        Sincerely,           Vijay Henriquez M.D.  Electronically Signed

## 2024-05-03 ENCOUNTER — HOSPITAL ENCOUNTER (OUTPATIENT)
Dept: RADIOLOGY | Facility: MEDICAL CENTER | Age: 49
End: 2024-05-03
Payer: COMMERCIAL

## 2024-05-08 ENCOUNTER — OFFICE VISIT (OUTPATIENT)
Dept: UROLOGY | Facility: MEDICAL CENTER | Age: 49
End: 2024-05-08
Payer: COMMERCIAL

## 2024-05-08 VITALS
HEART RATE: 84 BPM | WEIGHT: 281.8 LBS | SYSTOLIC BLOOD PRESSURE: 164 MMHG | TEMPERATURE: 97.4 F | BODY MASS INDEX: 40.34 KG/M2 | DIASTOLIC BLOOD PRESSURE: 91 MMHG | OXYGEN SATURATION: 97 % | HEIGHT: 70 IN

## 2024-05-08 DIAGNOSIS — N20.0 KIDNEY STONES: ICD-10-CM

## 2024-05-08 LAB
APPEARANCE UR: CLEAR
BILIRUB UR STRIP-MCNC: NORMAL MG/DL
COLOR UR AUTO: YELLOW
GLUCOSE UR STRIP.AUTO-MCNC: NORMAL MG/DL
KETONES UR STRIP.AUTO-MCNC: NORMAL MG/DL
LEUKOCYTE ESTERASE UR QL STRIP.AUTO: NORMAL
NITRITE UR QL STRIP.AUTO: NORMAL
PH UR STRIP.AUTO: 5.5 [PH] (ref 5–8)
PROT UR QL STRIP: NORMAL MG/DL
RBC UR QL AUTO: NORMAL
SP GR UR STRIP.AUTO: 1.02
UROBILINOGEN UR STRIP-MCNC: 0.2 MG/DL

## 2024-05-08 PROCEDURE — 81002 URINALYSIS NONAUTO W/O SCOPE: CPT | Performed by: STUDENT IN AN ORGANIZED HEALTH CARE EDUCATION/TRAINING PROGRAM

## 2024-05-08 PROCEDURE — 99203 OFFICE O/P NEW LOW 30 MIN: CPT | Mod: 25 | Performed by: STUDENT IN AN ORGANIZED HEALTH CARE EDUCATION/TRAINING PROGRAM

## 2024-05-08 PROCEDURE — 3077F SYST BP >= 140 MM HG: CPT | Performed by: STUDENT IN AN ORGANIZED HEALTH CARE EDUCATION/TRAINING PROGRAM

## 2024-05-08 PROCEDURE — 3080F DIAST BP >= 90 MM HG: CPT | Performed by: STUDENT IN AN ORGANIZED HEALTH CARE EDUCATION/TRAINING PROGRAM

## 2024-05-08 ASSESSMENT — FIBROSIS 4 INDEX: FIB4 SCORE: 0.62

## 2024-05-08 NOTE — PROGRESS NOTES
Subjective  Hakan Murrieta is a 48 y.o. male who presents today for evaluation of a left ureteral stone.    He was recently in the ED at [facility] on 5/3/2024 with left flank pain and was found to have a distal ureteral stone with possible forniceal rupture. He was having intermittent flank pain with some nausea and vomiting. The pain has since improved and he is no longer requiring Ibuprofen or narcotic pain medication.    He currently denies abdominal pain, flank pain, n/v/f/c. He has some pain over the bladder and an odor to the urine. No dysuria or hematuria.      Family History   Problem Relation Age of Onset    Heart Disease Mother         valve replacement    Heart Disease Father     Suicide Attempts Father     Diabetes Maternal Grandmother     Dementia Maternal Grandfather     Diabetes Paternal Grandmother     Rheumatologic Disease Paternal Grandmother     Heart Attack Paternal Grandfather        Social History     Socioeconomic History    Marital status:      Spouse name: Not on file    Number of children: Not on file    Years of education: Not on file    Highest education level: 12th grade   Occupational History    Not on file   Tobacco Use    Smoking status: Former     Current packs/day: 0.25     Average packs/day: 0.3 packs/day for 10.0 years (2.5 ttl pk-yrs)     Types: Cigars, Cigarettes    Smokeless tobacco: Current     Types: Chew     Last attempt to quit: 1/1/2016   Vaping Use    Vaping Use: Never used   Substance and Sexual Activity    Alcohol use: Yes     Alcohol/week: 3.6 oz     Types: 6 Cans of beer per week     Comment: weekly    Drug use: No    Sexual activity: Yes     Partners: Female     Birth control/protection: Male Sterilization   Other Topics Concern    Not on file   Social History Narrative    Not on file     Social Determinants of Health     Financial Resource Strain: Medium Risk (3/20/2023)    Overall Financial Resource Strain (CARDIA)     Difficulty of Paying Living  Expenses: Somewhat hard   Food Insecurity: No Food Insecurity (3/20/2023)    Hunger Vital Sign     Worried About Running Out of Food in the Last Year: Never true     Ran Out of Food in the Last Year: Never true   Transportation Needs: No Transportation Needs (3/20/2023)    PRAPARE - Transportation     Lack of Transportation (Medical): No     Lack of Transportation (Non-Medical): No   Physical Activity: Insufficiently Active (3/20/2023)    Exercise Vital Sign     Days of Exercise per Week: 3 days     Minutes of Exercise per Session: 20 min   Stress: Stress Concern Present (3/20/2023)    [employer] [place] [employer] - Occupational Stress Questionnaire     Feeling of Stress : To some extent   Social Connections: Unknown (3/20/2023)    Social Connection and Isolation Panel [NHANES]     Frequency of Communication with Friends and Family: Patient declined     Frequency of Social Gatherings with Friends and Family: Once a week     Attends Mandaeism Services: Never     Active Member of Clubs or Organizations: No     Attends Club or Organization Meetings: Patient declined     Marital Status:    Intimate Partner Violence: Not on file   Housing Stability: Unknown (3/20/2023)    Housing Stability Vital Sign     Unable to Pay for Housing in the Last Year: No     Number of Places Lived in the Last Year: Not on file     Unstable Housing in the Last Year: No       Past Surgical History:   Procedure Laterality Date    CHOLECYSTECTOMY      INGUINAL HERNIA REPAIR CHILD Right     TONSILLECTOMY AND ADENOIDECTOMY      VASECTOMY         Past Medical History:   Diagnosis Date    Apnea, sleep     Asthma     Back pain     Back pain     Chest tightness     Chickenpox     Cough     Daytime sleepiness     Depression 9/13/2021    Diarrhea     Dizziness     Frequent headaches     CAITLYN (generalized anxiety disorder) 9/13/2021    GERD (gastroesophageal reflux disease)     Sinhala measles     Hearing difficulty     Heartburn      "Hyperlipidemia     Insomnia     Migraine     Morning headache     Mumps     Painful joint     Palpitations     Ringing in ears     Shortness of breath     Sinus arrhythmia 5/24/2023    Snoring     Sore throat, chronic     Tonsillitis     Weakness        Current Outpatient Medications   Medication Sig Dispense Refill    Nirmatrelvir&Ritonavir 300/100 20 x 150 MG & 10 x 100MG Tablet Therapy Pack Take 300 mg nirmatrelvir (two 150 mg tablets) with 100 mg ritonavir (one 100 mg tablet) by mouth, with all three tablets taken together twice daily for 5 days. 30 Each 0    pantoprazole (PROTONIX) 40 MG Tablet Delayed Response Take 1 Tablet by mouth every day. 90 Tablet 1    albuterol 108 (90 Base) MCG/ACT Aero Soln inhalation aerosol Inhale 2 Puffs every 6 hours as needed for Shortness of Breath. 8.5 g 0    TURMERIC PO Take 500 mg by mouth.      Respiratory Therapy Supplies (CARETOUCH 2 CPAP HOSE ) Misc       celecoxib (CELEBREX) 200 MG Cap Take 1 Capsule by mouth every day. 90 Capsule 1    Cinnamon 500 MG Cap Take 500 mg by mouth. (Patient not taking: Reported on 2/11/2024)      Ascorbic Acid (VITAMIN C) 100 MG Chew Tab Vitamin C (Patient not taking: Reported on 2/11/2024)       No current facility-administered medications for this visit.       No Known Allergies    Objective  BP (!) 164/91 (BP Location: Right arm, Patient Position: Sitting, BP Cuff Size: Large adult)   Pulse 84   Temp 36.3 °C (97.4 °F) (Temporal)   Ht 1.778 m (5' 10\")   Wt (!) 128 kg (281 lb 12.8 oz)   SpO2 97%   Physical Exam  Constitutional:       Appearance: Normal appearance.   HENT:      Head: Normocephalic and atraumatic.   Pulmonary:      Effort: Pulmonary effort is normal.   Abdominal:      General: Abdomen is flat. There is no distension.      Palpations: Abdomen is soft.      Tenderness: There is no abdominal tenderness. There is no right CVA tenderness or left CVA tenderness.   Skin:     General: Skin is warm and dry.   Neurological: "      General: No focal deficit present.      Mental Status: He is alert.   Psychiatric:         Mood and Affect: Mood normal.         Behavior: Behavior normal.         Labs:   POCT UA   Lab Results   Component Value Date/Time    POCCOLOR yellow 05/08/2024 10:23 AM    POCAPPEAR clear 05/08/2024 10:23 AM    POCLEUKEST neg 05/08/2024 10:23 AM    POCNITRITE neg 05/08/2024 10:23 AM    POCUROBILIGE 0.2 05/08/2024 10:23 AM    POCPROTEIN neg 05/08/2024 10:23 AM    POCURPH 5.5 05/08/2024 10:23 AM    POCBLOOD neg 05/08/2024 10:23 AM    POCSPGRV 1.020 05/08/2024 10:23 AM    POCKETONES neg 05/08/2024 10:23 AM    POCBILIRUBIN neg 05/08/2024 10:23 AM    POCGLUCUA neg 05/08/2024 10:23 AM      BMP   Lab Results   Component Value Date/Time    SODIUM 143 05/27/2023 0839    POTASSIUM 4.4 05/27/2023 0839    CHLORIDE 109 05/27/2023 0839    CO2 25 05/27/2023 0839    GLUCOSE 113 (H) 05/27/2023 0839    BUN 16 05/27/2023 0839    CREATININE 0.83 05/27/2023 0839    CALCIUM 9.2 05/27/2023 0839     Imaging:     CT 5/3/2024:  3.1 x 1.9 mm left distal ureteral stone with perinephric stranding consistent with a forniceal rupture    Assessment    The CT abdomen/pelvis without contrast (renal colic) images from 5/3/2024 were personally reviewed and discussed with the patient. The patient had a 3 mm kidney stone in the left ureter with hydronephrosis and perinephric stranding suggesting a possible forniceal rupture. His symptoms have all resolved at this time and he believes he passed the stone. I recommend a Renal US to evaluate for residual hydronephrosis. If negative he may follow up in one year. If he develops fever, chills, worsening pain, etc he was instructed to call our office.     Kidney Stone Counseling:     We discussed that there is a 50% chance that they will develop another stone in the next 2 years. I recommend they drink plenty of fluids, we discussed the total amount required varies per patient. The goal with hydration should be to  have very pale/clear urine at all times. I recommend they limit red meat and salt intake. They should not decrease their calcium intake as this can increase their risk of developing stones in the future.   For motivated patients with their first stone, or a patient that has had several kidney stones that have passed or required intervention in the last year I recommend we proceed with a 24 hour urine study to determine if they could benefit from a medication to prevent future stone formation or if specific lifestyle modifications may be beneficial. It is suggested to complete this collection on a weekend or day off when there will be no barriers to collecting all urine samples from that 24 hour period. The patient does not wish to proceed.        Plan    Problem List Items Addressed This Visit    None  Visit Diagnoses       Kidney stones        Relevant Orders    US-RENAL    POCT Urinalysis (Completed)          RTC one year  Renal US in one week, will call with results

## 2024-05-15 ENCOUNTER — HOSPITAL ENCOUNTER (OUTPATIENT)
Dept: RADIOLOGY | Facility: MEDICAL CENTER | Age: 49
End: 2024-05-15
Attending: STUDENT IN AN ORGANIZED HEALTH CARE EDUCATION/TRAINING PROGRAM
Payer: COMMERCIAL

## 2024-05-15 DIAGNOSIS — N20.0 KIDNEY STONES: ICD-10-CM

## 2024-09-12 ENCOUNTER — OFFICE VISIT (OUTPATIENT)
Dept: URGENT CARE | Facility: PHYSICIAN GROUP | Age: 49
End: 2024-09-12
Payer: COMMERCIAL

## 2024-09-12 VITALS
OXYGEN SATURATION: 96 % | TEMPERATURE: 98.1 F | BODY MASS INDEX: 39.65 KG/M2 | DIASTOLIC BLOOD PRESSURE: 104 MMHG | WEIGHT: 277 LBS | HEIGHT: 70 IN | HEART RATE: 102 BPM | RESPIRATION RATE: 16 BRPM | SYSTOLIC BLOOD PRESSURE: 142 MMHG

## 2024-09-12 DIAGNOSIS — R03.0 ELEVATED BLOOD PRESSURE READING: ICD-10-CM

## 2024-09-12 DIAGNOSIS — B96.89 BACTERIAL SINUSITIS: ICD-10-CM

## 2024-09-12 DIAGNOSIS — Z72.0 TOBACCO USER: ICD-10-CM

## 2024-09-12 DIAGNOSIS — J32.9 BACTERIAL SINUSITIS: ICD-10-CM

## 2024-09-12 DIAGNOSIS — R00.0 TACHYCARDIA: ICD-10-CM

## 2024-09-12 PROCEDURE — 99214 OFFICE O/P EST MOD 30 MIN: CPT | Mod: 25 | Performed by: FAMILY MEDICINE

## 2024-09-12 PROCEDURE — 3080F DIAST BP >= 90 MM HG: CPT | Performed by: FAMILY MEDICINE

## 2024-09-12 PROCEDURE — 3077F SYST BP >= 140 MM HG: CPT | Performed by: FAMILY MEDICINE

## 2024-09-12 PROCEDURE — 99406 BEHAV CHNG SMOKING 3-10 MIN: CPT | Performed by: FAMILY MEDICINE

## 2024-09-12 ASSESSMENT — FIBROSIS 4 INDEX: FIB4 SCORE: 0.62

## 2024-09-12 NOTE — PROGRESS NOTES
"  Subjective:      48 y.o. male presents to urgent care for cold symptoms that started about one week ago. He is experiencing headache, increased sinus pressure, ear pain, sore throat, and cough. No fevers or diarrhea. He chews tobacco products daily. No history of asthma or COPD.  He is not vaccinated against COVID.  No known sick contacts.    Heart rate and blood pressure are both elevated today in urgent care.  He does not have a history of hypertension.  He denies any chest pain, palpitations, or shortness of breath.    He denies any other questions or concerns at this time.    Current problem list, medication, and past medical/surgical history were reviewed in Epic.    ROS  See HPI     Objective:      BP (!) 142/104   Pulse (!) 102   Temp 36.7 °C (98.1 °F) (Temporal)   Resp 16   Ht 1.778 m (5' 10\")   Wt (!) 126 kg (277 lb)   SpO2 96%   BMI 39.75 kg/m²     Physical Exam  Constitutional:       General: He is not in acute distress.     Appearance: He is not diaphoretic.   HENT:      Right Ear: Tympanic membrane, ear canal and external ear normal.      Left Ear: Tympanic membrane, ear canal and external ear normal.      Nose:      Right Sinus: Maxillary sinus tenderness and frontal sinus tenderness present.      Left Sinus: No maxillary sinus tenderness or frontal sinus tenderness.      Mouth/Throat:      Tongue: Tongue does not deviate from midline.      Palate: No lesions.      Pharynx: Uvula midline. No oropharyngeal exudate or posterior oropharyngeal erythema.      Tonsils: No tonsillar exudate. 0 on the right. 0 on the left.   Cardiovascular:      Rate and Rhythm: Regular rhythm. Tachycardia present.      Heart sounds: Normal heart sounds.   Pulmonary:      Effort: Pulmonary effort is normal. No respiratory distress.      Breath sounds: Normal breath sounds.   Neurological:      Mental Status: He is alert.   Psychiatric:         Mood and Affect: Affect normal.         Judgment: Judgment normal. "       Assessment/Plan:     1. Bacterial sinusitis  Criteria for bacterial sinusitis has been met.  Prescription for Augmentin has been sent.  Tylenol, and open, and Flonase as needed for symptomatic relief.  - amoxicillin-clavulanate (AUGMENTIN) 875-125 MG Tab; Take 1 Tablet by mouth 2 times a day for 5 days.  Dispense: 10 Tablet; Refill: 0    2. Elevated blood pressure reading  3. Tachycardia  Systemic symptoms seen through elevated blood pressure and heart rate.  Follow with PCP.    4. Tobacco user  4 minutes was spent in educating patient of health risks associated with tobacco, nicotine, and vaping. Verbalized understanding. He has been successful in quitting in the past.  The longest he has quit for was 1 year.  He is interested in quitting again, I have given him the resource 1 800 quit now.      Instructed to return to Urgent Care or nearest Emergency Department if symptoms fail to improve, for any change in condition, further concerns, or new concerning symptoms. Patient states understanding of the plan of care and discharge instructions.    Nida Winters M.D.

## 2024-09-12 NOTE — LETTER
September 12, 2024    To Whom It May Concern:         This is confirmation that Hakan Mckeon Murrieta attended his scheduled appointment with Nida Winters M.D. on 9/12/24. He may return to work tomorrow without any restrictions.         If you have any questions please do not hesitate to call me at the phone number listed below.    Sincerely,          Nida Winters M.D.  [phone]

## 2024-11-11 ENCOUNTER — OFFICE VISIT (OUTPATIENT)
Dept: URGENT CARE | Facility: PHYSICIAN GROUP | Age: 49
End: 2024-11-11
Payer: COMMERCIAL

## 2024-11-11 VITALS
RESPIRATION RATE: 19 BRPM | HEIGHT: 70 IN | DIASTOLIC BLOOD PRESSURE: 78 MMHG | SYSTOLIC BLOOD PRESSURE: 138 MMHG | TEMPERATURE: 97.4 F | HEART RATE: 80 BPM | OXYGEN SATURATION: 96 % | BODY MASS INDEX: 39.22 KG/M2 | WEIGHT: 274 LBS

## 2024-11-11 DIAGNOSIS — J06.9 VIRAL URI WITH COUGH: ICD-10-CM

## 2024-11-11 DIAGNOSIS — H61.23 BILATERAL IMPACTED CERUMEN: ICD-10-CM

## 2024-11-11 LAB
FLUAV RNA SPEC QL NAA+PROBE: NEGATIVE
FLUBV RNA SPEC QL NAA+PROBE: NEGATIVE
RSV RNA SPEC QL NAA+PROBE: NEGATIVE
S PYO DNA SPEC NAA+PROBE: NOT DETECTED
SARS-COV-2 RNA RESP QL NAA+PROBE: NEGATIVE

## 2024-11-11 PROCEDURE — 87651 STREP A DNA AMP PROBE: CPT | Performed by: FAMILY MEDICINE

## 2024-11-11 PROCEDURE — 99212 OFFICE O/P EST SF 10 MIN: CPT | Mod: 25 | Performed by: FAMILY MEDICINE

## 2024-11-11 PROCEDURE — 0241U POCT CEPHEID COV-2, FLU A/B, RSV - PCR: CPT | Performed by: FAMILY MEDICINE

## 2024-11-11 PROCEDURE — 69210 REMOVE IMPACTED EAR WAX UNI: CPT | Performed by: FAMILY MEDICINE

## 2024-11-11 ASSESSMENT — FIBROSIS 4 INDEX: FIB4 SCORE: 0.63

## 2024-11-11 NOTE — PROGRESS NOTES
Chief Complaint   Patient presents with    Body Aches    Fever    Chills    Pharyngitis    Otalgia     Right. Sx  7 days     Cough         Subjective:      Chief Complaint   Patient presents with    Body Aches    Fever    Chills    Pharyngitis    Otalgia     Right. Sx  7 days     Cough               Otalgia  This is a new problem. The current episode started in the past 7 days. The problem occurs constantly. The problem has been unchanged. Associated symptoms include bilat ear congestion and coughing. Pertinent negatives include no abdominal pain, chest pain, chills, fever, headaches, joint swelling, myalgias, nausea, neck pain, rash or visual change. Nothing aggravates the symptoms.  he has tried nothing for the symptoms.     Social History     Tobacco Use    Smoking status: Former     Current packs/day: 0.25     Average packs/day: 0.3 packs/day for 10.0 years (2.5 ttl pk-yrs)     Types: Cigars, Cigarettes    Smokeless tobacco: Current     Types: Chew     Last attempt to quit: 1/1/2016   Vaping Use    Vaping status: Never Used   Substance Use Topics    Alcohol use: Yes     Alcohol/week: 3.6 oz     Types: 6 Cans of beer per week     Comment: weekly    Drug use: No         Current Outpatient Medications on File Prior to Visit   Medication Sig Dispense Refill    pantoprazole (PROTONIX) 40 MG Tablet Delayed Response Take 1 Tablet by mouth every day. 90 Tablet 1    albuterol 108 (90 Base) MCG/ACT Aero Soln inhalation aerosol Inhale 2 Puffs every 6 hours as needed for Shortness of Breath. 8.5 g 0    TURMERIC PO Take 500 mg by mouth.      Respiratory Therapy Supplies (CARETOUCH 2 CPAP HOSE ) Misc       celecoxib (CELEBREX) 200 MG Cap Take 1 Capsule by mouth every day. 90 Capsule 1     No current facility-administered medications on file prior to visit.         Past Medical History:   Diagnosis Date    Sinus arrhythmia 5/24/2023    Depression 9/13/2021    CAITLYN (generalized anxiety disorder) 9/13/2021    Apnea, sleep   "   Asthma     Back pain     Back pain     Chest tightness     Chickenpox     Cough     Daytime sleepiness     Diarrhea     Dizziness     Frequent headaches     GERD (gastroesophageal reflux disease)     [place] measles     Hearing difficulty     Heartburn     Hyperlipidemia     Insomnia     Migraine     Morning headache     Mumps     Painful joint     Palpitations     Ringing in ears     Shortness of breath     Snoring     Sore throat, chronic     Tonsillitis     Weakness          Family History   Problem Relation Age of Onset    Heart Disease Mother         valve replacement    Heart Disease Father     Suicide Attempts Father     Diabetes Maternal Grandmother     Dementia Maternal Grandfather     Diabetes Paternal Grandmother     Rheumatologic Disease Paternal Grandmother     Heart Attack Paternal Grandfather           Review of Systems   Constitutional: +fever  HENT: Positive for congestion and ear pain. Negative for hearing loss and tinnitus.    Respiratory:   Negative for hemoptysis, shortness of breath and wheezing.    Cardiovascular: Negative for chest pain, palpitations and leg swelling.   Gastrointestinal: Negative for nausea and abdominal pain.   Musculoskeletal: Negative for myalgias, joint swelling and neck pain.   Skin: Negative for rash.   Neurological: Negative for headaches.   All other systems reviewed and are negative.         Objective:     /78   Pulse 80   Temp 36.3 °C (97.4 °F) (Temporal)   Resp 19   Ht 1.778 m (5' 10\")   Wt 124 kg (274 lb)   SpO2 96%     Physical Exam   Constitutional: Vital signs are normal.  No distress.   HENT:   Head: There is normal jaw occlusion.   Right Ear: External ear normal.    Left Ear: External ear normal.    Canals:   + cerumen impaction  Nose: Rhinorrhea and congestion present. No nasal discharge.   Mouth/Throat: Mucous membranes are moist. No oral lesions. Pharynx erythema present. No oropharyngeal exudate, pharynx swelling or pharynx petechiae. " Tonsils are absent  Eyes: Conjunctivae and EOM are normal. Pupils are equal, round, and reactive to light. Right eye exhibits no discharge. Left eye exhibits no discharge.   Neck: Normal range of motion. Neck supple.    No cervical LAD  Cardiovascular: Normal rate and regular rhythm.  Pulses are palpable.    No murmur heard.  Pulmonary/Chest: Effort normal and breath sounds normal. There is normal air entry. No respiratory distress. no wheezes, rhonchi,  retraction.   Musculoskeletal:   no edema.   Neurological: A/O x 3.   CN 2-12 intact   Skin: Skin is warm. Capillary refill takes less than 3 seconds. No purpura and no rash noted. Patient is not diaphoretic. No jaundice or pallor.   Nursing note and vitals reviewed.              Assessment/Plan:          1. Viral URI with cough   PCR negative for COVID, influenza A/B, RSV, strep throat.     rx motrin 800mg tid prn        2. Bilateral impacted cerumen   Ear canals were impacted with cerumen. Ear lavage was performed with normal saline, afterward impacted cerumen was removed with speculum. Subsequent to this, tympanic membranes were visualized and were normal.     Differential diagnosis, natural history, supportive care, and indications for immediate follow-up discussed. All questions answered. Patient agrees with the plan of care.     Follow-up as needed if symptoms worsen or fail to improve to PCP, Urgent care or Emergency Room.     I have personally reviewed prior external notes and test results pertinent to today's visit.  I have independently reviewed and interpreted all diagnostics ordered during this urgent care acute visit.

## 2025-04-09 ENCOUNTER — OFFICE VISIT (OUTPATIENT)
Dept: URGENT CARE | Facility: PHYSICIAN GROUP | Age: 50
End: 2025-04-09
Payer: COMMERCIAL

## 2025-04-09 VITALS
OXYGEN SATURATION: 97 % | RESPIRATION RATE: 20 BRPM | SYSTOLIC BLOOD PRESSURE: 138 MMHG | DIASTOLIC BLOOD PRESSURE: 72 MMHG | WEIGHT: 268.8 LBS | HEIGHT: 70 IN | BODY MASS INDEX: 38.48 KG/M2 | HEART RATE: 75 BPM | TEMPERATURE: 98.4 F

## 2025-04-09 DIAGNOSIS — J06.9 VIRAL URI: ICD-10-CM

## 2025-04-09 DIAGNOSIS — R11.2 NAUSEA AND VOMITING, UNSPECIFIED VOMITING TYPE: ICD-10-CM

## 2025-04-09 LAB
FLUAV RNA SPEC QL NAA+PROBE: NEGATIVE
FLUBV RNA SPEC QL NAA+PROBE: NEGATIVE
RSV RNA SPEC QL NAA+PROBE: NEGATIVE
SARS-COV-2 RNA RESP QL NAA+PROBE: NEGATIVE

## 2025-04-09 PROCEDURE — 3078F DIAST BP <80 MM HG: CPT | Performed by: STUDENT IN AN ORGANIZED HEALTH CARE EDUCATION/TRAINING PROGRAM

## 2025-04-09 PROCEDURE — 3075F SYST BP GE 130 - 139MM HG: CPT | Performed by: STUDENT IN AN ORGANIZED HEALTH CARE EDUCATION/TRAINING PROGRAM

## 2025-04-09 PROCEDURE — 0241U POCT CEPHEID COV-2, FLU A/B, RSV - PCR: CPT | Performed by: STUDENT IN AN ORGANIZED HEALTH CARE EDUCATION/TRAINING PROGRAM

## 2025-04-09 PROCEDURE — 99213 OFFICE O/P EST LOW 20 MIN: CPT | Performed by: STUDENT IN AN ORGANIZED HEALTH CARE EDUCATION/TRAINING PROGRAM

## 2025-04-09 RX ORDER — ONDANSETRON 4 MG/1
4 TABLET, ORALLY DISINTEGRATING ORAL EVERY 8 HOURS PRN
Qty: 10 TABLET | Refills: 0 | Status: SHIPPED | OUTPATIENT
Start: 2025-04-09

## 2025-04-09 ASSESSMENT — ENCOUNTER SYMPTOMS
VOMITING: 0
DIZZINESS: 0
DIARRHEA: 0
FEVER: 0
BLOOD IN STOOL: 0
CHILLS: 1
CONSTIPATION: 0
HEADACHES: 0
SHORTNESS OF BREATH: 0
SORE THROAT: 0
ABDOMINAL PAIN: 0
NAUSEA: 1
PALPITATIONS: 0
WHEEZING: 0
COUGH: 0

## 2025-04-09 ASSESSMENT — FIBROSIS 4 INDEX: FIB4 SCORE: 0.63

## 2025-04-09 NOTE — PROGRESS NOTES
"Subjective     Hakan Murrieta is a 49 y.o. male who presents with Nausea (X 1 day ), Nasal Congestion (X 1 day), and GI Problem (Lower stomach pain started 1 day ago )            Hakan is a 49 y.o. male who presents to urgent care with nausea, \"dry heaving,\" nasal congestion.  He also reports chills and body aches. Symptoms started yesterday. Reports \"soft playdough stools.\" He took OTC peopto which helped with Gi symptoms.         Review of Systems   Constitutional:  Positive for chills and malaise/fatigue. Negative for fever.   HENT:  Positive for congestion. Negative for ear pain and sore throat.    Respiratory:  Negative for cough, shortness of breath and wheezing.    Cardiovascular:  Negative for chest pain and palpitations.   Gastrointestinal:  Positive for nausea. Negative for abdominal pain, blood in stool, constipation, diarrhea, melena and vomiting.   Neurological:  Negative for dizziness and headaches.   All other systems reviewed and are negative.             Objective     /72   Pulse 75   Temp 36.9 °C (98.4 °F) (Temporal)   Resp 20   Ht 1.778 m (5' 10\")   Wt 122 kg (268 lb 12.8 oz)   SpO2 97%   BMI 38.57 kg/m²      Physical Exam  Vitals reviewed.   Constitutional:       Appearance: Normal appearance.   HENT:      Head: Normocephalic and atraumatic.      Right Ear: Tympanic membrane, ear canal and external ear normal.      Left Ear: Tympanic membrane, ear canal and external ear normal.      Nose: Congestion and rhinorrhea present.      Mouth/Throat:      Lips: Pink.      Mouth: Mucous membranes are moist.      Pharynx: Oropharynx is clear. Uvula midline.   Eyes:      Extraocular Movements: Extraocular movements intact.      Conjunctiva/sclera: Conjunctivae normal.      Pupils: Pupils are equal, round, and reactive to light.   Cardiovascular:      Rate and Rhythm: Normal rate.   Pulmonary:      Effort: Pulmonary effort is normal.      Breath sounds: Normal breath sounds.   Abdominal:      " General: Abdomen is flat. There is no distension.      Palpations: Abdomen is soft.      Tenderness: There is generalized abdominal tenderness. There is no guarding or rebound.   Skin:     General: Skin is warm and dry.   Neurological:      General: No focal deficit present.      Mental Status: He is alert and oriented to person, place, and time.                                  Assessment & Plan  Viral URI    Orders:    POCT CoV-2, Flu A/B, RSV by PCR    Nausea and vomiting, unspecified vomiting type  - Tolerated zofran in past without side effects or adverse reactions.  Requesting Rx for Zofran as that has helped in the past with nausea/vomiting.      Orders:    ondansetron (ZOFRAN ODT) 4 MG TABLET DISPERSIBLE; Take 1 Tablet by mouth every 8 hours as needed for Nausea/Vomiting.       Differential diagnoses, supportive care measures (rest, hydration, OTC Tylenol/ibuprofen, nasal saline rinses, humidified air, bland/brat diet) and indications for immediate follow-up discussed with patient. Pathogenesis of diagnosis discussed including typical length and natural progression.      Instructed to return to urgent care or nearest emergency department if symptoms fail to improve, for any change in condition, further concerns, or new concerning symptoms.    Patient states understanding and agrees with the plan of care and discharge instructions.